# Patient Record
Sex: FEMALE | Race: WHITE | NOT HISPANIC OR LATINO | Employment: OTHER | URBAN - NONMETROPOLITAN AREA
[De-identification: names, ages, dates, MRNs, and addresses within clinical notes are randomized per-mention and may not be internally consistent; named-entity substitution may affect disease eponyms.]

---

## 2017-01-12 ENCOUNTER — OFFICE VISIT (OUTPATIENT)
Dept: FAMILY MEDICINE CLINIC | Facility: CLINIC | Age: 77
End: 2017-01-12

## 2017-01-12 VITALS
WEIGHT: 186 LBS | BODY MASS INDEX: 29.19 KG/M2 | TEMPERATURE: 98.1 F | DIASTOLIC BLOOD PRESSURE: 60 MMHG | OXYGEN SATURATION: 95 % | HEART RATE: 73 BPM | HEIGHT: 67 IN | SYSTOLIC BLOOD PRESSURE: 150 MMHG

## 2017-01-12 DIAGNOSIS — J30.9 CHRONIC ALLERGIC RHINITIS: ICD-10-CM

## 2017-01-12 DIAGNOSIS — M85.80 OSTEOPENIA: ICD-10-CM

## 2017-01-12 DIAGNOSIS — I10 ESSENTIAL HYPERTENSION: ICD-10-CM

## 2017-01-12 DIAGNOSIS — J40 BRONCHITIS: Primary | ICD-10-CM

## 2017-01-12 PROCEDURE — 99214 OFFICE O/P EST MOD 30 MIN: CPT | Performed by: PHYSICIAN ASSISTANT

## 2017-01-12 RX ORDER — BENZONATATE 200 MG/1
200 CAPSULE ORAL 3 TIMES DAILY PRN
Qty: 90 CAPSULE | Refills: 0 | Status: SHIPPED | OUTPATIENT
Start: 2017-01-12 | End: 2017-03-27

## 2017-01-12 RX ORDER — ALBUTEROL SULFATE 90 UG/1
2 AEROSOL, METERED RESPIRATORY (INHALATION) EVERY 4 HOURS PRN
Qty: 1 INHALER | Refills: 5 | Status: SHIPPED | OUTPATIENT
Start: 2017-01-12 | End: 2017-03-27

## 2017-01-12 RX ORDER — AMOXICILLIN AND CLAVULANATE POTASSIUM 875; 125 MG/1; MG/1
1 TABLET, FILM COATED ORAL 2 TIMES DAILY
Qty: 20 TABLET | Refills: 0 | Status: SHIPPED | OUTPATIENT
Start: 2017-01-12 | End: 2017-03-27

## 2017-01-12 NOTE — MR AVS SNAPSHOT
Rylee Hull   1/12/2017 10:40 AM   Office Visit    Dept Phone:  [phone]   Encounter #:  [id number]    Provider:  BRANDAN Benitez   Department:  [facility]                Your Full Care Plan              Today's Medication Changes          These changes are accurate as of: 1/12/17 11:39 AM.  If you have any questions, ask your nurse or doctor.               New Medication(s)Ordered:     albuterol 108 (90 BASE) MCG/ACT inhaler   Commonly known as:  PROVENTIL HFA;VENTOLIN HFA   Inhale 2 puffs Every 4 (Four) Hours As Needed for shortness of air.   Started by:  BRANDAN Benitez       amoxicillin-clavulanate 875-125 MG per tablet   Commonly known as:  AUGMENTIN   Take 1 tablet by mouth 2 (Two) Times a Day.   Started by:  BRANDAN Benitez       benzonatate 200 MG capsule   Commonly known as:  TESSALON   Take 1 capsule by mouth 3 (Three) Times a Day As Needed for cough.   Started by:  BRANDAN Benitez            Where to Get Your Medications      These medications were sent to [facility] - [address] [phone]  - [phone]   [address] [facility] [address]     Phone:  [phone]     albuterol 108 (90 BASE) MCG/ACT inhaler    amoxicillin-clavulanate 875-125 MG per tablet    benzonatate 200 MG capsule                  Your Updated Medication List          This list is accurate as of: 1/12/17 11:39 AM.  Always use your most recent med list.                albuterol 108 (90 BASE) MCG/ACT inhaler   Commonly known as:  PROVENTIL HFA;VENTOLIN HFA   Inhale 2 puffs Every 4 (Four) Hours As Needed for shortness of air.       alendronate 70 MG tablet   Commonly known as:  FOSAMAX   Take 1 tablet by mouth every 7 days.       amoxicillin-clavulanate 875-125 MG per tablet   Commonly known as:  AUGMENTIN   Take 1 tablet by mouth 2 (Two) Times a Day.       benzonatate 200 MG capsule   Commonly known as:   "TESSALON   Take 1 capsule by mouth 3 (Three) Times a Day As Needed for cough.       bisoprolol 10 MG tablet   Commonly known as:  ZEBeta   Take 1 tablet by mouth every night.       cetirizine 10 MG tablet   Commonly known as:  zyrTEC   Take 1 tablet by mouth daily.       triamterene-hydrochlorothiazide 37.5-25 MG per tablet   Commonly known as:  MAXZIDE-25   Take 1 tablet by mouth every morning.               You Were Diagnosed With        Codes Comments    Bronchitis    -  Primary ICD-10-CM: J40  ICD-9-CM: 490     Essential hypertension     ICD-10-CM: I10  ICD-9-CM: 401.9     Chronic allergic rhinitis     ICD-10-CM: J30.9  ICD-9-CM: 477.9     Osteopenia     ICD-10-CM: M85.80  ICD-9-CM: 733.90       Instructions     None    Patient Instructions History      Upcoming Appointments     Visit Type Date Time Department    OFFICE VISIT 1/12/2017 10:40 AM [facility]    OFFICE VISIT 3/27/2017 10:00 AM [facility]      [employer] Signup     Our records indicate that you have declined [employer] signup. If you would like to sign up for [employer], please email [email or web address] or call [phone] to obtain an activation code.             Other Info from Your Visit           Your Appointments     Mar 27, 2017 10:00 AM EDT   Office Visit with Jorge Boss MD   [facility] (--)    [address] [place] [id number] [phone]           Please arrive 10 minutes early. Bring a complete list of all medications and bring any previous records or diagnostic testing results.              Allergies     Barbiturates  Rash      Vital Signs     Blood Pressure Pulse Temperature Height Weight Oxygen Saturation    150/60 (BP Location: Right arm, Patient Position: Sitting, Cuff Size: Adult) 73 98.1 °F (36.7 °C) (Oral) 66.75\" (169.5 cm) 186 lb (84.4 kg) 95%    Body Mass Index Smoking Status                29.35 kg/m2 Never Smoker          Problems and " Diagnoses Noted     Chronic allergic rhinitis    High blood pressure    Bone disease    Bronchitis    -  Primary

## 2017-01-12 NOTE — PROGRESS NOTES
Subjective   Rylee Hull is a 76 y.o. female.     History of Present Illness     Acute illness-  She is here today with complaints of cough productive of thick yellow sputum, sore throat, headache, wheezing and low-grade fever for the past 5 days.  Some relief with Robitussin.    Hypertension-not at goal.  May be elevated due to acute illness    Chronic allergic rhinitis-not at goal due to acute illness    Osteopenia-stable with Lasix, vitamin D and calcium    The following portions of the patient's history were reviewed and updated as appropriate: allergies, current medications, past family history, past medical history, past social history, past surgical history and problem list.    Review of Systems   Constitutional: Positive for chills, fatigue and fever. Negative for activity change and appetite change.   HENT: Positive for congestion and sore throat. Negative for ear pain and sinus pressure.    Eyes: Negative for pain and visual disturbance.   Respiratory: Positive for cough and wheezing. Negative for chest tightness.    Cardiovascular: Negative for chest pain and palpitations.   Gastrointestinal: Negative for abdominal pain, constipation, diarrhea, nausea and vomiting.   Endocrine: Negative for polydipsia and polyuria.   Genitourinary: Negative for dysuria and frequency.   Musculoskeletal: Negative for back pain and myalgias.   Skin: Negative for color change and rash.   Allergic/Immunologic: Negative for food allergies and immunocompromised state.   Neurological: Positive for headaches. Negative for dizziness and syncope.   Hematological: Negative for adenopathy. Does not bruise/bleed easily.   Psychiatric/Behavioral: Negative for hallucinations and suicidal ideas. The patient is not nervous/anxious.        Objective   Physical Exam   Constitutional: She is oriented to person, place, and time. She appears well-developed and well-nourished.   HENT:   Head: Normocephalic and atraumatic.   Nose: Nose  normal.   Mouth/Throat: Oropharynx is clear and moist.   Eyes: Conjunctivae and EOM are normal. Pupils are equal, round, and reactive to light.   Neck: Normal range of motion. Neck supple. No tracheal deviation present. No thyromegaly present.   Cardiovascular: Normal rate, regular rhythm, normal heart sounds and intact distal pulses.    No murmur heard.  Pulmonary/Chest: Effort normal. No respiratory distress. She has wheezes.   Coarse breath sounds noted bilaterally   Abdominal: Soft. Bowel sounds are normal. There is no tenderness. There is no guarding.   Musculoskeletal: Normal range of motion. She exhibits no edema or tenderness.   Lymphadenopathy:     She has no cervical adenopathy.   Neurological: She is alert and oriented to person, place, and time.   Skin: Skin is warm and dry. No rash noted.   Psychiatric: She has a normal mood and affect. Her behavior is normal.   Nursing note and vitals reviewed.      Assessment/Plan   Diagnoses and all orders for this visit:    Bronchitis  -     amoxicillin-clavulanate (AUGMENTIN) 875-125 MG per tablet; Take 1 tablet by mouth 2 (Two) Times a Day.  -     albuterol (PROVENTIL HFA;VENTOLIN HFA) 108 (90 BASE) MCG/ACT inhaler; Inhale 2 puffs Every 4 (Four) Hours As Needed for shortness of air.  -     benzonatate (TESSALON) 200 MG capsule; Take 1 capsule by mouth 3 (Three) Times a Day As Needed for cough.    Essential hypertension    Chronic allergic rhinitis    Osteopenia

## 2017-03-27 ENCOUNTER — OFFICE VISIT (OUTPATIENT)
Dept: FAMILY MEDICINE CLINIC | Facility: CLINIC | Age: 77
End: 2017-03-27

## 2017-03-27 VITALS
WEIGHT: 186 LBS | TEMPERATURE: 98.6 F | DIASTOLIC BLOOD PRESSURE: 80 MMHG | HEIGHT: 67 IN | SYSTOLIC BLOOD PRESSURE: 130 MMHG | OXYGEN SATURATION: 98 % | BODY MASS INDEX: 29.19 KG/M2 | HEART RATE: 68 BPM | RESPIRATION RATE: 12 BRPM

## 2017-03-27 DIAGNOSIS — Z00.00 HEALTHCARE MAINTENANCE: ICD-10-CM

## 2017-03-27 DIAGNOSIS — M81.0 AGE-RELATED OSTEOPOROSIS WITHOUT CURRENT PATHOLOGICAL FRACTURE: ICD-10-CM

## 2017-03-27 DIAGNOSIS — I10 ESSENTIAL HYPERTENSION: ICD-10-CM

## 2017-03-27 DIAGNOSIS — E78.2 MIXED HYPERLIPIDEMIA: Primary | ICD-10-CM

## 2017-03-27 DIAGNOSIS — M85.80 OSTEOPENIA: ICD-10-CM

## 2017-03-27 DIAGNOSIS — J30.9 CHRONIC ALLERGIC RHINITIS: ICD-10-CM

## 2017-03-27 DIAGNOSIS — E56.9 VITAMIN DEFICIENCY: ICD-10-CM

## 2017-03-27 PROCEDURE — 99213 OFFICE O/P EST LOW 20 MIN: CPT | Performed by: GENERAL PRACTICE

## 2017-03-27 RX ORDER — TRIAMTERENE AND HYDROCHLOROTHIAZIDE 37.5; 25 MG/1; MG/1
1 TABLET ORAL EVERY MORNING
Qty: 90 TABLET | Refills: 3 | Status: SHIPPED | OUTPATIENT
Start: 2017-03-27 | End: 2018-03-26 | Stop reason: SDUPTHER

## 2017-03-27 RX ORDER — FLUTICASONE PROPIONATE 50 MCG
SPRAY, SUSPENSION (ML) NASAL
Qty: 3 BOTTLE | Refills: 3 | Status: SHIPPED | OUTPATIENT
Start: 2017-03-27 | End: 2018-03-26 | Stop reason: SDUPTHER

## 2017-03-27 RX ORDER — ALENDRONATE SODIUM 70 MG/1
70 TABLET ORAL
Qty: 12 TABLET | Refills: 3 | Status: SHIPPED | OUTPATIENT
Start: 2017-03-27 | End: 2018-03-26 | Stop reason: SDUPTHER

## 2017-03-27 RX ORDER — CETIRIZINE HYDROCHLORIDE 10 MG/1
10 TABLET ORAL DAILY
Qty: 90 TABLET | Refills: 3 | Status: SHIPPED | OUTPATIENT
Start: 2017-03-27 | End: 2018-03-26 | Stop reason: SDUPTHER

## 2017-03-27 RX ORDER — BISOPROLOL FUMARATE 10 MG/1
10 TABLET, FILM COATED ORAL NIGHTLY
Qty: 90 TABLET | Refills: 3 | Status: SHIPPED | OUTPATIENT
Start: 2017-03-27 | End: 2018-03-26 | Stop reason: SDUPTHER

## 2017-03-27 NOTE — PROGRESS NOTES
Subjective   Rylee Hull is a 77 y.o. female.     History of Present Illness     Allergic Rhinitis  Patient has a history of allergic rhinitis. Patient's symptoms include sneezing, clear rhinorrhea and nasal congestion. These symptoms are perennial with seasonal exacerbation.  She has been worse since the onset of spring. Current triggers include exposure to pollens, weeds and dust. The patient has been suffering from these symptoms for a number of years . The patient has tried OTC antihistamine - cetirizine and OTC nasal corticosteroid - fluticasone with good relief of symptoms.  She is not been using the latter at present.  Immunotherapy has never been tried. The patient has no history of asthma. The patient has no history of eczema. The patient does not suffer from frequent sinopulmonary infections. The patient has not had sinus surgery in the past.     Hypertension  Home blood pressure readings: not doing. Associated signs and symptoms: none. Patient denies: chest pain, palpitations, dyspnea, orthopnea, paroxysmal nocturnal dyspnea and peripheral edema. Current antihypertensive medications includes bisoprolol and triamterene/hydrochlorthiazine. Medication compliance: taking as prescribed.     Dyslipidemia  Compliance with treatment has been good. The patient exercises frequently. She is currently being prescribed the following medication for her dyslipidemia - lifestyle modifcation.  She has had no recent labs    The following portions of the patient's history were reviewed and updated as appropriate: allergies, current medications, past medical history, past social history and problem list.    Review of Systems   Constitutional: Negative for appetite change, chills, fatigue, fever and unexpected weight change.   HENT: Positive for congestion, rhinorrhea and sneezing. Negative for ear pain, sore throat and voice change.    Eyes: Negative for visual disturbance.   Respiratory: Negative for cough, shortness  of breath and wheezing.    Cardiovascular: Negative for chest pain, palpitations and leg swelling.   Gastrointestinal: Negative for abdominal pain, blood in stool, constipation, diarrhea, nausea and vomiting.   Endocrine: Negative for polydipsia.   Genitourinary: Negative for difficulty urinating, dysuria, frequency, hematuria, menstrual problem, pelvic pain, urgency, vaginal bleeding and vaginal discharge.   Musculoskeletal: Negative for arthralgias, back pain, joint swelling, myalgias and neck pain.   Skin: Negative for color change.   Neurological: Negative for tremors, weakness, numbness and headaches.   Psychiatric/Behavioral: Negative for dysphoric mood, sleep disturbance and suicidal ideas. The patient is not nervous/anxious.      Objective   Physical Exam   Constitutional: She is oriented to person, place, and time. No distress.   Appeared younger then stated age. Bright and in good spirits. No apparent distress. No pallor, jaundice, diaphoresis, or cyanosis.     HENT:   Head: Atraumatic.   Right Ear: Tympanic membrane, external ear and ear canal normal.   Left Ear: Tympanic membrane, external ear and ear canal normal.   Nose: Nose normal.   Mouth/Throat: Oropharynx is clear and moist. Mucous membranes are not pale and not cyanotic.   Eyes: EOM are normal. Pupils are equal, round, and reactive to light. No scleral icterus.   Neck: No JVD present. Carotid bruit is not present. No tracheal deviation present. No thyromegaly present.   Cardiovascular: Normal rate, regular rhythm, S1 normal, S2 normal and intact distal pulses.  Exam reveals no gallop, no S3 and no S4.    No murmur heard.  Pulmonary/Chest: Breath sounds normal. No stridor. No respiratory distress.   Abdominal: Soft. Normal aorta and bowel sounds are normal. She exhibits no distension, no abdominal bruit and no mass. There is no hepatosplenomegaly. There is no tenderness. No hernia.   Musculoskeletal: She exhibits no tenderness or deformity.        Vascular Status -  Her exam exhibits no right foot edema. Her exam exhibits no left foot edema.  Lymphadenopathy:        Head (right side): No submandibular adenopathy present.        Head (left side): No submandibular adenopathy present.     She has no cervical adenopathy.   Neurological: She is alert and oriented to person, place, and time. She has normal reflexes. She displays normal reflexes. No cranial nerve deficit. She exhibits normal muscle tone. Coordination normal.   Skin: Skin is warm and dry. No rash noted. She is not diaphoretic. No cyanosis. No pallor. Nails show no clubbing.   Psychiatric: She has a normal mood and affect.     Assessment/Plan   Problems Addressed this Visit        Cardiovascular and Mediastinum    Mixed hyperlipidemia   Encouraged to continue to work on his diet and exercise plan.  Fasting lab work will be updated just prior to her return in 6 months     Relevant Orders    Lipid Panel    TSH    Essential hypertension  Hypertension: at goal. Evidence of target organ damage: none.  Continue current medication    Relevant Medications    triamterene-hydrochlorothiazide (MAXZIDE-25) 37.5-25 MG per tablet    bisoprolol (ZEBeta) 10 MG tablet    Other Relevant Orders    CBC & Differential    Comprehensive Metabolic Panel       Respiratory    Chronic allergic rhinitis  Reminded regarding allergen avoidance. We'll resume nasal corticosteroid    Relevant Medications    cetirizine (zyrTEC) 10 MG tablet    fluticasone (FLONASE) 50 MCG/ACT nasal spray       Digestive    Vitamin deficiency    Relevant Orders    Vitamin D 25 Hydroxy       Musculoskeletal and Integument    Osteopenia  Encouraged to pursue weightbearing activities while exercising joint protection     Relevant Medications    alendronate (FOSAMAX) 70 MG tablet       Other    Healthcare maintenance  Reminded to follow-up with her mammogram

## 2017-09-19 ENCOUNTER — LAB (OUTPATIENT)
Dept: FAMILY MEDICINE CLINIC | Facility: CLINIC | Age: 77
End: 2017-09-19

## 2017-09-19 DIAGNOSIS — I10 ESSENTIAL HYPERTENSION: ICD-10-CM

## 2017-09-19 DIAGNOSIS — M81.0 AGE-RELATED OSTEOPOROSIS WITHOUT CURRENT PATHOLOGICAL FRACTURE: ICD-10-CM

## 2017-09-19 DIAGNOSIS — E56.9 VITAMIN DEFICIENCY: ICD-10-CM

## 2017-09-19 DIAGNOSIS — E78.2 MIXED HYPERLIPIDEMIA: ICD-10-CM

## 2017-09-19 LAB
25(OH)D3 SERPL-MCNC: 38 NG/ML
ALBUMIN SERPL-MCNC: 4.5 G/DL (ref 3.4–4.8)
ALBUMIN/GLOB SERPL: 1.6 G/DL (ref 1.5–2.5)
ALP SERPL-CCNC: 88 U/L (ref 35–104)
ALT SERPL W P-5'-P-CCNC: 24 U/L (ref 10–36)
ANION GAP SERPL CALCULATED.3IONS-SCNC: 4.5 MMOL/L (ref 3.6–11.2)
AST SERPL-CCNC: 26 U/L (ref 10–30)
BASOPHILS # BLD AUTO: 0.03 10*3/MM3 (ref 0–0.3)
BASOPHILS NFR BLD AUTO: 0.5 % (ref 0–2)
BILIRUB SERPL-MCNC: 0.5 MG/DL (ref 0.2–1.8)
BUN BLD-MCNC: 16 MG/DL (ref 7–21)
BUN/CREAT SERPL: 21.6 (ref 7–25)
CALCIUM SPEC-SCNC: 9.7 MG/DL (ref 7.7–10)
CHLORIDE SERPL-SCNC: 106 MMOL/L (ref 99–112)
CHOLEST SERPL-MCNC: 215 MG/DL (ref 0–200)
CO2 SERPL-SCNC: 33.5 MMOL/L (ref 24.3–31.9)
CREAT BLD-MCNC: 0.74 MG/DL (ref 0.43–1.29)
DEPRECATED RDW RBC AUTO: 43.2 FL (ref 37–54)
EOSINOPHIL # BLD AUTO: 0.23 10*3/MM3 (ref 0–0.7)
EOSINOPHIL NFR BLD AUTO: 3.8 % (ref 0–7)
ERYTHROCYTE [DISTWIDTH] IN BLOOD BY AUTOMATED COUNT: 13.2 % (ref 11.5–14.5)
GFR SERPL CREATININE-BSD FRML MDRD: 76 ML/MIN/1.73
GLOBULIN UR ELPH-MCNC: 2.8 GM/DL
GLUCOSE BLD-MCNC: 93 MG/DL (ref 70–110)
HCT VFR BLD AUTO: 39.8 % (ref 37–47)
HDLC SERPL-MCNC: 82 MG/DL (ref 60–100)
HGB BLD-MCNC: 13.4 G/DL (ref 12–16)
IMM GRANULOCYTES # BLD: 0.01 10*3/MM3 (ref 0–0.03)
IMM GRANULOCYTES NFR BLD: 0.2 % (ref 0–0.5)
LDLC SERPL CALC-MCNC: 112 MG/DL (ref 0–100)
LDLC/HDLC SERPL: 1.37 {RATIO}
LYMPHOCYTES # BLD AUTO: 2.1 10*3/MM3 (ref 1–3)
LYMPHOCYTES NFR BLD AUTO: 34.9 % (ref 16–46)
MCH RBC QN AUTO: 30.1 PG (ref 27–33)
MCHC RBC AUTO-ENTMCNC: 33.7 G/DL (ref 33–37)
MCV RBC AUTO: 89.4 FL (ref 80–94)
MONOCYTES # BLD AUTO: 0.56 10*3/MM3 (ref 0.1–0.9)
MONOCYTES NFR BLD AUTO: 9.3 % (ref 0–12)
NEUTROPHILS # BLD AUTO: 3.09 10*3/MM3 (ref 1.4–6.5)
NEUTROPHILS NFR BLD AUTO: 51.3 % (ref 40–75)
OSMOLALITY SERPL CALC.SUM OF ELEC: 287.7 MOSM/KG (ref 273–305)
PLATELET # BLD AUTO: 296 10*3/MM3 (ref 130–400)
PMV BLD AUTO: 9.4 FL (ref 6–10)
POTASSIUM BLD-SCNC: 3.9 MMOL/L (ref 3.5–5.3)
PROT SERPL-MCNC: 7.3 G/DL (ref 6–8)
RBC # BLD AUTO: 4.45 10*6/MM3 (ref 4.2–5.4)
SODIUM BLD-SCNC: 144 MMOL/L (ref 135–153)
TRIGL SERPL-MCNC: 104 MG/DL (ref 0–150)
TSH SERPL DL<=0.05 MIU/L-ACNC: 1.05 MIU/ML (ref 0.55–4.78)
VLDLC SERPL-MCNC: 20.8 MG/DL
WBC NRBC COR # BLD: 6.02 10*3/MM3 (ref 4.5–12.5)

## 2017-09-19 PROCEDURE — 80061 LIPID PANEL: CPT | Performed by: GENERAL PRACTICE

## 2017-09-19 PROCEDURE — 36415 COLL VENOUS BLD VENIPUNCTURE: CPT

## 2017-09-19 PROCEDURE — 82306 VITAMIN D 25 HYDROXY: CPT | Performed by: GENERAL PRACTICE

## 2017-09-19 PROCEDURE — 80053 COMPREHEN METABOLIC PANEL: CPT | Performed by: GENERAL PRACTICE

## 2017-09-19 PROCEDURE — 84443 ASSAY THYROID STIM HORMONE: CPT | Performed by: GENERAL PRACTICE

## 2017-09-19 PROCEDURE — 85025 COMPLETE CBC W/AUTO DIFF WBC: CPT | Performed by: GENERAL PRACTICE

## 2017-09-26 ENCOUNTER — OFFICE VISIT (OUTPATIENT)
Dept: FAMILY MEDICINE CLINIC | Facility: CLINIC | Age: 77
End: 2017-09-26

## 2017-09-26 DIAGNOSIS — L98.9 SKIN LESION: ICD-10-CM

## 2017-09-26 DIAGNOSIS — Z23 ENCOUNTER FOR IMMUNIZATION: ICD-10-CM

## 2017-09-26 DIAGNOSIS — Z00.00 HEALTHCARE MAINTENANCE: ICD-10-CM

## 2017-09-26 DIAGNOSIS — M85.80 OSTEOPENIA: ICD-10-CM

## 2017-09-26 DIAGNOSIS — I10 ESSENTIAL HYPERTENSION: ICD-10-CM

## 2017-09-26 DIAGNOSIS — J30.9 CHRONIC ALLERGIC RHINITIS: ICD-10-CM

## 2017-09-26 DIAGNOSIS — E56.9 VITAMIN DEFICIENCY: ICD-10-CM

## 2017-09-26 DIAGNOSIS — E78.2 MIXED HYPERLIPIDEMIA: Primary | ICD-10-CM

## 2017-09-26 PROCEDURE — 99214 OFFICE O/P EST MOD 30 MIN: CPT | Performed by: GENERAL PRACTICE

## 2017-09-26 PROCEDURE — G0008 ADMIN INFLUENZA VIRUS VAC: HCPCS | Performed by: GENERAL PRACTICE

## 2017-09-26 PROCEDURE — 90686 IIV4 VACC NO PRSV 0.5 ML IM: CPT | Performed by: GENERAL PRACTICE

## 2017-09-26 NOTE — PROGRESS NOTES
Subjective   Rylee Hull is a 77 y.o. female.     History of Present Illness     Allergic Rhinitis  Patient has a history of allergic rhinitis. Patient's symptoms include sneezing, clear rhinorrhea and nasal congestion. These symptoms are perennial with seasonal exacerbation. Current triggers include exposure to pollens, weeds and dust. The patient has been suffering from these symptoms for a number of years . The patient has tried OTC antihistamine - cetirizine and OTC nasal corticosteroid - fluticasone with good relief of symptoms. Immunotherapy has never been tried. The patient has no history of asthma. The patient has no history of eczema. The patient does not suffer from frequent sinopulmonary infections. The patient has not had sinus surgery in the past.     Hypertension  Home blood pressure readings: not doing. Associated signs and symptoms: none. Patient denies: chest pain, palpitations, dyspnea, orthopnea, paroxysmal nocturnal dyspnea and peripheral edema. Current antihypertensive medications includes bisoprolol and triamterene/hydrochlorthiazine. Medication compliance: taking as prescribed. Most recent creatinine   Lab Results   Component Value Date    CREATININE 0.74 09/19/2017     Dyslipidemia  Compliance with treatment has been excellent. The patient exercises daily. She is currently being prescribed the following medication for her dyslipidemia - lifestyle modifcation. Most recent lipids include  Lab Results   Component Value Date    TRIG 104 09/19/2017    TRIG 105 09/07/2016    HDL 82 09/19/2017    HDL 88 09/07/2016    LDLCALC 112 (H) 09/19/2017    LDLCALC 111 (H) 09/07/2016     (H) 09/01/2015     (H) 09/18/2014     Labs  Most recent vitamin D 38    The following portions of the patient's history were reviewed and updated as appropriate: allergies, current medications, past family history, past medical history, past social history and problem list.    Review of Systems    Constitutional: Negative for appetite change, chills, fatigue, fever and unexpected weight change.   HENT: Positive for congestion, rhinorrhea and sneezing. Negative for ear pain, sore throat and voice change.    Eyes: Negative for visual disturbance.   Respiratory: Negative for cough, shortness of breath and wheezing.    Cardiovascular: Negative for chest pain, palpitations and leg swelling.   Gastrointestinal: Negative for abdominal pain, blood in stool, constipation, diarrhea, nausea and vomiting.   Endocrine: Negative for polydipsia.   Genitourinary: Negative for difficulty urinating, dysuria, frequency, hematuria, menstrual problem, pelvic pain, urgency, vaginal bleeding and vaginal discharge.   Musculoskeletal: Negative for arthralgias, back pain, joint swelling, myalgias and neck pain.   Skin: Negative for color change.   Neurological: Negative for tremors, weakness, numbness and headaches.   Psychiatric/Behavioral: Negative for dysphoric mood, sleep disturbance and suicidal ideas. The patient is not nervous/anxious.      Objective   Physical Exam   Constitutional: She is oriented to person, place, and time. No distress.   Appeared younger then stated age. Bright and in good spirits. No apparent distress. No pallor, jaundice, diaphoresis, or cyanosis.     HENT:   Head: Atraumatic.   Right Ear: Tympanic membrane, external ear and ear canal normal.   Left Ear: Tympanic membrane, external ear and ear canal normal.   Nose: Nose normal.   Mouth/Throat: Oropharynx is clear and moist. Mucous membranes are not pale and not cyanotic.   Eyes: EOM are normal. Pupils are equal, round, and reactive to light. No scleral icterus.   Neck: No JVD present. Carotid bruit is not present. No tracheal deviation present. No thyromegaly present.   Cardiovascular: Normal rate, regular rhythm, S1 normal, S2 normal and intact distal pulses.  Exam reveals no gallop, no S3 and no S4.    No murmur heard.  Pulmonary/Chest: Breath sounds  normal. No stridor. No respiratory distress.   Abdominal: Soft. Normal aorta and bowel sounds are normal. She exhibits no distension, no abdominal bruit and no mass. There is no hepatosplenomegaly. There is no tenderness. No hernia.   Musculoskeletal: She exhibits no tenderness or deformity.       Vascular Status -  Her exam exhibits no right foot edema. Her exam exhibits no left foot edema.  Lymphadenopathy:        Head (right side): No submandibular adenopathy present.        Head (left side): No submandibular adenopathy present.     She has no cervical adenopathy.   Neurological: She is alert and oriented to person, place, and time. She has normal reflexes. She displays normal reflexes. No cranial nerve deficit. She exhibits normal muscle tone. Coordination normal.   Skin: Skin is warm and dry. Lesion (1 cm slightly irregular well demarcated slightly scaly erythematous macule along the right medial jawline) noted. No rash noted. She is not diaphoretic. No cyanosis. No pallor. Nails show no clubbing.   Psychiatric: She has a normal mood and affect.     Assessment/Plan   Problems Addressed this Visit        Cardiovascular and Mediastinum    Mixed hyperlipidemia   Encouraged to continue to work on her diet and exercise plan.    Essential hypertension  Hypertension: at goal. Evidence of target organ damage: none.  Continue current medication       Respiratory    Chronic allergic rhinitis  Continue current treatment. Reminded regarding allergen avoidance.       Digestive    Vitamin deficiency  Corrected.    Continue low-dose supplementation  Will continue to monitor       Musculoskeletal and Integument    Osteopenia  We'll discuss an updated DEXA scan at her return    Skin lesion  Right medial jawline.  Reviewed options going forward.  Patient like to monitor and will report if any changes prior to her return       Other    Healthcare maintenance  Recommended a flu shot  Patient has already arrange for an updated  mammogram   We'll discuss an updated Tdap at her return     Encounter for immunization    Relevant Orders    Flu Vaccine Quad PF 3YR+ (Completed)

## 2017-09-27 VITALS
WEIGHT: 190 LBS | HEIGHT: 67 IN | SYSTOLIC BLOOD PRESSURE: 120 MMHG | RESPIRATION RATE: 12 BRPM | DIASTOLIC BLOOD PRESSURE: 70 MMHG | TEMPERATURE: 97.7 F | HEART RATE: 62 BPM | OXYGEN SATURATION: 97 % | BODY MASS INDEX: 29.82 KG/M2

## 2017-09-27 PROBLEM — L98.9 SKIN LESION: Status: ACTIVE | Noted: 2017-09-27

## 2017-12-15 ENCOUNTER — OFFICE VISIT (OUTPATIENT)
Dept: RETAIL CLINIC | Facility: CLINIC | Age: 77
End: 2017-12-15

## 2017-12-15 VITALS
OXYGEN SATURATION: 97 % | RESPIRATION RATE: 18 BRPM | WEIGHT: 188.6 LBS | TEMPERATURE: 97.8 F | BODY MASS INDEX: 29.76 KG/M2 | HEART RATE: 69 BPM

## 2017-12-15 DIAGNOSIS — R05.9 COUGH: Primary | ICD-10-CM

## 2017-12-15 DIAGNOSIS — J10.1 INFLUENZA A: ICD-10-CM

## 2017-12-15 LAB
EXPIRATION DATE: ABNORMAL
FLUAV AG NPH QL: POSITIVE
FLUBV AG NPH QL: NEGATIVE
INTERNAL CONTROL: ABNORMAL
Lab: ABNORMAL

## 2017-12-15 PROCEDURE — 99213 OFFICE O/P EST LOW 20 MIN: CPT | Performed by: NURSE PRACTITIONER

## 2017-12-15 PROCEDURE — 87804 INFLUENZA ASSAY W/OPTIC: CPT | Performed by: NURSE PRACTITIONER

## 2017-12-15 RX ORDER — OSELTAMIVIR PHOSPHATE 75 MG/1
75 CAPSULE ORAL 2 TIMES DAILY
Qty: 10 CAPSULE | Refills: 0 | Status: SHIPPED | OUTPATIENT
Start: 2017-12-15 | End: 2018-03-22

## 2017-12-15 RX ORDER — AZITHROMYCIN 250 MG/1
TABLET, FILM COATED ORAL
Qty: 6 TABLET | Refills: 0 | Status: SHIPPED | OUTPATIENT
Start: 2017-12-15 | End: 2018-03-22

## 2017-12-15 RX ORDER — GUAIFENESIN 600 MG/1
600 TABLET, EXTENDED RELEASE ORAL 2 TIMES DAILY
Qty: 14 TABLET | Refills: 0 | Status: SHIPPED | OUTPATIENT
Start: 2017-12-15 | End: 2018-03-22

## 2017-12-15 NOTE — PROGRESS NOTES
Subjective     Rylee Hull is a 77 y.o. female.     Chief Complaint   Patient presents with   • Cough      Cough   This is a new problem. Episode onset: 2 days. The problem has been gradually worsening. The cough is productive of brown sputum. Associated symptoms include chills, ear congestion, a fever, headaches, myalgias, nasal congestion, postnasal drip and rhinorrhea. Pertinent negatives include no chest pain, ear pain, rash, sore throat or wheezing. Nothing aggravates the symptoms. The treatment provided no relief.      The following portions of the patient's history were reviewed and updated as appropriate: allergies, current medications, past family history, past medical history, past social history, past surgical history and problem list.    Current Outpatient Prescriptions:   •  alendronate (FOSAMAX) 70 MG tablet, Take 1 tablet by mouth Every 7 (Seven) Days., Disp: 12 tablet, Rfl: 3  •  bisoprolol (ZEBeta) 10 MG tablet, Take 1 tablet by mouth Every Night., Disp: 90 tablet, Rfl: 3  •  cetirizine (zyrTEC) 10 MG tablet, Take 1 tablet by mouth Daily., Disp: 90 tablet, Rfl: 3  •  fluticasone (FLONASE) 50 MCG/ACT nasal spray, Administer 2 sprays both nostrils once daily, Disp: 3 bottle, Rfl: 3  •  triamterene-hydrochlorothiazide (MAXZIDE-25) 37.5-25 MG per tablet, Take 1 tablet by mouth Every Morning., Disp: 90 tablet, Rfl: 3  •  azithromycin (ZITHROMAX Z-CONSUELO) 250 MG tablet, Take 2 tablets the first day, then 1 tablet daily for 4 days., Disp: 6 tablet, Rfl: 0  •  guaiFENesin (MUCINEX) 600 MG 12 hr tablet, Take 1 tablet by mouth 2 (Two) Times a Day., Disp: 14 tablet, Rfl: 0  •  oseltamivir (TAMIFLU) 75 MG capsule, Take 1 capsule by mouth 2 (Two) Times a Day., Disp: 10 capsule, Rfl: 0    Pulse 69  Temp 97.8 °F (36.6 °C) (Oral)   Resp 18  Wt 85.5 kg (188 lb 9.6 oz)  SpO2 97%  BMI 29.76 kg/m2    Review of Systems   Constitutional: Positive for chills and fever.   HENT: Positive for postnasal drip and  rhinorrhea. Negative for ear pain and sore throat.    Respiratory: Positive for cough. Negative for wheezing.    Cardiovascular: Negative for chest pain.   Gastrointestinal: Negative for nausea and vomiting.   Musculoskeletal: Positive for myalgias. Negative for neck stiffness.   Skin: Negative for color change and rash.   Neurological: Positive for headaches.   Psychiatric/Behavioral: Positive for sleep disturbance.     Allergies   Allergen Reactions   • Barbiturates Rash     Physical Exam   Constitutional: She appears well-developed.  Non-toxic appearance. She has a sickly appearance.   HENT:   Head: Normocephalic.   Right Ear: Tympanic membrane normal.   Left Ear: Tympanic membrane normal.   Nose: Mucosal edema and rhinorrhea present.   Mouth/Throat: Uvula is midline. Posterior oropharyngeal erythema present. No oropharyngeal exudate. No tonsillar exudate.   Eyes: Conjunctivae are normal.   Cardiovascular: Normal rate and regular rhythm.    Pulmonary/Chest: Effort normal and breath sounds normal. No respiratory distress. She has no wheezes.   Lymphadenopathy:     She has cervical adenopathy.   Neurological: She is alert.   Skin: Skin is warm and dry.   Psychiatric: She has a normal mood and affect.      Assessment/Plan     Rylee was seen today for cough.    Diagnoses and all orders for this visit:    Cough  -     POC Influenza A / B  -     azithromycin (ZITHROMAX Z-CONSUELO) 250 MG tablet; Take 2 tablets the first day, then 1 tablet daily for 4 days.  -     guaiFENesin (MUCINEX) 600 MG 12 hr tablet; Take 1 tablet by mouth 2 (Two) Times a Day.    Influenza A  -     oseltamivir (TAMIFLU) 75 MG capsule; Take 1 capsule by mouth 2 (Two) Times a Day.      Results for orders placed or performed in visit on 12/15/17   POC Influenza A / B   Result Value Ref Range    Rapid Influenza A Ag POSITIVE     Rapid Influenza B Ag NEGATIVE     Internal Control Passed Passed    Lot Number [id number]     Expiration Date 4/2019          Discussed results to the POC flu screen. Reviewed measures to promote hydration and control fever and infection control.  Follow up with PCP or at the Urgent Care if symptoms worsen or fail to improve.  Patient teaching information discussed and provided to the patient. Patient verbalized understanding.      December 15, 2017 11:49 AM

## 2017-12-15 NOTE — PATIENT INSTRUCTIONS
"Influenza, Adult  Influenza (\"the flu\") is an infection in the lungs, nose, and throat (respiratory tract). It is caused by a virus. The flu causes many common cold symptoms, as well as a high fever and body aches. It can make you feel very sick.  The flu spreads easily from person to person (is contagious). Getting a flu shot (influenza vaccination) every year is the best way to prevent the flu.  HOME CARE  · Take over-the-counter and prescription medicines only as told by your doctor.  · Use a cool mist humidifier to add moisture (humidity) to the air in your home. This can make it easier to breathe.  · Rest as needed.  · Drink enough fluid to keep your pee (urine) clear or pale yellow.  · Cover your mouth and nose when you cough or sneeze.  · Wash your hands with soap and water often, especially after you cough or sneeze. If you cannot use soap and water, use hand .  · Stay home from work or school as told by your doctor. Unless you are visiting your doctor, try to avoid leaving home until your fever has been gone for 24 hours without the use of medicine.  · Keep all follow-up visits as told by your doctor. This is important.  PREVENTION  · Getting a yearly (annual) flu shot is the best way to avoid getting the flu. You may get the flu shot in late summer, fall, or winter. Ask your doctor when you should get your flu shot.  · Wash your hands often or use hand  often.  · Avoid contact with people who are sick during cold and flu season.  · Eat healthy foods.  · Drink plenty of fluids.  · Get enough sleep.  · Exercise regularly.  GET HELP IF:  · You get new symptoms.  · You have:    Chest pain.    Watery poop (diarrhea).    A fever.  · Your cough gets worse.  · You start to have more mucus.  · You feel sick to your stomach (nauseous).  · You throw up (vomit).  GET HELP RIGHT AWAY IF:  · You start to be short of breath or have trouble breathing.  · Your skin or nails turn a bluish color.  · You " have very bad pain or stiffness in your neck.  · You get a sudden headache.  · You get sudden pain in your face or ear.  · You cannot stop throwing up.     This information is not intended to replace advice given to you by your health care provider. Make sure you discuss any questions you have with your health care provider.     Document Released: 09/26/2009 Document Revised: 04/10/2017 Document Reviewed: 10/11/2016  [employer] Interactive Patient Education ©2017 [employer]    Cough, Adult  Coughing is a reflex that clears your throat and your airways. Coughing helps to heal and protect your lungs. It is normal to cough occasionally, but a cough that happens with other symptoms or lasts a long time may be a sign of a condition that needs treatment. A cough may last only 2-3 weeks (acute), or it may last longer than 8 weeks (chronic).  CAUSES  Coughing is commonly caused by:  · Breathing in substances that irritate your lungs.  · A viral or bacterial respiratory infection.  · Allergies.  · Asthma.  · Postnasal drip.  · Smoking.  · Acid backing up from the stomach into the esophagus (gastroesophageal reflux).  · Certain medicines.  · Chronic lung problems, including COPD (or rarely, lung cancer).  · Other medical conditions such as heart failure.  HOME CARE INSTRUCTIONS   Pay attention to any changes in your symptoms. Take these actions to help with your discomfort:  · Take medicines only as told by your health care provider.    If you were prescribed an antibiotic medicine, take it as told by your health care provider. Do not stop taking the antibiotic even if you start to feel better.    Talk with your health care provider before you take a cough suppressant medicine.  · Drink enough fluid to keep your urine clear or pale yellow.  · If the air is dry, use a cold steam vaporizer or humidifier in your bedroom or your home to help loosen secretions.  · Avoid anything that causes you to cough at work or at home.  · If  your cough is worse at night, try sleeping in a semi-upright position.  · Avoid cigarette smoke. If you smoke, quit smoking. If you need help quitting, ask your health care provider.  · Avoid caffeine.  · Avoid alcohol.  · Rest as needed.  SEEK MEDICAL CARE IF:   · You have new symptoms.  · You cough up pus.  · Your cough does not get better after 2-3 weeks, or your cough gets worse.  · You cannot control your cough with suppressant medicines and you are losing sleep.  · You develop pain that is getting worse or pain that is not controlled with pain medicines.  · You have a fever.  · You have unexplained weight loss.  · You have night sweats.  SEEK IMMEDIATE MEDICAL CARE IF:  · You cough up blood.  · You have difficulty breathing.  · Your heartbeat is very fast.     This information is not intended to replace advice given to you by your health care provider. Make sure you discuss any questions you have with your health care provider.     Document Released: 06/15/2012 Document Revised: 09/07/2016 Document Reviewed: 02/24/2016  [employer] Interactive Patient Education ©2017 [employer]

## 2018-03-22 ENCOUNTER — OFFICE VISIT (OUTPATIENT)
Dept: FAMILY MEDICINE CLINIC | Facility: CLINIC | Age: 78
End: 2018-03-22

## 2018-03-22 VITALS
DIASTOLIC BLOOD PRESSURE: 78 MMHG | SYSTOLIC BLOOD PRESSURE: 128 MMHG | WEIGHT: 191 LBS | HEIGHT: 67 IN | TEMPERATURE: 98 F | OXYGEN SATURATION: 99 % | BODY MASS INDEX: 29.98 KG/M2 | HEART RATE: 66 BPM

## 2018-03-22 DIAGNOSIS — I10 ESSENTIAL HYPERTENSION: ICD-10-CM

## 2018-03-22 DIAGNOSIS — J30.89 CHRONIC NONSEASONAL ALLERGIC RHINITIS DUE TO POLLEN: ICD-10-CM

## 2018-03-22 DIAGNOSIS — Z00.00 MEDICARE ANNUAL WELLNESS VISIT, SUBSEQUENT: Primary | ICD-10-CM

## 2018-03-22 DIAGNOSIS — M81.0 AGE-RELATED OSTEOPOROSIS WITHOUT CURRENT PATHOLOGICAL FRACTURE: ICD-10-CM

## 2018-03-22 DIAGNOSIS — Z13.820 ENCOUNTER FOR SCREENING FOR OSTEOPOROSIS: ICD-10-CM

## 2018-03-22 DIAGNOSIS — Z12.31 ENCOUNTER FOR SCREENING MAMMOGRAM FOR BREAST CANCER: ICD-10-CM

## 2018-03-22 DIAGNOSIS — Z00.00 HEALTHCARE MAINTENANCE: ICD-10-CM

## 2018-03-22 DIAGNOSIS — E78.2 MIXED HYPERLIPIDEMIA: ICD-10-CM

## 2018-03-22 PROCEDURE — G0009 ADMIN PNEUMOCOCCAL VACCINE: HCPCS | Performed by: PHYSICIAN ASSISTANT

## 2018-03-22 PROCEDURE — G0439 PPPS, SUBSEQ VISIT: HCPCS | Performed by: PHYSICIAN ASSISTANT

## 2018-03-22 PROCEDURE — 90732 PPSV23 VACC 2 YRS+ SUBQ/IM: CPT | Performed by: PHYSICIAN ASSISTANT

## 2018-03-22 NOTE — PROGRESS NOTES
QUICK REFERENCE INFORMATION:  The ABCs of the Annual Wellness Visit    Subsequent Medicare Wellness Visit    HEALTH RISK ASSESSMENT    1940    Recent Hospitalizations:  No hospitalization(s) within the last year..        Current Medical Providers:  Patient Care Team:  Jorge Boss MD as PCP - General (Family Medicine)        Smoking Status:  History   Smoking Status   • Never Smoker   Smokeless Tobacco   • Never Used       Alcohol Consumption:  History   Alcohol Use No       Depression Screen:   PHQ-2/PHQ-9 Depression Screening 3/22/2018   Little interest or pleasure in doing things 0   Feeling down, depressed, or hopeless 0   Trouble falling or staying asleep, or sleeping too much 0   Feeling tired or having little energy 0   Poor appetite or overeating 0   Feeling bad about yourself - or that you are a failure or have let yourself or your family down 0   Trouble concentrating on things, such as reading the newspaper or watching television 0   Moving or speaking so slowly that other people could have noticed. Or the opposite - being so fidgety or restless that you have been moving around a lot more than usual 0   Thoughts that you would be better off dead, or of hurting yourself in some way 0   Total Score 0   If you checked off any problems, how difficult have these problems made it for you to do your work, take care of things at home, or get along with other people? Not difficult at all       Health Habits and Functional and Cognitive Screening:  Functional & Cognitive Status 3/22/2018   Do you have difficulty preparing food and eating? No   Do you have difficulty bathing yourself, getting dressed or grooming yourself? No   Do you have difficulty using the toilet? No   Do you have difficulty moving around from place to place? No   Do you have trouble with steps or getting out of a bed or a chair? No   In the past year have you fallen or experienced a near fall? No   Current Diet Well Balanced Diet    Dental Exam Not up to date   Eye Exam Up to date   Exercise (times per week) 1 times per week   Current Exercise Activities Include Walking   Do you need help using the phone?  No   Are you deaf or do you have serious difficulty hearing?  No   Do you need help with transportation? No   Do you need help shopping? No   Do you need help preparing meals?  No   Do you need help with housework?  No   Do you need help with laundry? No   Do you need help taking your medications? No   Do you need help managing money? No   Do you ever drive or ride in a car without wearing a seat belt? No   Have you felt unusual stress, anger or loneliness in the last month? Yes   Who do you live with? Alone   If you need help, do you have trouble finding someone available to you? Yes   Have you been bothered in the last four weeks by sexual problems? No   Do you have difficulty concentrating, remembering or making decisions? No     Fall Risk Assessment  Fallen in past 6 months: 0--> No  Mental Status: 0--> no mental status change  Mobility: 0--> No mobility issues  Medications: 0--> No meds  Total Fall Risk Score: 2    Does the patient have evidence of cognitive impairment? No    Aspirin use counseling: Does not need ASA (and currently is not on it)      Recent Lab Results:  CMP:  Lab Results   Component Value Date    BUN 16 09/19/2017    CREATININE 0.74 09/19/2017    EGFRIFNONA 76 09/19/2017    EGFRIFAFRI  09/07/2016      Comment:      <15 Indicative of kidney failure.    BCR 21.6 09/19/2017     09/19/2017    K 3.9 09/19/2017    CO2 33.5 (H) 09/19/2017    CALCIUM 9.7 09/19/2017    ALBUMIN 4.50 09/19/2017    LABIL2 1.6 09/19/2017    BILITOT 0.5 09/19/2017    ALKPHOS 88 09/19/2017    AST 26 09/19/2017    ALT 24 09/19/2017     Lipid Panel:  Lab Results   Component Value Date    CHOL 215 (H) 09/19/2017    TRIG 104 09/19/2017    HDL 82 09/19/2017    VLDL 20.8 09/19/2017    LDLHDL 1.37 09/19/2017     HbA1c:       Visual Acuity:   Visual  Acuity Screening    Right eye Left eye Both eyes   Without correction:      With correction: 20/25 20/20 20/25     Hearing acuity:  Whisper test  > 5 ft left ear  > 5 ft right ear    Age-appropriate Screening Schedule:  Refer to the list below for future screening recommendations based on patient's age, sex and/or medical conditions. Orders for these recommended tests are listed in the plan section. The patient has been provided with a written plan.    Health Maintenance   Topic Date Due   • TDAP/TD VACCINES (1 - Tdap) 03/16/1959   • PNEUMOCOCCAL VACCINES (65+ LOW/MEDIUM RISK) (2 of 2 - PPSV23) 05/05/2016   • LIPID PANEL  09/19/2018   • MAMMOGRAM  03/22/2020   • DXA SCAN  03/22/2020   • PAP SMEAR  03/22/2023   • COLONOSCOPY  04/21/2024   • INFLUENZA VACCINE  Completed   • ZOSTER VACCINE  Completed        Subjective   History of Present Illness    Rylee Hull is a 78 y.o. female who presents for an Subsequent Wellness Visit.    Osteoporosis-stable with Fosamax, calcium and vitamin D supplementation    Hyperlipidemia-stable with diet    Hypertension-well controlled with bisoprolol and Maxzide    Chronic allergic rhinitis-stable Zyrtec    The following portions of the patient's history were reviewed and updated as appropriate: allergies, current medications, past family history, past medical history, past social history, past surgical history and problem list.    Outpatient Medications Prior to Visit   Medication Sig Dispense Refill   • alendronate (FOSAMAX) 70 MG tablet Take 1 tablet by mouth Every 7 (Seven) Days. 12 tablet 3   • bisoprolol (ZEBeta) 10 MG tablet Take 1 tablet by mouth Every Night. 90 tablet 3   • cetirizine (zyrTEC) 10 MG tablet Take 1 tablet by mouth Daily. 90 tablet 3   • fluticasone (FLONASE) 50 MCG/ACT nasal spray Administer 2 sprays both nostrils once daily 3 bottle 3   • triamterene-hydrochlorothiazide (MAXZIDE-25) 37.5-25 MG per tablet Take 1 tablet by mouth Every Morning. 90 tablet 3    • azithromycin (ZITHROMAX Z-CONSUELO) 250 MG tablet Take 2 tablets the first day, then 1 tablet daily for 4 days. 6 tablet 0   • guaiFENesin (MUCINEX) 600 MG 12 hr tablet Take 1 tablet by mouth 2 (Two) Times a Day. 14 tablet 0   • oseltamivir (TAMIFLU) 75 MG capsule Take 1 capsule by mouth 2 (Two) Times a Day. 10 capsule 0     No facility-administered medications prior to visit.        Patient Active Problem List   Diagnosis   • Vitamin deficiency   • Osteopenia   • Mixed hyperlipidemia   • Essential hypertension   • Chronic allergic rhinitis   • Encounter for screening mammogram for breast cancer   • Healthcare maintenance   • Encounter for immunization   • Skin lesion       Advance Care Planning:  has NO advance directive - information provided to the patient today    Identification of Risk Factors:  Risk factors include: weight , cardiovascular risk, lack of transportation and polypharmacy.    Review of Systems   Constitutional: Negative for activity change, appetite change and fever.   HENT: Negative for ear pain, sinus pressure and sore throat.    Eyes: Negative for pain and visual disturbance.   Respiratory: Negative for cough and chest tightness.    Cardiovascular: Negative for chest pain and palpitations.   Gastrointestinal: Negative for abdominal pain, constipation, diarrhea, nausea and vomiting.   Endocrine: Negative for polydipsia and polyuria.   Genitourinary: Negative for dysuria and frequency.   Musculoskeletal: Negative for back pain and myalgias.   Skin: Negative for color change and rash.   Allergic/Immunologic: Negative for food allergies and immunocompromised state.   Neurological: Negative for dizziness, syncope and headaches.   Hematological: Negative for adenopathy. Does not bruise/bleed easily.   Psychiatric/Behavioral: Negative for hallucinations and suicidal ideas. The patient is not nervous/anxious.        Compared to one year ago, the patient feels her physical health is the same.  Compared  "to one year ago, the patient feels her mental health is the same.    Objective     Physical Exam   Constitutional: She is oriented to person, place, and time. She appears well-developed and well-nourished.   HENT:   Head: Normocephalic and atraumatic.   Nose: Nose normal.   Mouth/Throat: Oropharynx is clear and moist.   Eyes: Conjunctivae and EOM are normal. Pupils are equal, round, and reactive to light.   Neck: Normal range of motion. Neck supple. No tracheal deviation present. No thyromegaly present.   Cardiovascular: Normal rate, regular rhythm, normal heart sounds and intact distal pulses.    No murmur heard.  Pulmonary/Chest: Effort normal and breath sounds normal. No respiratory distress. She has no wheezes.   Abdominal: Soft. Bowel sounds are normal. There is no tenderness. There is no guarding.   Musculoskeletal: Normal range of motion. She exhibits no edema or tenderness.   Lymphadenopathy:     She has no cervical adenopathy.   Neurological: She is alert and oriented to person, place, and time.   Skin: Skin is warm and dry. No rash noted.   Psychiatric: She has a normal mood and affect. Her behavior is normal.   Nursing note and vitals reviewed.      Vitals:    03/22/18 0930   BP: 128/78   Pulse: 66   Temp: 98 °F (36.7 °C)   TempSrc: Oral   SpO2: 99%   Weight: 86.6 kg (191 lb)   Height: 169.5 cm (66.73\")   PainSc:   2       Body mass index is 30.16 kg/m².  Discussed the patient's BMI with her. BMI is above normal parameters. Follow-up plan includes:  exercise counseling and nutrition counseling.    Assessment/Plan   Patient Self-Management and Personalized Health Advice  The patient has been provided with information about: diet and designing advance directives and preventive services including:   · Advance directive, Fall Risk assessment done, Pneumococcal vaccine , Screening mammography, referral placed.    Visit Diagnoses:    ICD-10-CM ICD-9-CM   1. Medicare annual wellness visit, subsequent Z00.00 " V70.0   2. Encounter for screening mammogram for breast cancer Z12.31 V76.12   3. Healthcare maintenance Z00.00 V70.0   4. Encounter for screening for osteoporosis Z13.820 V82.81   5. Age-related osteoporosis without current pathological fracture  M81.0 733.01   6. Mixed hyperlipidemia E78.2 272.2   7. Essential hypertension I10 401.9   8. Chronic nonseasonal allergic rhinitis due to pollen J30.89 477.0       Orders Placed This Encounter   Procedures   • DEXA Bone Density Axial     Standing Status:   Future     Standing Expiration Date:   3/22/2019     Scheduling Instructions:      Schedule same day/time as mammogram      Saturday if possible     Order Specific Question:   Reason for Exam:     Answer:   screen osteoporosis   • Mammo Screening Digital Tomosynthesis Bilateral With CAD     Standing Status:   Future     Standing Expiration Date:   3/22/2019     Scheduling Instructions:      Schedule same day/time as dexa scan      Saturday if possible     Order Specific Question:   Reason for Exam:     Answer:   screen breast cancer   • Pneumococcal Polysaccharide Vaccine 23-Valent Greater Than or Equal To 1yo Subcutaneous / IM       Outpatient Encounter Prescriptions as of 3/22/2018   Medication Sig Dispense Refill   • alendronate (FOSAMAX) 70 MG tablet Take 1 tablet by mouth Every 7 (Seven) Days. 12 tablet 3   • bisoprolol (ZEBeta) 10 MG tablet Take 1 tablet by mouth Every Night. 90 tablet 3   • cetirizine (zyrTEC) 10 MG tablet Take 1 tablet by mouth Daily. 90 tablet 3   • fluticasone (FLONASE) 50 MCG/ACT nasal spray Administer 2 sprays both nostrils once daily 3 bottle 3   • triamterene-hydrochlorothiazide (MAXZIDE-25) 37.5-25 MG per tablet Take 1 tablet by mouth Every Morning. 90 tablet 3   • [DISCONTINUED] azithromycin (ZITHROMAX Z-CONSUELO) 250 MG tablet Take 2 tablets the first day, then 1 tablet daily for 4 days. 6 tablet 0   • [DISCONTINUED] guaiFENesin (MUCINEX) 600 MG 12 hr tablet Take 1 tablet by mouth 2 (Two)  Times a Day. 14 tablet 0   • [DISCONTINUED] oseltamivir (TAMIFLU) 75 MG capsule Take 1 capsule by mouth 2 (Two) Times a Day. 10 capsule 0     No facility-administered encounter medications on file as of 3/22/2018.        Reviewed use of high risk medication in the elderly: yes  Reviewed for potential of harmful drug interactions in the elderly: yes    Follow Up:  No Follow-up on file.     An After Visit Summary and PPPS with all of these plans were given to the patient.

## 2018-03-26 ENCOUNTER — OFFICE VISIT (OUTPATIENT)
Dept: FAMILY MEDICINE CLINIC | Facility: CLINIC | Age: 78
End: 2018-03-26

## 2018-03-26 VITALS
RESPIRATION RATE: 12 BRPM | OXYGEN SATURATION: 97 % | DIASTOLIC BLOOD PRESSURE: 70 MMHG | HEIGHT: 66 IN | SYSTOLIC BLOOD PRESSURE: 125 MMHG | TEMPERATURE: 98 F | HEART RATE: 68 BPM | BODY MASS INDEX: 30.7 KG/M2 | WEIGHT: 191 LBS

## 2018-03-26 DIAGNOSIS — L98.9 SKIN LESION: ICD-10-CM

## 2018-03-26 DIAGNOSIS — E56.9 VITAMIN DEFICIENCY: ICD-10-CM

## 2018-03-26 DIAGNOSIS — M85.80 OSTEOPENIA, UNSPECIFIED LOCATION: ICD-10-CM

## 2018-03-26 DIAGNOSIS — E78.2 MIXED HYPERLIPIDEMIA: Primary | ICD-10-CM

## 2018-03-26 DIAGNOSIS — J30.2 CHRONIC SEASONAL ALLERGIC RHINITIS DUE TO OTHER ALLERGEN: ICD-10-CM

## 2018-03-26 DIAGNOSIS — Z00.00 HEALTHCARE MAINTENANCE: ICD-10-CM

## 2018-03-26 DIAGNOSIS — I10 ESSENTIAL HYPERTENSION: ICD-10-CM

## 2018-03-26 DIAGNOSIS — J30.89 CHRONIC NON-SEASONAL ALLERGIC RHINITIS, UNSPECIFIED TRIGGER: ICD-10-CM

## 2018-03-26 PROCEDURE — 99214 OFFICE O/P EST MOD 30 MIN: CPT | Performed by: GENERAL PRACTICE

## 2018-03-26 RX ORDER — BISOPROLOL FUMARATE 10 MG/1
10 TABLET, FILM COATED ORAL NIGHTLY
Qty: 90 TABLET | Refills: 3 | Status: SHIPPED | OUTPATIENT
Start: 2018-03-26 | End: 2019-01-28 | Stop reason: SDUPTHER

## 2018-03-26 RX ORDER — FLUTICASONE PROPIONATE 50 MCG
SPRAY, SUSPENSION (ML) NASAL
Qty: 3 BOTTLE | Refills: 3 | Status: SHIPPED | OUTPATIENT
Start: 2018-03-26 | End: 2019-01-28 | Stop reason: SDUPTHER

## 2018-03-26 RX ORDER — CETIRIZINE HYDROCHLORIDE 10 MG/1
10 TABLET ORAL DAILY
Qty: 90 TABLET | Refills: 3 | Status: SHIPPED | OUTPATIENT
Start: 2018-03-26 | End: 2019-01-28 | Stop reason: SDUPTHER

## 2018-03-26 RX ORDER — ALENDRONATE SODIUM 70 MG/1
70 TABLET ORAL
Qty: 12 TABLET | Refills: 3 | Status: SHIPPED | OUTPATIENT
Start: 2018-03-26 | End: 2018-06-18

## 2018-03-26 RX ORDER — TRIAMTERENE AND HYDROCHLOROTHIAZIDE 37.5; 25 MG/1; MG/1
1 TABLET ORAL EVERY MORNING
Qty: 90 TABLET | Refills: 3 | Status: SHIPPED | OUTPATIENT
Start: 2018-03-26 | End: 2018-06-18

## 2018-03-26 NOTE — PROGRESS NOTES
Subjective   Rylee Hull is a 78 y.o. female.     History of Present Illness     Chronic Allergic Rhinitis  Patient has a history of allergic rhinitis. Patient's symptoms include sneezing, clear rhinorrhea and nasal congestion. These symptoms are perennial with seasonal exacerbation. Current triggers include exposure to pollens, weeds and dust. The patient has been suffering from these symptoms for a number of years.  She has been worse over the last month.  The patient has tried OTC antihistamine - cetirizine and OTC nasal corticosteroid - fluticasone with good relief of symptoms in the past.  She was able to hold these through winter and has yet to resume them    Hypertension  Home blood pressure readings: not doing. Associated signs and symptoms: none. Patient denies: chest pain, palpitations, dyspnea, orthopnea, paroxysmal nocturnal dyspnea and peripheral edema. Current antihypertensive medications includes bisoprolol and triamterene/hydrochlorthiazine. Medication compliance: taking as prescribed.     Dyslipidemia  Compliance with treatment has been excellent. The patient exercises daily. She is currently being prescribed the following medication for her dyslipidemia - lifestyle modifcation.  She has had no recent labs but is fasting today    The following portions of the patient's history were reviewed and updated as appropriate: allergies, current medications, past medical history, past social history and problem list.    Review of Systems   Constitutional: Negative for appetite change, chills, fatigue, fever and unexpected weight change.   HENT: Positive for congestion, rhinorrhea and sneezing. Negative for ear pain, sore throat and voice change.    Eyes: Negative for visual disturbance.   Respiratory: Negative for cough, shortness of breath and wheezing.    Cardiovascular: Negative for chest pain, palpitations and leg swelling.   Gastrointestinal: Negative for abdominal pain, blood in stool,  constipation, diarrhea, nausea and vomiting.   Endocrine: Negative for polydipsia.   Genitourinary: Negative for difficulty urinating, dysuria, frequency, hematuria, menstrual problem, pelvic pain, urgency, vaginal bleeding and vaginal discharge.   Musculoskeletal: Negative for arthralgias, back pain, joint swelling, myalgias and neck pain.   Skin: Negative for color change.   Neurological: Negative for tremors, weakness, numbness and headaches.   Psychiatric/Behavioral: Negative for dysphoric mood, sleep disturbance and suicidal ideas. The patient is not nervous/anxious.      Objective   Physical Exam   Constitutional: She is oriented to person, place, and time. No distress.   Appeared younger then stated age. Bright and in good spirits. No apparent distress. No pallor, jaundice, diaphoresis, or cyanosis.     HENT:   Head: Atraumatic.   Right Ear: Tympanic membrane, external ear and ear canal normal.   Left Ear: Tympanic membrane, external ear and ear canal normal.   Nose: Nose normal.   Mouth/Throat: Oropharynx is clear and moist. Mucous membranes are not pale and not cyanotic.   Eyes: EOM are normal. Pupils are equal, round, and reactive to light. No scleral icterus.   Neck: No JVD present. Carotid bruit is not present. No tracheal deviation present. No thyromegaly present.   Cardiovascular: Normal rate, regular rhythm, S1 normal, S2 normal and intact distal pulses.  Exam reveals no gallop, no S3 and no S4.    No murmur heard.  Pulmonary/Chest: Breath sounds normal. No stridor. No respiratory distress.   Abdominal: Soft. Normal aorta and bowel sounds are normal. She exhibits no distension, no abdominal bruit and no mass. There is no hepatosplenomegaly. There is no tenderness. No hernia.   Musculoskeletal: She exhibits no tenderness or deformity.     Vascular Status -  Her right foot exhibits normal right foot edema. Her left foot exhibits normal left foot edema.  Lymphadenopathy:        Head (right side): No  submandibular adenopathy present.        Head (left side): No submandibular adenopathy present.     She has no cervical adenopathy.   Neurological: She is alert and oriented to person, place, and time. She has normal reflexes. She displays normal reflexes. No cranial nerve deficit. She exhibits normal muscle tone. Coordination normal.   Skin: Skin is warm and dry. Lesion (1 cm slightly irregular well demarcated slightly scaly erythematous macule along the right medial jawline) noted. No rash noted. She is not diaphoretic. No cyanosis. No pallor. Nails show no clubbing.   Psychiatric: She has a normal mood and affect.     Assessment/Plan   Problems Addressed this Visit        Cardiovascular and Mediastinum    Mixed hyperlipidemia   Encouraged to continue to work on her diet and exercise plan.  Fasting labs drawn     Relevant Orders    Lipid Panel    TSH    Essential hypertension  As above.   Continue current medication.    Relevant Medications    triamterene-hydrochlorothiazide (MAXZIDE-25) 37.5-25 MG per tablet    bisoprolol (ZEBeta) 10 MG tablet    Other Relevant Orders    CBC & Differential    Comprehensive Metabolic Panel       Respiratory    Chronic non-seasonal allergic rhinitis  Reminded regarding allergen avoidance.  Encouraged to resume her nasal fluticasone and oral cetirizine        Digestive    Vitamin deficiency  Will continue to monitor    Relevant Orders    Vitamin D 25 Hydroxy       Musculoskeletal and Integument    Osteopenia  Encouraged to continue to pursue weight bearing activities while exercising joint protection.  Reminded to follow-up with her scheduled DEXA scan     Relevant Medications    alendronate (FOSAMAX) 70 MG tablet    Other Relevant Orders    Vitamin D 25 Hydroxy    Skin lesion  Appears stable.  Patient will continue to monitor and report if she should note any change whatsoever        Other    Healthcare maintenance  Reminded to follow-up with her scheduled mammogram  Prescriptions  written for both a Tdap and shingrix    Relevant Medications    Zoster Vac Recomb Adjuvanted (SHINGRIX) 50 MCG reconstituted suspension    Tdap (BOOSTRIX) 5-2.5-18.5 LF-MCG/0.5 injection

## 2018-04-20 ENCOUNTER — APPOINTMENT (OUTPATIENT)
Dept: MAMMOGRAPHY | Facility: HOSPITAL | Age: 78
End: 2018-04-20

## 2018-04-20 ENCOUNTER — APPOINTMENT (OUTPATIENT)
Dept: BONE DENSITY | Facility: HOSPITAL | Age: 78
End: 2018-04-20

## 2018-05-23 ENCOUNTER — HOSPITAL ENCOUNTER (OUTPATIENT)
Dept: MAMMOGRAPHY | Facility: HOSPITAL | Age: 78
Discharge: HOME | End: 2018-05-23

## 2018-05-23 ENCOUNTER — HOSPITAL ENCOUNTER (OUTPATIENT)
Dept: BONE DENSITY | Facility: HOSPITAL | Age: 78
Discharge: HOME | End: 2018-05-23
Admitting: PHYSICIAN ASSISTANT

## 2018-05-23 DIAGNOSIS — M81.0 AGE-RELATED OSTEOPOROSIS WITHOUT CURRENT PATHOLOGICAL FRACTURE: ICD-10-CM

## 2018-05-23 DIAGNOSIS — Z13.820 ENCOUNTER FOR SCREENING FOR OSTEOPOROSIS: ICD-10-CM

## 2018-05-23 DIAGNOSIS — Z12.31 ENCOUNTER FOR SCREENING MAMMOGRAM FOR BREAST CANCER: ICD-10-CM

## 2018-05-23 PROCEDURE — 77080 DXA BONE DENSITY AXIAL: CPT

## 2018-05-23 PROCEDURE — 77063 BREAST TOMOSYNTHESIS BI: CPT | Performed by: RADIOLOGY

## 2018-05-23 PROCEDURE — 77067 SCR MAMMO BI INCL CAD: CPT | Performed by: RADIOLOGY

## 2018-05-23 PROCEDURE — 77063 BREAST TOMOSYNTHESIS BI: CPT

## 2018-05-23 PROCEDURE — 77067 SCR MAMMO BI INCL CAD: CPT

## 2018-05-23 PROCEDURE — 77080 DXA BONE DENSITY AXIAL: CPT | Performed by: RADIOLOGY

## 2018-06-04 ENCOUNTER — OFFICE VISIT (OUTPATIENT)
Dept: FAMILY MEDICINE CLINIC | Facility: CLINIC | Age: 78
End: 2018-06-04

## 2018-06-04 VITALS
TEMPERATURE: 98.2 F | WEIGHT: 191 LBS | HEART RATE: 68 BPM | SYSTOLIC BLOOD PRESSURE: 132 MMHG | BODY MASS INDEX: 30.7 KG/M2 | OXYGEN SATURATION: 98 % | DIASTOLIC BLOOD PRESSURE: 88 MMHG | HEIGHT: 66 IN

## 2018-06-04 DIAGNOSIS — I10 ESSENTIAL HYPERTENSION: ICD-10-CM

## 2018-06-04 DIAGNOSIS — J30.89 CHRONIC NON-SEASONAL ALLERGIC RHINITIS, UNSPECIFIED TRIGGER: ICD-10-CM

## 2018-06-04 DIAGNOSIS — E78.2 MIXED HYPERLIPIDEMIA: ICD-10-CM

## 2018-06-04 DIAGNOSIS — M10.071 ACUTE IDIOPATHIC GOUT INVOLVING TOE OF RIGHT FOOT: Primary | ICD-10-CM

## 2018-06-04 DIAGNOSIS — M71.30 SYNOVIAL CYST: ICD-10-CM

## 2018-06-04 DIAGNOSIS — L03.011 CELLULITIS OF FINGER OF RIGHT HAND: ICD-10-CM

## 2018-06-04 PROCEDURE — 99214 OFFICE O/P EST MOD 30 MIN: CPT | Performed by: PHYSICIAN ASSISTANT

## 2018-06-04 PROCEDURE — 96372 THER/PROPH/DIAG INJ SC/IM: CPT | Performed by: PHYSICIAN ASSISTANT

## 2018-06-04 RX ORDER — CEPHALEXIN 500 MG/1
500 CAPSULE ORAL 3 TIMES DAILY
Qty: 30 CAPSULE | Refills: 0 | Status: SHIPPED | OUTPATIENT
Start: 2018-06-04 | End: 2018-06-14

## 2018-06-04 RX ORDER — COLCHICINE 0.6 MG/1
0.6 TABLET ORAL SEE ADMIN INSTRUCTIONS
Qty: 3 TABLET | Refills: 0 | Status: SHIPPED | OUTPATIENT
Start: 2018-06-04 | End: 2018-06-18 | Stop reason: SDUPTHER

## 2018-06-04 RX ORDER — METHYLPREDNISOLONE ACETATE 80 MG/ML
80 INJECTION, SUSPENSION INTRA-ARTICULAR; INTRALESIONAL; INTRAMUSCULAR; SOFT TISSUE ONCE
Status: COMPLETED | OUTPATIENT
Start: 2018-06-04 | End: 2018-06-04

## 2018-06-04 RX ADMIN — METHYLPREDNISOLONE ACETATE 80 MG: 80 INJECTION, SUSPENSION INTRA-ARTICULAR; INTRALESIONAL; INTRAMUSCULAR; SOFT TISSUE at 12:06

## 2018-06-04 NOTE — PROGRESS NOTES
Subjective   Rylee Hull is a 78 y.o. female.     Chief complaint-toe pain    History of Present Illness     Toe pain-  She complains of sudden onset of severe throbbing pain in the right proximal toe joint.  Onset 6 days ago.  Minimal relief with ibuprofen.  She reports her mother has history of gout.    Finger pain-  She complains of chronic bumps on her DIP joints but states that one bump got much larger over the past week.  She accidentally hit it causing yellow pus to come out yesterday.  Onset one week ago.    Hypertension-well controlled with Maxzide    Dyslipidemia  Compliance with treatment has been good. The patient exercises intermittently. She is currently being prescribed the following medication for her dyslipidemia - lifestyle modifcation. Patient denies side effects associated with her medications. Most recent lipids include  Lab Results   Component Value Date    TRIG 104 09/19/2017    TRIG 105 09/07/2016    HDL 82 09/19/2017    HDL 88 09/07/2016     (H) 09/19/2017     (H) 09/07/2016   Stable with diet    Chronic allergic rhinitis-stable with Zyrtec      The following portions of the patient's history were reviewed and updated as appropriate: allergies, current medications, past family history, past medical history, past social history, past surgical history and problem list.    Review of Systems   Constitutional: Negative for activity change, appetite change and fever.   HENT: Negative for ear pain, sinus pressure and sore throat.    Eyes: Negative for pain and visual disturbance.   Respiratory: Negative for cough and chest tightness.    Cardiovascular: Negative for chest pain and palpitations.   Gastrointestinal: Negative for abdominal pain, constipation, diarrhea, nausea and vomiting.   Endocrine: Negative for polydipsia and polyuria.   Genitourinary: Negative for dysuria and frequency.   Musculoskeletal: Positive for arthralgias and joint swelling. Negative for back pain and  "myalgias.        Right toe pain and right index finger pain   Skin: Negative for color change and rash.   Allergic/Immunologic: Negative for food allergies and immunocompromised state.   Neurological: Negative for dizziness, syncope and headaches.   Hematological: Negative for adenopathy. Does not bruise/bleed easily.   Psychiatric/Behavioral: Negative for hallucinations and suicidal ideas. The patient is not nervous/anxious.        /88   Pulse 68   Temp 98.2 °F (36.8 °C) (Oral)   Ht 167.6 cm (65.98\")   Wt 86.6 kg (191 lb)   SpO2 98%   BMI 30.84 kg/m²     Physical Exam   Constitutional: She is oriented to person, place, and time. She appears well-developed and well-nourished.   HENT:   Head: Normocephalic and atraumatic.   Nose: Nose normal.   Mouth/Throat: Oropharynx is clear and moist.   Eyes: Conjunctivae and EOM are normal. Pupils are equal, round, and reactive to light.   Neck: Normal range of motion. Neck supple. No tracheal deviation present. No thyromegaly present.   Cardiovascular: Normal rate, regular rhythm, normal heart sounds and intact distal pulses.    No murmur heard.  Pulmonary/Chest: Effort normal and breath sounds normal. No respiratory distress. She has no wheezes.   Abdominal: Soft. Bowel sounds are normal. There is no tenderness. There is no guarding.   Musculoskeletal: She exhibits edema and tenderness.   Right foot first PIP joint erythematous red and tender to palpation.  Right index finger DIP joint cyst noted with rupture.  Finger is slightly erythematous without tenderness.   Lymphadenopathy:     She has no cervical adenopathy.   Neurological: She is alert and oriented to person, place, and time.   Skin: Skin is warm and dry. No rash noted.   Psychiatric: She has a normal mood and affect. Her behavior is normal.   Nursing note and vitals reviewed.      Assessment/Plan     Diagnoses and all orders for this visit:    Acute idiopathic gout involving toe of right foot  -     " colchicine 0.6 MG tablet; Take 1 tablet by mouth See Admin Instructions. 2 tabs now then 1 tab in 2 hours  -     methylPREDNISolone acetate (DEPO-medrol) injection 80 mg; Inject 1 mL into the shoulder, thigh, or buttocks 1 (One) Time.    Cellulitis of finger of right hand  -     cephalexin (KEFLEX) 500 MG capsule; Take 1 capsule by mouth 3 (Three) Times a Day for 10 days.    Synovial cyst    Essential hypertension    Mixed hyperlipidemia    Chronic non-seasonal allergic rhinitis, unspecified trigger                 This document has been electronically signed by:  Ro Delgado PA-C

## 2018-06-18 ENCOUNTER — OFFICE VISIT (OUTPATIENT)
Dept: FAMILY MEDICINE CLINIC | Facility: CLINIC | Age: 78
End: 2018-06-18

## 2018-06-18 VITALS
SYSTOLIC BLOOD PRESSURE: 125 MMHG | OXYGEN SATURATION: 96 % | HEIGHT: 66 IN | WEIGHT: 191 LBS | BODY MASS INDEX: 30.7 KG/M2 | HEART RATE: 63 BPM | TEMPERATURE: 97.7 F | RESPIRATION RATE: 12 BRPM | DIASTOLIC BLOOD PRESSURE: 75 MMHG

## 2018-06-18 DIAGNOSIS — Z87.39 HISTORY OF ACUTE GOUTY ARTHRITIS: ICD-10-CM

## 2018-06-18 DIAGNOSIS — E56.9 VITAMIN DEFICIENCY: ICD-10-CM

## 2018-06-18 DIAGNOSIS — M85.80 OSTEOPENIA, UNSPECIFIED LOCATION: ICD-10-CM

## 2018-06-18 DIAGNOSIS — Z00.00 HEALTHCARE MAINTENANCE: ICD-10-CM

## 2018-06-18 DIAGNOSIS — I10 ESSENTIAL HYPERTENSION: Primary | ICD-10-CM

## 2018-06-18 DIAGNOSIS — E78.2 MIXED HYPERLIPIDEMIA: ICD-10-CM

## 2018-06-18 DIAGNOSIS — J30.89 CHRONIC NON-SEASONAL ALLERGIC RHINITIS, UNSPECIFIED TRIGGER: ICD-10-CM

## 2018-06-18 DIAGNOSIS — L98.9 SKIN LESION: ICD-10-CM

## 2018-06-18 LAB
25(OH)D3 SERPL-MCNC: 34 NG/ML
ALBUMIN SERPL-MCNC: 4.6 G/DL (ref 3.4–4.8)
ALBUMIN/GLOB SERPL: 1.5 G/DL (ref 1.5–2.5)
ALP SERPL-CCNC: 95 U/L (ref 35–104)
ALT SERPL W P-5'-P-CCNC: 22 U/L (ref 10–36)
ANION GAP SERPL CALCULATED.3IONS-SCNC: 7.8 MMOL/L (ref 3.6–11.2)
AST SERPL-CCNC: 29 U/L (ref 10–30)
BASOPHILS # BLD AUTO: 0.02 10*3/MM3 (ref 0–0.3)
BASOPHILS NFR BLD AUTO: 0.3 % (ref 0–2)
BILIRUB SERPL-MCNC: 0.4 MG/DL (ref 0.2–1.8)
BUN BLD-MCNC: 14 MG/DL (ref 7–21)
BUN/CREAT SERPL: 20.3 (ref 7–25)
CALCIUM SPEC-SCNC: 9.8 MG/DL (ref 7.7–10)
CHLORIDE SERPL-SCNC: 103 MMOL/L (ref 99–112)
CO2 SERPL-SCNC: 28.2 MMOL/L (ref 24.3–31.9)
CREAT BLD-MCNC: 0.69 MG/DL (ref 0.43–1.29)
CRP SERPL-MCNC: 0.99 MG/DL (ref 0–0.99)
DEPRECATED RDW RBC AUTO: 40.2 FL (ref 37–54)
EOSINOPHIL # BLD AUTO: 0.25 10*3/MM3 (ref 0–0.7)
EOSINOPHIL NFR BLD AUTO: 3.3 % (ref 0–7)
ERYTHROCYTE [DISTWIDTH] IN BLOOD BY AUTOMATED COUNT: 12.8 % (ref 11.5–14.5)
ERYTHROCYTE [SEDIMENTATION RATE] IN BLOOD: 16 MM/HR (ref 0–30)
GFR SERPL CREATININE-BSD FRML MDRD: 82 ML/MIN/1.73
GLOBULIN UR ELPH-MCNC: 3 GM/DL
GLUCOSE BLD-MCNC: 96 MG/DL (ref 70–110)
HCT VFR BLD AUTO: 39.9 % (ref 37–47)
HGB BLD-MCNC: 13.4 G/DL (ref 12–16)
IMM GRANULOCYTES # BLD: 0.01 10*3/MM3 (ref 0–0.03)
IMM GRANULOCYTES NFR BLD: 0.1 % (ref 0–0.5)
LYMPHOCYTES # BLD AUTO: 1.92 10*3/MM3 (ref 1–3)
LYMPHOCYTES NFR BLD AUTO: 25.4 % (ref 16–46)
MCH RBC QN AUTO: 29.5 PG (ref 27–33)
MCHC RBC AUTO-ENTMCNC: 33.6 G/DL (ref 33–37)
MCV RBC AUTO: 87.9 FL (ref 80–94)
MONOCYTES # BLD AUTO: 0.55 10*3/MM3 (ref 0.1–0.9)
MONOCYTES NFR BLD AUTO: 7.3 % (ref 0–12)
NEUTROPHILS # BLD AUTO: 4.8 10*3/MM3 (ref 1.4–6.5)
NEUTROPHILS NFR BLD AUTO: 63.6 % (ref 40–75)
OSMOLALITY SERPL CALC.SUM OF ELEC: 277.9 MOSM/KG (ref 273–305)
PLATELET # BLD AUTO: 397 10*3/MM3 (ref 130–400)
PMV BLD AUTO: 9 FL (ref 6–10)
POTASSIUM BLD-SCNC: 3.9 MMOL/L (ref 3.5–5.3)
PROT SERPL-MCNC: 7.6 G/DL (ref 6–8)
RBC # BLD AUTO: 4.54 10*6/MM3 (ref 4.2–5.4)
SODIUM BLD-SCNC: 139 MMOL/L (ref 135–153)
URATE SERPL-MCNC: 7.8 MG/DL (ref 2.4–5.7)
WBC NRBC COR # BLD: 7.55 10*3/MM3 (ref 4.5–12.5)

## 2018-06-18 PROCEDURE — 80053 COMPREHEN METABOLIC PANEL: CPT | Performed by: GENERAL PRACTICE

## 2018-06-18 PROCEDURE — 85025 COMPLETE CBC W/AUTO DIFF WBC: CPT | Performed by: GENERAL PRACTICE

## 2018-06-18 PROCEDURE — 99214 OFFICE O/P EST MOD 30 MIN: CPT | Performed by: GENERAL PRACTICE

## 2018-06-18 PROCEDURE — 84550 ASSAY OF BLOOD/URIC ACID: CPT | Performed by: GENERAL PRACTICE

## 2018-06-18 PROCEDURE — 36415 COLL VENOUS BLD VENIPUNCTURE: CPT | Performed by: GENERAL PRACTICE

## 2018-06-18 PROCEDURE — 85652 RBC SED RATE AUTOMATED: CPT | Performed by: GENERAL PRACTICE

## 2018-06-18 PROCEDURE — 86140 C-REACTIVE PROTEIN: CPT | Performed by: GENERAL PRACTICE

## 2018-06-18 PROCEDURE — 82306 VITAMIN D 25 HYDROXY: CPT | Performed by: GENERAL PRACTICE

## 2018-06-18 RX ORDER — COLCHICINE 0.6 MG/1
0.6 TABLET ORAL DAILY
Qty: 30 TABLET | Refills: 0 | Status: SHIPPED | OUTPATIENT
Start: 2018-06-18 | End: 2019-01-28 | Stop reason: SDUPTHER

## 2018-06-18 RX ORDER — LOSARTAN POTASSIUM 50 MG/1
50 TABLET ORAL EVERY MORNING
Qty: 30 TABLET | Refills: 5 | Status: SHIPPED | OUTPATIENT
Start: 2018-06-18 | End: 2018-09-19 | Stop reason: SDUPTHER

## 2018-06-18 NOTE — PROGRESS NOTES
Subjective   Rylee Hull is a 78 y.o. female.     History of Present Illness     Acute Gouty Arthritis  There has been some improvement in the pain, swelling, and redness about the base of her right great toe.  There have been no new associated symptoms.  She has never had any episodes like this in the past but is on a thiazide diuretic and has a family history of gout\    Skin Lesion  There has been no change in the lesion on the dorsum of her right index finger.  This has been associated with mild tenderness and since last here she stuck a needle in it with drainage of some thick yellowish material.  She denies any limitation of the use of that finger.  She has never had any episodes like this in the past.  She works around roses    Chronic Allergic Rhinitis  Patient has a history of allergic rhinitis. Patient's symptoms include sneezing, clear rhinorrhea and nasal congestion. These symptoms are perennial with seasonal exacerbation. Current triggers include exposure to pollens, weeds and dust. The patient has been suffering from these symptoms for a number of years.  She has been worse through spring.  The patient has tried OTC antihistamine - cetirizine and OTC nasal corticosteroid - fluticasone with good relief of symptoms in the past.      Hypertension  Home blood pressure readings: not doing. Associated signs and symptoms: none. Patient denies: chest pain, palpitations, dyspnea, orthopnea, paroxysmal nocturnal dyspnea and peripheral edema. Current antihypertensive medications includes bisoprolol and triamterene/hydrochlorthiazine. Medication compliance: taking as prescribed.     Dyslipidemia  Compliance with treatment has been excellent. The patient exercises daily. She is currently being prescribed the following medication for her dyslipidemia - lifestyle modifcation.  She has had no recent labs    Imaging  DEXA scan performed on 5/23/18 returned with a T score as low as -1.2    The following portions  of the patient's history were reviewed and updated as appropriate: allergies, current medications, past family history, past medical history, past social history and problem list.    Review of Systems   Constitutional: Negative for appetite change, chills, fatigue, fever and unexpected weight change.   HENT: Positive for congestion, rhinorrhea and sneezing. Negative for ear pain, sore throat and voice change.    Eyes: Negative for visual disturbance.   Respiratory: Negative for cough, shortness of breath and wheezing.    Cardiovascular: Negative for chest pain, palpitations and leg swelling.   Gastrointestinal: Negative for abdominal pain, blood in stool, constipation, diarrhea, nausea and vomiting.   Endocrine: Negative for polydipsia.   Genitourinary: Negative for difficulty urinating, dysuria, frequency, hematuria, menstrual problem, pelvic pain, urgency, vaginal bleeding and vaginal discharge.   Musculoskeletal: Positive for arthralgias and joint swelling. Negative for back pain, myalgias and neck pain.   Skin: Negative for color change.        Lesion right index finger   Neurological: Negative for tremors, weakness, numbness and headaches.   Psychiatric/Behavioral: Negative for dysphoric mood, sleep disturbance and suicidal ideas. The patient is not nervous/anxious.      Objective   Physical Exam   Constitutional: She is oriented to person, place, and time. No distress.   Appeared younger then stated age. Bright and in good spirits. Mild right antalgic gait. No apparent distress at rest. No pallor, jaundice, diaphoresis, or cyanosis.     HENT:   Head: Atraumatic.   Right Ear: Tympanic membrane, external ear and ear canal normal.   Left Ear: Tympanic membrane, external ear and ear canal normal.   Nose: Nose normal.   Mouth/Throat: Oropharynx is clear and moist. Mucous membranes are not pale and not cyanotic.   Eyes: EOM are normal. Pupils are equal, round, and reactive to light. No scleral icterus.   Neck: No  JVD present. Carotid bruit is not present. No tracheal deviation present. No thyromegaly present.   Cardiovascular: Normal rate, regular rhythm, S1 normal, S2 normal and intact distal pulses.  Exam reveals no gallop, no S3 and no S4.    No murmur heard.  Pulmonary/Chest: Breath sounds normal. No stridor. No respiratory distress.   Abdominal: Soft. Normal aorta and bowel sounds are normal. She exhibits no distension, no abdominal bruit and no mass. There is no hepatosplenomegaly. There is no tenderness. No hernia.   Musculoskeletal: She exhibits no tenderness or deformity.   Violaceous erythema and mild swelling about the right first MTP joint.  Mild tenderness and warmth     Vascular Status -  Her right foot exhibits no edema. Her left foot exhibits no edema.  Lymphadenopathy:        Head (right side): No submandibular adenopathy present.        Head (left side): No submandibular adenopathy present.     She has no cervical adenopathy.   Neurological: She is alert and oriented to person, place, and time. She has normal reflexes. She displays normal reflexes. No cranial nerve deficit. She exhibits normal muscle tone. Coordination normal.   Skin: Skin is warm and dry. No rash noted. Lesion: 1 cm oval erythematous cystic nodule with visible whitish yellowish material within over the dorsum of the DIP joint right index finger. She is not diaphoretic. No cyanosis. No pallor. Nails show no clubbing.   Psychiatric: She has a normal mood and affect.     Assessment/Plan   Problems Addressed this Visit        Cardiovascular and Mediastinum    Mixed hyperlipidemia  Encouraged to continue to work on her diet and exercise plan.    Essential hypertension  As above.  Triamterene/hydrochlorothiazide will be replaced with losartan given her recent gouty arthritis     Relevant Medications    losartan (COZAAR) 50 MG tablet    Other Relevant Orders    CBC & Differential (Completed)    Comprehensive Metabolic Panel (Completed)    CBC  Auto Differential (Completed)    Osmolality, Calculated (Completed)       Respiratory    Chronic non-seasonal allergic rhinitis  Reminded regarding allergen avoidance.  Continue current medication       Digestive    Vitamin deficiency  Will continue to monitor    Relevant Orders    Vitamin D 25 Hydroxy (Completed)       Musculoskeletal and Integument    Osteopenia  Encouraged to continue to pursue weight bearing activities while exercising joint protection.  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  Patient will report if any worse, any new lesions, or if any new symptoms        Other    Healthcare maintenance  Encouraged to follow up with shingrix.    History of acute gouty arthritis  Advise regarding appropriate diet  Colchicine will be kept on hand in the event of another attack   Labs drawn     Relevant Medications    losartan (COZAAR) 50 MG tablet    colchicine 0.6 MG tablet    Other Relevant Orders    Uric Acid (Completed)    Sedimentation Rate (Completed)    C-reactive Protein (Completed)

## 2018-07-27 ENCOUNTER — TELEPHONE (OUTPATIENT)
Dept: FAMILY MEDICINE CLINIC | Facility: CLINIC | Age: 78
End: 2018-07-27

## 2018-07-27 RX ORDER — HYDROXYZINE HYDROCHLORIDE 25 MG/1
25 TABLET, FILM COATED ORAL 3 TIMES DAILY PRN
Qty: 90 TABLET | Refills: 0 | Status: SHIPPED | OUTPATIENT
Start: 2018-07-27 | End: 2018-09-24

## 2018-07-27 NOTE — TELEPHONE ENCOUNTER
Pts son in law has been informed of this information.   ----- Message from Jorge Boss MD sent at 7/27/2018  1:24 PM EDT -----  Emailed  Script for hydroxyzine    ----- Message -----  From: Nallely Johnson MA  Sent: 7/27/2018  10:00 AM  To: Jorge Boss MD    Patients son in law called and stated that her son has committed suicide and she is tore all to pieces about it. They want to see if you could send her in something mild to help her with her anxiety.

## 2018-09-19 DIAGNOSIS — I10 ESSENTIAL HYPERTENSION: ICD-10-CM

## 2018-09-19 DIAGNOSIS — Z87.39 HISTORY OF ACUTE GOUTY ARTHRITIS: ICD-10-CM

## 2018-09-19 RX ORDER — LOSARTAN POTASSIUM 50 MG/1
50 TABLET ORAL EVERY MORNING
Qty: 90 TABLET | Refills: 3 | Status: SHIPPED | OUTPATIENT
Start: 2018-09-19 | End: 2019-01-28 | Stop reason: SDUPTHER

## 2018-09-21 ENCOUNTER — LAB (OUTPATIENT)
Dept: FAMILY MEDICINE CLINIC | Facility: CLINIC | Age: 78
End: 2018-09-21

## 2018-09-21 DIAGNOSIS — I10 ESSENTIAL HYPERTENSION: ICD-10-CM

## 2018-09-21 DIAGNOSIS — M85.80 OSTEOPENIA, UNSPECIFIED LOCATION: ICD-10-CM

## 2018-09-21 DIAGNOSIS — E78.2 MIXED HYPERLIPIDEMIA: ICD-10-CM

## 2018-09-21 DIAGNOSIS — E56.9 VITAMIN DEFICIENCY: ICD-10-CM

## 2018-09-21 LAB
25(OH)D3 SERPL-MCNC: 39 NG/ML
ALBUMIN SERPL-MCNC: 4.5 G/DL (ref 3.4–4.8)
ALBUMIN/GLOB SERPL: 1.6 G/DL (ref 1.5–2.5)
ALP SERPL-CCNC: 76 U/L (ref 35–104)
ALT SERPL W P-5'-P-CCNC: 23 U/L (ref 10–36)
ANION GAP SERPL CALCULATED.3IONS-SCNC: 5.7 MMOL/L (ref 3.6–11.2)
AST SERPL-CCNC: 30 U/L (ref 10–30)
BASOPHILS # BLD AUTO: 0.03 10*3/MM3 (ref 0–0.3)
BASOPHILS NFR BLD AUTO: 0.5 % (ref 0–2)
BILIRUB SERPL-MCNC: 0.5 MG/DL (ref 0.2–1.8)
BUN BLD-MCNC: 16 MG/DL (ref 7–21)
BUN/CREAT SERPL: 24.6 (ref 7–25)
CALCIUM SPEC-SCNC: 9.2 MG/DL (ref 7.7–10)
CHLORIDE SERPL-SCNC: 108 MMOL/L (ref 99–112)
CHOLEST SERPL-MCNC: 191 MG/DL (ref 0–200)
CO2 SERPL-SCNC: 29.3 MMOL/L (ref 24.3–31.9)
CREAT BLD-MCNC: 0.65 MG/DL (ref 0.43–1.29)
DEPRECATED RDW RBC AUTO: 43.4 FL (ref 37–54)
EOSINOPHIL # BLD AUTO: 0.23 10*3/MM3 (ref 0–0.7)
EOSINOPHIL NFR BLD AUTO: 3.9 % (ref 0–7)
ERYTHROCYTE [DISTWIDTH] IN BLOOD BY AUTOMATED COUNT: 13.6 % (ref 11.5–14.5)
GFR SERPL CREATININE-BSD FRML MDRD: 88 ML/MIN/1.73
GLOBULIN UR ELPH-MCNC: 2.8 GM/DL
GLUCOSE BLD-MCNC: 84 MG/DL (ref 70–110)
HCT VFR BLD AUTO: 40.7 % (ref 37–47)
HDLC SERPL-MCNC: 78 MG/DL (ref 60–100)
HGB BLD-MCNC: 13.6 G/DL (ref 12–16)
IMM GRANULOCYTES # BLD: 0 10*3/MM3 (ref 0–0.03)
IMM GRANULOCYTES NFR BLD: 0 % (ref 0–0.5)
LDLC SERPL CALC-MCNC: 95 MG/DL (ref 0–100)
LDLC/HDLC SERPL: 1.22 {RATIO}
LYMPHOCYTES # BLD AUTO: 1.78 10*3/MM3 (ref 1–3)
LYMPHOCYTES NFR BLD AUTO: 30.2 % (ref 16–46)
MCH RBC QN AUTO: 29.8 PG (ref 27–33)
MCHC RBC AUTO-ENTMCNC: 33.4 G/DL (ref 33–37)
MCV RBC AUTO: 89.1 FL (ref 80–94)
MONOCYTES # BLD AUTO: 0.41 10*3/MM3 (ref 0.1–0.9)
MONOCYTES NFR BLD AUTO: 7 % (ref 0–12)
NEUTROPHILS # BLD AUTO: 3.44 10*3/MM3 (ref 1.4–6.5)
NEUTROPHILS NFR BLD AUTO: 58.4 % (ref 40–75)
OSMOLALITY SERPL CALC.SUM OF ELEC: 285.4 MOSM/KG (ref 273–305)
PLATELET # BLD AUTO: 307 10*3/MM3 (ref 130–400)
PMV BLD AUTO: 9.3 FL (ref 6–10)
POTASSIUM BLD-SCNC: 3.9 MMOL/L (ref 3.5–5.3)
PROT SERPL-MCNC: 7.3 G/DL (ref 6–8)
RBC # BLD AUTO: 4.57 10*6/MM3 (ref 4.2–5.4)
SODIUM BLD-SCNC: 143 MMOL/L (ref 135–153)
TRIGL SERPL-MCNC: 88 MG/DL (ref 0–150)
TSH SERPL DL<=0.05 MIU/L-ACNC: 0.92 MIU/ML (ref 0.55–4.78)
VLDLC SERPL-MCNC: 17.6 MG/DL
WBC NRBC COR # BLD: 5.89 10*3/MM3 (ref 4.5–12.5)

## 2018-09-21 PROCEDURE — 82306 VITAMIN D 25 HYDROXY: CPT | Performed by: GENERAL PRACTICE

## 2018-09-21 PROCEDURE — 80061 LIPID PANEL: CPT | Performed by: GENERAL PRACTICE

## 2018-09-21 PROCEDURE — 84443 ASSAY THYROID STIM HORMONE: CPT | Performed by: GENERAL PRACTICE

## 2018-09-21 PROCEDURE — 85025 COMPLETE CBC W/AUTO DIFF WBC: CPT | Performed by: GENERAL PRACTICE

## 2018-09-21 PROCEDURE — 80053 COMPREHEN METABOLIC PANEL: CPT | Performed by: GENERAL PRACTICE

## 2018-09-21 PROCEDURE — 36415 COLL VENOUS BLD VENIPUNCTURE: CPT

## 2018-09-24 ENCOUNTER — OFFICE VISIT (OUTPATIENT)
Dept: FAMILY MEDICINE CLINIC | Facility: CLINIC | Age: 78
End: 2018-09-24

## 2018-09-24 VITALS
RESPIRATION RATE: 12 BRPM | TEMPERATURE: 98.2 F | HEIGHT: 66 IN | BODY MASS INDEX: 29.89 KG/M2 | WEIGHT: 186 LBS | OXYGEN SATURATION: 97 % | SYSTOLIC BLOOD PRESSURE: 155 MMHG | HEART RATE: 60 BPM | DIASTOLIC BLOOD PRESSURE: 80 MMHG

## 2018-09-24 DIAGNOSIS — Z23 ENCOUNTER FOR IMMUNIZATION: ICD-10-CM

## 2018-09-24 DIAGNOSIS — M85.80 OSTEOPENIA, UNSPECIFIED LOCATION: ICD-10-CM

## 2018-09-24 DIAGNOSIS — E78.2 MIXED HYPERLIPIDEMIA: ICD-10-CM

## 2018-09-24 DIAGNOSIS — Z87.39 HISTORY OF ACUTE GOUTY ARTHRITIS: ICD-10-CM

## 2018-09-24 DIAGNOSIS — Z00.00 HEALTHCARE MAINTENANCE: ICD-10-CM

## 2018-09-24 DIAGNOSIS — I10 ESSENTIAL HYPERTENSION: Primary | ICD-10-CM

## 2018-09-24 DIAGNOSIS — F43.21 GRIEF REACTION: ICD-10-CM

## 2018-09-24 DIAGNOSIS — J30.89 CHRONIC NON-SEASONAL ALLERGIC RHINITIS, UNSPECIFIED TRIGGER: ICD-10-CM

## 2018-09-24 DIAGNOSIS — E56.9 VITAMIN DEFICIENCY: ICD-10-CM

## 2018-09-24 PROBLEM — F43.20 GRIEF REACTION: Status: ACTIVE | Noted: 2018-09-24

## 2018-09-24 PROCEDURE — 99214 OFFICE O/P EST MOD 30 MIN: CPT | Performed by: GENERAL PRACTICE

## 2018-09-24 PROCEDURE — 90686 IIV4 VACC NO PRSV 0.5 ML IM: CPT | Performed by: GENERAL PRACTICE

## 2018-09-24 PROCEDURE — G0008 ADMIN INFLUENZA VIRUS VAC: HCPCS | Performed by: GENERAL PRACTICE

## 2018-09-24 NOTE — PROGRESS NOTES
Subjective   Rylee Hull is a 78 y.o. female.     History of Present Illness     Depression  Since last here her son  of apparent suicide.  This has been difficult and she admits to a depressed mood, difficulty sleeping, and fatigue.  There has been no change in her appetite and she denies any suicidal ideation.    Hypertension  Home blood pressure readings: not doing. Associated signs and symptoms: none. Patient denies: chest pain, palpitations, dyspnea, orthopnea, paroxysmal nocturnal dyspnea and peripheral edema. Current antihypertensive medications includes bisoprolol and losartan. Medication compliance: taking as prescribed.   Lab Results   Component Value Date    CREATININE 0.65 2018     Dyslipidemia  Compliance with treatment has been excellent. The patient exercises daily. She is currently being prescribed the following medication for her dyslipidemia - lifestyle modifcation.    Lab Results   Component Value Date    CHOL 191 2018    CHLPL 204 (H) 2015    TRIG 88 2018    HDL 78 2018    LDL 95 2018     Gouty Arthritis  She denies any further pain, swelling, or redness about the base of her right great toe.  There have been no new associated symptoms.  She has a family history of gout.  She was taken off hydrochlorothiazide at the time her last labs were drawn  Lab Results   Component Value Date    URICACID 7.8 (H) 2018     Labs  Most recent vitamin D 39    The following portions of the patient's history were reviewed and updated as appropriate: allergies, current medications, past medical history, past social history and problem list.    Review of Systems   Constitutional: Positive for fatigue. Negative for appetite change, chills, fever and unexpected weight change.   HENT: Positive for congestion, rhinorrhea and sneezing. Negative for ear pain, sore throat and voice change.    Eyes: Negative for visual disturbance.   Respiratory: Negative for cough,  shortness of breath and wheezing.    Cardiovascular: Negative for chest pain, palpitations and leg swelling.   Gastrointestinal: Negative for abdominal pain, blood in stool, constipation, diarrhea, nausea and vomiting.   Endocrine: Negative for polydipsia.   Genitourinary: Negative for difficulty urinating, dysuria, frequency, hematuria, menstrual problem, pelvic pain, urgency, vaginal bleeding and vaginal discharge.   Musculoskeletal: Negative for arthralgias, back pain, joint swelling, myalgias and neck pain.   Skin: Negative for color change.        Lesion right index finger   Neurological: Negative for tremors, weakness, numbness and headaches.   Psychiatric/Behavioral: Positive for dysphoric mood and sleep disturbance. Negative for suicidal ideas. The patient is not nervous/anxious.      Objective   Physical Exam   Constitutional: She is oriented to person, place, and time. No distress.   Appeared younger then stated age. Teary when discussing her son's death however also smiled when appropriate.  No apparent distress. No pallor, jaundice, diaphoresis, or cyanosis.     HENT:   Head: Atraumatic.   Right Ear: Tympanic membrane, external ear and ear canal normal.   Left Ear: Tympanic membrane, external ear and ear canal normal.   Nose: Nose normal.   Mouth/Throat: Oropharynx is clear and moist. Mucous membranes are not pale and not cyanotic.   Eyes: Pupils are equal, round, and reactive to light. EOM are normal. No scleral icterus.   Neck: No JVD present. Carotid bruit is not present. No tracheal deviation present. No thyromegaly present.   Cardiovascular: Normal rate, regular rhythm, S1 normal, S2 normal and intact distal pulses.  Exam reveals no gallop, no S3 and no S4.    No murmur heard.  Pulmonary/Chest: Breath sounds normal. No stridor. No respiratory distress.   Abdominal: Soft. Normal aorta and bowel sounds are normal. She exhibits no distension, no abdominal bruit and no mass. There is no  hepatosplenomegaly. There is no tenderness. No hernia.   Musculoskeletal: She exhibits no tenderness or deformity.     Vascular Status -  Her right foot exhibits no edema. Her left foot exhibits no edema.  Lymphadenopathy:        Head (right side): No submandibular adenopathy present.        Head (left side): No submandibular adenopathy present.     She has no cervical adenopathy.   Neurological: She is alert and oriented to person, place, and time. She has normal reflexes. She displays normal reflexes. No cranial nerve deficit. She exhibits normal muscle tone. Coordination normal.   Skin: Skin is warm and dry. No rash noted. She is not diaphoretic. No cyanosis. No pallor. Nails show no clubbing.   Psychiatric: Her speech is normal and behavior is normal. Thought content normal. She exhibits a depressed mood (when discussing her son's death). She expresses no suicidal ideation.     Assessment/Plan   Problems Addressed this Visit        Cardiovascular and Mediastinum    Mixed hyperlipidemia  Encouraged to continue to work on her diet and exercise plan.    Essential hypertension    Hypertension: elevated today. Evidence of target organ damage: none.   As above  Continue current medication for now.  If blood pressure remains elevated at her return we will titrate losartan       Respiratory    Chronic non-seasonal allergic rhinitis       Digestive    Vitamin deficiency       Musculoskeletal and Integument    Osteopenia       Other    Healthcare maintenance  Recommended a flu shot  Encouraged to follow up with shingrix.    Relevant Medications    Zoster Vac Recomb Adjuvanted (SHINGRIX) 50 MCG reconstituted suspension    Other Relevant Orders    Fluarix/Fluzone/Afluria Quad>6 Months (Completed)    Encounter for immunization    Relevant Orders    Fluarix/Fluzone/Afluria Quad>6 Months (Completed)    History of acute gouty arthritis  Currently in remission off hydrochlorothiazide and on losartan  Encouraged to report  immediately if any recurrence of her symptoms     Grief reaction  Uncomplicated  Supportive therapy  Encouraged to report if any worse or if any new symptoms or concerns.

## 2019-01-28 ENCOUNTER — OFFICE VISIT (OUTPATIENT)
Dept: FAMILY MEDICINE CLINIC | Facility: CLINIC | Age: 79
End: 2019-01-28

## 2019-01-28 VITALS
DIASTOLIC BLOOD PRESSURE: 75 MMHG | TEMPERATURE: 98.2 F | HEART RATE: 92 BPM | RESPIRATION RATE: 12 BRPM | HEIGHT: 66 IN | BODY MASS INDEX: 30.22 KG/M2 | OXYGEN SATURATION: 96 % | SYSTOLIC BLOOD PRESSURE: 160 MMHG | WEIGHT: 188 LBS

## 2019-01-28 DIAGNOSIS — J30.9 CHRONIC ALLERGIC RHINITIS: ICD-10-CM

## 2019-01-28 DIAGNOSIS — M85.80 OSTEOPENIA, UNSPECIFIED LOCATION: ICD-10-CM

## 2019-01-28 DIAGNOSIS — I10 ESSENTIAL HYPERTENSION: ICD-10-CM

## 2019-01-28 DIAGNOSIS — L98.9 SKIN LESION: ICD-10-CM

## 2019-01-28 DIAGNOSIS — Z87.39 HISTORY OF ACUTE GOUTY ARTHRITIS: ICD-10-CM

## 2019-01-28 DIAGNOSIS — N39.41 URGE URINARY INCONTINENCE: ICD-10-CM

## 2019-01-28 DIAGNOSIS — F43.9 STRESS: ICD-10-CM

## 2019-01-28 DIAGNOSIS — Z00.00 HEALTHCARE MAINTENANCE: ICD-10-CM

## 2019-01-28 DIAGNOSIS — E56.9 VITAMIN DEFICIENCY: ICD-10-CM

## 2019-01-28 DIAGNOSIS — E78.2 MIXED HYPERLIPIDEMIA: Primary | ICD-10-CM

## 2019-01-28 PROBLEM — F43.20 GRIEF REACTION: Status: RESOLVED | Noted: 2018-09-24 | Resolved: 2019-01-28

## 2019-01-28 PROBLEM — F43.21 GRIEF REACTION: Status: RESOLVED | Noted: 2018-09-24 | Resolved: 2019-01-28

## 2019-01-28 LAB
BACTERIA UR QL AUTO: ABNORMAL /HPF
BILIRUB BLD-MCNC: NEGATIVE MG/DL
CLARITY, POC: CLEAR
COLOR UR: YELLOW
GLUCOSE UR STRIP-MCNC: NEGATIVE MG/DL
HYALINE CASTS UR QL AUTO: ABNORMAL /LPF
KETONES UR QL: NEGATIVE
LEUKOCYTE EST, POC: NEGATIVE
NITRITE UR-MCNC: NEGATIVE MG/ML
PH UR: 7 [PH] (ref 5–8)
PROT UR STRIP-MCNC: NEGATIVE MG/DL
RBC # UR STRIP: ABNORMAL /UL
RBC # UR: ABNORMAL /HPF
REF LAB TEST METHOD: ABNORMAL
SP GR UR: 1.01 (ref 1–1.03)
SQUAMOUS #/AREA URNS HPF: ABNORMAL /HPF
UROBILINOGEN UR QL: NORMAL
WBC UR QL AUTO: ABNORMAL /HPF

## 2019-01-28 PROCEDURE — 81003 URINALYSIS AUTO W/O SCOPE: CPT | Performed by: GENERAL PRACTICE

## 2019-01-28 PROCEDURE — 99214 OFFICE O/P EST MOD 30 MIN: CPT | Performed by: GENERAL PRACTICE

## 2019-01-28 PROCEDURE — 81015 MICROSCOPIC EXAM OF URINE: CPT | Performed by: GENERAL PRACTICE

## 2019-01-28 RX ORDER — TOLTERODINE 2 MG/1
2 CAPSULE, EXTENDED RELEASE ORAL EVERY MORNING
Qty: 30 CAPSULE | Refills: 5 | Status: SHIPPED | OUTPATIENT
Start: 2019-01-28 | End: 2019-02-04

## 2019-01-28 RX ORDER — LOSARTAN POTASSIUM 50 MG/1
50 TABLET ORAL EVERY MORNING
Qty: 90 TABLET | Refills: 3 | Status: SHIPPED | OUTPATIENT
Start: 2019-01-28 | End: 2020-04-06 | Stop reason: SDUPTHER

## 2019-01-28 RX ORDER — FLUTICASONE PROPIONATE 50 MCG
SPRAY, SUSPENSION (ML) NASAL
Qty: 3 BOTTLE | Refills: 3 | Status: SHIPPED | OUTPATIENT
Start: 2019-01-28 | End: 2020-03-18

## 2019-01-28 RX ORDER — COLCHICINE 0.6 MG/1
0.6 TABLET ORAL DAILY
Qty: 30 TABLET | Refills: 0 | Status: SHIPPED | OUTPATIENT
Start: 2019-01-28

## 2019-01-28 RX ORDER — BISOPROLOL FUMARATE 10 MG/1
10 TABLET, FILM COATED ORAL NIGHTLY
Qty: 90 TABLET | Refills: 3 | Status: SHIPPED | OUTPATIENT
Start: 2019-01-28 | End: 2020-03-23

## 2019-01-28 RX ORDER — CETIRIZINE HYDROCHLORIDE 10 MG/1
10 TABLET ORAL DAILY
Qty: 90 TABLET | Refills: 3 | Status: SHIPPED | OUTPATIENT
Start: 2019-01-28

## 2019-01-28 NOTE — PROGRESS NOTES
Subjective   Rylee Hull is a 78 y.o. female.     History of Present Illness     Urinary Incontinence  Returns with a more then five year history of urinary frequency, nocturia x 1-2, urgency and occasional incontinence if she does not get to the bathroom quick enough. She denies any dysuria, hematuria, or incontinence with coughing, sneezing, lifting, pushing or pulling. She denies any fecal incontinence.     Hypertension  Home blood pressure readings: not doing. Associated signs and symptoms: none. Patient denies: chest pain, palpitations, dyspnea, orthopnea, paroxysmal nocturnal dyspnea and peripheral edema. Current antihypertensive medications includes bisoprolol and losartan. Medication compliance: taking as prescribed.     Dyslipidemia  Compliance with treatment has been excellent. The patient exercises daily. She is currently being prescribed the following medication for her dyslipidemia - lifestyle modifcation. She has had no recent labs    Gouty Arthritis  She denies any attacks since last here and has not required any colchicine.  She has a family history of gout.      Depression  Last year her son  of apparent suicide. 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 She admits to intermittent depression, nervousness, difficulty sleeping, and fatigue. She sleeps well if at home but is at her mother's most nights. There has been no change in her appetite and she denies any suicidal ideation.    The following portions of the patient's history were reviewed and updated as appropriate: allergies, current medications, past medical history, past social history and problem list.    Review of Systems   Constitutional: Positive for fatigue. Negative for appetite change, chills, fever and unexpected weight change.   HENT: Negative for congestion, ear pain, rhinorrhea, sneezing, sore throat and voice change.    Eyes: Negative for visual disturbance.   Respiratory: Negative for  cough, shortness of breath and wheezing.    Cardiovascular: Negative for chest pain, palpitations and leg swelling.   Gastrointestinal: Negative for abdominal pain, blood in stool, constipation, diarrhea, nausea and vomiting.   Endocrine: Negative for polydipsia.   Genitourinary: Positive for frequency and urgency. Negative for difficulty urinating, dysuria, hematuria, menstrual problem, pelvic pain, vaginal bleeding and vaginal discharge.   Musculoskeletal: Negative for arthralgias, back pain, joint swelling, myalgias and neck pain.   Skin: Negative for color change.        Lesion right index finger   Neurological: Negative for tremors, weakness, numbness and headaches.   Psychiatric/Behavioral: Positive for dysphoric mood and sleep disturbance. Negative for suicidal ideas. The patient is nervous/anxious.      Objective   Physical Exam   Constitutional: She is oriented to person, place, and time. No distress.   Appeared younger then stated age. Alert and in fair spirits. No apparent distress. No pallor, jaundice, diaphoresis, or cyanosis.     HENT:   Head: Atraumatic.   Right Ear: Tympanic membrane, external ear and ear canal normal.   Left Ear: Tympanic membrane, external ear and ear canal normal.   Nose: Nose normal.   Mouth/Throat: Oropharynx is clear and moist. Mucous membranes are not pale and not cyanotic.   Eyes: EOM are normal. Pupils are equal, round, and reactive to light. No scleral icterus.   Neck: No JVD present. Carotid bruit is not present. No tracheal deviation present. No thyromegaly present.   Cardiovascular: Normal rate, regular rhythm, S1 normal, S2 normal and intact distal pulses. Exam reveals no gallop, no S3 and no S4.   No murmur heard.  Pulmonary/Chest: Breath sounds normal. No stridor. No respiratory distress.   Abdominal: Soft. Normal aorta and bowel sounds are normal. She exhibits no distension, no abdominal bruit and no mass. There is no hepatosplenomegaly. There is no tenderness. No  hernia.   Musculoskeletal: She exhibits no tenderness or deformity.     Vascular Status -  Her right foot exhibits no edema. Her left foot exhibits no edema.  Lymphadenopathy:        Head (right side): No submandibular adenopathy present.        Head (left side): No submandibular adenopathy present.     She has no cervical adenopathy.   Neurological: She is alert and oriented to person, place, and time. She has normal reflexes. She displays normal reflexes. No cranial nerve deficit. She exhibits normal muscle tone. Coordination normal.   Skin: Skin is warm and dry. No rash noted. Lesion: 1 cm slightly irregular well demarcated slightly scaly erythematous macule along the right medial jawline - appears unchanged. She is not diaphoretic. No cyanosis. No pallor. Nails show no clubbing.   Psychiatric: She has a normal mood and affect.     Assessment/Plan   Problems Addressed this Visit        Cardiovascular and Mediastinum    Mixed hyperlipidemia   Encouraged to continue to work on her diet and exercise plan.  Fasting labs will be scheduled at her return    Essential hypertension   Hypertension: elevated today. Evidence of target organ damage: none.   As above  Continue current medication for now. If elevated at her return will consider adjusting her antihypertensive regimen    Relevant Medications    bisoprolol (ZEBeta) 10 MG tablet    losartan (COZAAR) 50 MG tablet       Respiratory    Chronic allergic rhinitis  Doing well at present  Reminded regarding allergen avoidance.  Continue current medication      Relevant Medications    cetirizine (zyrTEC) 10 MG tablet    fluticasone (FLONASE) 50 MCG/ACT nasal spray       Musculoskeletal and Integument   Osteopenia   Skin lesion  Stable  Encouraged to report if this should change.      Genitourinary   Urge urinary incontinence  Reviewed treatment options and agreed on a trial of an anticholinergic. Advised of the potential side effects  Encouraged to report if any worse or  if any new symptoms or concerns.   Relevant Medications   tolterodine LA (DETROL LA) 2 MG 24 hr capsule   Other Relevant Orders   POC Urinalysis Dipstick, Automated (Completed)      Other   Healthcare maintenance  Reviewed the potential benefits and risks of hepatitis A immunizations. Patient does not eat out but will consider   History of acute gouty arthritis   Relevant Medications   colchicine 0.6 MG tablet   losartan (COZAAR) 50 MG tablet   Stress  Significant situational component.   Supportive therapy.   Encouraged to report if any worse or if any new symptoms or concerns.

## 2019-02-04 ENCOUNTER — TELEPHONE (OUTPATIENT)
Dept: FAMILY MEDICINE CLINIC | Facility: CLINIC | Age: 79
End: 2019-02-04

## 2019-02-04 RX ORDER — FESOTERODINE FUMARATE 4 MG/1
4 TABLET, EXTENDED RELEASE ORAL
Qty: 30 TABLET | Refills: 5 | Status: SHIPPED | OUTPATIENT
Start: 2019-02-04 | End: 2019-03-25

## 2019-02-04 NOTE — TELEPHONE ENCOUNTER
Left message for patient with this information.   ----- Message from Jorge Boss MD sent at 2/4/2019 12:07 PM EST -----  Script sent for toviaz - thanks    ----- Message -----  From: Nallely Johnson MA  Sent: 2/4/2019  10:00 AM  To: Jorge Boss MD    The patient ABHIJIT LA is not covered on her insurance. Her insurance is requesting one of these Oxybutynin, Vesicare, toviaz, and myrbetriq.

## 2019-03-25 ENCOUNTER — OFFICE VISIT (OUTPATIENT)
Dept: FAMILY MEDICINE CLINIC | Facility: CLINIC | Age: 79
End: 2019-03-25

## 2019-03-25 VITALS
HEART RATE: 86 BPM | HEIGHT: 66 IN | OXYGEN SATURATION: 97 % | SYSTOLIC BLOOD PRESSURE: 130 MMHG | TEMPERATURE: 98.2 F | RESPIRATION RATE: 12 BRPM | BODY MASS INDEX: 29.89 KG/M2 | DIASTOLIC BLOOD PRESSURE: 70 MMHG | WEIGHT: 186 LBS

## 2019-03-25 DIAGNOSIS — I10 ESSENTIAL HYPERTENSION: ICD-10-CM

## 2019-03-25 DIAGNOSIS — E78.2 MIXED HYPERLIPIDEMIA: Primary | ICD-10-CM

## 2019-03-25 DIAGNOSIS — Z00.00 HEALTHCARE MAINTENANCE: ICD-10-CM

## 2019-03-25 DIAGNOSIS — J30.9 CHRONIC ALLERGIC RHINITIS: ICD-10-CM

## 2019-03-25 DIAGNOSIS — N39.41 URGE URINARY INCONTINENCE: ICD-10-CM

## 2019-03-25 DIAGNOSIS — M85.80 OSTEOPENIA, UNSPECIFIED LOCATION: ICD-10-CM

## 2019-03-25 DIAGNOSIS — Z87.39 HISTORY OF ACUTE GOUTY ARTHRITIS: ICD-10-CM

## 2019-03-25 DIAGNOSIS — Z12.31 ENCOUNTER FOR SCREENING MAMMOGRAM FOR BREAST CANCER: ICD-10-CM

## 2019-03-25 PROCEDURE — G0439 PPPS, SUBSEQ VISIT: HCPCS | Performed by: GENERAL PRACTICE

## 2019-03-25 RX ORDER — FESOTERODINE FUMARATE 8 MG/1
8 TABLET, EXTENDED RELEASE ORAL
Qty: 30 TABLET | Refills: 5 | Status: SHIPPED | OUTPATIENT
Start: 2019-03-25 | End: 2020-01-03 | Stop reason: SDUPTHER

## 2019-03-25 NOTE — PROGRESS NOTES
QUICK REFERENCE INFORMATION:  The ABCs of the Annual Wellness Visit    Subsequent Medicare Wellness Visit    HEALTH RISK ASSESSMENT    1940    Recent Hospitalizations:  No hospitalization(s) within the last year..        Current Medical Providers:  Patient Care Team:  Jorge Boss MD as PCP - General (Family Medicine)        Smoking Status:  Social History     Tobacco Use   Smoking Status Never Smoker   Smokeless Tobacco Never Used       Alcohol Consumption:  Social History     Substance and Sexual Activity   Alcohol Use No       Depression Screen:   PHQ-2/PHQ-9 Depression Screening 3/25/2019   Little interest or pleasure in doing things 0   Feeling down, depressed, or hopeless 0   Trouble falling or staying asleep, or sleeping too much 0   Feeling tired or having little energy 0   Poor appetite or overeating 0   Feeling bad about yourself - or that you are a failure or have let yourself or your family down 0   Trouble concentrating on things, such as reading the newspaper or watching television 0   Moving or speaking so slowly that other people could have noticed. Or the opposite - being so fidgety or restless that you have been moving around a lot more than usual 0   Thoughts that you would be better off dead, or of hurting yourself in some way 0   Total Score 0   If you checked off any problems, how difficult have these problems made it for you to do your work, take care of things at home, or get along with other people? Not difficult at all       Health Habits and Functional and Cognitive Screening:  Functional & Cognitive Status 3/25/2019   Do you have difficulty preparing food and eating? No   Do you have difficulty bathing yourself, getting dressed or grooming yourself? No   Do you have difficulty using the toilet? No   Do you have difficulty moving around from place to place? No   Do you have trouble with steps or getting out of a bed or a chair? No   In the past year have you fallen or  experienced a near fall? No   Current Diet Well Balanced Diet   Dental Exam Not up to date   Eye Exam Not up to date   Exercise (times per week) 7 times per week   Current Exercise Activities Include Housecleaning   Do you need help using the phone?  No   Are you deaf or do you have serious difficulty hearing?  No   Do you need help with transportation? No   Do you need help shopping? No   Do you need help preparing meals?  No   Do you need help with housework?  No   Do you need help with laundry? No   Do you need help taking your medications? No   Do you need help managing money? No   Do you ever drive or ride in a car without wearing a seat belt? No   Have you felt unusual stress, anger or loneliness in the last month? No   Who do you live with? Other   If you need help, do you have trouble finding someone available to you? -   Have you been bothered in the last four weeks by sexual problems? No   Do you have difficulty concentrating, remembering or making decisions? No           Does the patient have evidence of cognitive impairment? No    Aspirin use counseling: Does not need ASA (and currently is not on it)      Recent Lab Results:  CMP:  Lab Results   Component Value Date    BUN 16 09/21/2018    CREATININE 0.65 09/21/2018    EGFRIFNONA 88 09/21/2018    EGFRIFAFRI  09/07/2016      Comment:      <15 Indicative of kidney failure.    BCR 24.6 09/21/2018     09/21/2018    K 3.9 09/21/2018    CO2 29.3 09/21/2018    CALCIUM 9.2 09/21/2018    ALBUMIN 4.50 09/21/2018    LABIL2 1.6 09/01/2015    BILITOT 0.5 09/21/2018    ALKPHOS 76 09/21/2018    AST 30 09/21/2018    ALT 23 09/21/2018     Lipid Panel:  Lab Results   Component Value Date    CHOL 191 09/21/2018    TRIG 88 09/21/2018    HDL 78 09/21/2018    VLDL 17.6 09/21/2018    LDLHDL 1.22 09/21/2018     HbA1c:       Visual Acuity:   Visual Acuity Screening    Right eye Left eye Both eyes   Without correction:      With correction: 20/25 20/20 20/25        Age-appropriate Screening Schedule:  Refer to the list below for future screening recommendations based on patient's age, sex and/or medical conditions. Orders for these recommended tests are listed in the plan section. The patient has been provided with a written plan.    Health Maintenance   Topic Date Due   • TDAP/TD VACCINES (1 - Tdap) 03/16/1960   • ZOSTER VACCINE (2 of 3) 02/26/2010   • LIPID PANEL  09/21/2019   • MAMMOGRAM  05/23/2020   • DXA SCAN  05/23/2020   • PAP SMEAR  03/22/2023   • COLONOSCOPY  04/21/2024   • INFLUENZA VACCINE  Completed   • PNEUMOCOCCAL VACCINES (65+ LOW/MEDIUM RISK)  Completed        Subjective   History of Present Illness    Rylee Hull is a 79 y.o. female who presents for an Subsequent Wellness Visit.    The following portions of the patient's history were reviewed and updated as appropriate: allergies, current medications, past family history, past medical history, past social history, past surgical history and problem list.    Outpatient Medications Prior to Visit   Medication Sig Dispense Refill   • bisoprolol (ZEBeta) 10 MG tablet Take 1 tablet by mouth Every Night. 90 tablet 3   • cetirizine (zyrTEC) 10 MG tablet Take 1 tablet by mouth Daily. 90 tablet 3   • colchicine 0.6 MG tablet Take 1 tablet by mouth Daily. 2 tabs now then 1 tab in 2 hours 30 tablet 0   • fluticasone (FLONASE) 50 MCG/ACT nasal spray Administer 2 sprays both nostrils once daily 3 bottle 3   • losartan (COZAAR) 50 MG tablet Take 1 tablet by mouth Every Morning. 90 tablet 3   • Zoster Vac Recomb Adjuvanted (SHINGRIX) 50 MCG reconstituted suspension Inject 50 mcg into the appropriate muscle as directed by prescriber See Admin Instructions. Repeat in 2-6 months 1 each 1   • fesoterodine fumarate (TOVIAZ) 4 MG tablet sustained-release 24 hour tablet Take 1 tablet by mouth Daily. 30 tablet 5     No facility-administered medications prior to visit.        Patient Active Problem List   Diagnosis   •  Osteopenia   • Mixed hyperlipidemia   • Essential hypertension   • Chronic allergic rhinitis   • Encounter for screening mammogram for breast cancer   • Healthcare maintenance   • Encounter for immunization   • Skin lesion   • History of acute gouty arthritis   • Urge urinary incontinence   • Stress       Advance Care Planning:  has NO advance directive - information provided to the patient today    Identification of Risk Factors:  Risk factors include: weight  and caretaker stress.    Review of Systems   Constitutional: Positive for fatigue. Negative for appetite change, chills, fever and unexpected weight change.   HENT: Positive for congestion, rhinorrhea and sneezing. Negative for ear pain, sore throat and voice change.    Eyes: Negative for visual disturbance.   Respiratory: Negative for cough, shortness of breath and wheezing.    Cardiovascular: Negative for chest pain, palpitations and leg swelling.   Gastrointestinal: Negative for abdominal pain, blood in stool, constipation, diarrhea, nausea and vomiting.   Endocrine: Negative for polydipsia.   Genitourinary: Negative for difficulty urinating, dysuria, frequency, hematuria, menstrual problem, pelvic pain, urgency, vaginal bleeding and vaginal discharge.   Musculoskeletal: Negative for arthralgias, back pain, joint swelling, myalgias and neck pain.   Skin: Negative for color change.        Lesion right index finger   Neurological: Negative for tremors, weakness, numbness and headaches.   Psychiatric/Behavioral: Positive for dysphoric mood and sleep disturbance. Negative for suicidal ideas. The patient is not nervous/anxious.      Compared to one year ago, the patient feels her physical health is the same.  Compared to one year ago, the patient feels her mental health is worse.    Objective     Physical Exam   Constitutional: She is oriented to person, place, and time. No distress.   Appeared younger then stated age. Alert and in fair spirits. No apparent  distress. No pallor, jaundice, diaphoresis, or cyanosis.     HENT:   Head: Atraumatic.   Right Ear: Tympanic membrane, external ear and ear canal normal.   Left Ear: Tympanic membrane, external ear and ear canal normal.   Nose: Nose normal.   Mouth/Throat: Oropharynx is clear and moist. Mucous membranes are not pale and not cyanotic.   Eyes: EOM are normal. Pupils are equal, round, and reactive to light. No scleral icterus.   Neck: No JVD present. Carotid bruit is not present. No tracheal deviation present. No thyromegaly present.   Cardiovascular: Normal rate, regular rhythm, S1 normal, S2 normal and intact distal pulses. Exam reveals no gallop, no S3 and no S4.   No murmur heard.  Pulmonary/Chest: Breath sounds normal. No stridor. No respiratory distress.   Abdominal: Soft. Normal aorta and bowel sounds are normal. She exhibits no distension, no abdominal bruit and no mass. There is no hepatosplenomegaly. There is no tenderness. No hernia.   Musculoskeletal: She exhibits no tenderness or deformity.     Vascular Status -  Her right foot exhibits no edema. Her left foot exhibits no edema.  Lymphadenopathy:        Head (right side): No submandibular adenopathy present.        Head (left side): No submandibular adenopathy present.     She has no cervical adenopathy.   Neurological: She is alert and oriented to person, place, and time. She has normal reflexes. She displays normal reflexes. No cranial nerve deficit. She exhibits normal muscle tone. Coordination normal.   Skin: Skin is warm and dry. No rash noted. Lesion: 1 cm slightly irregular well demarcated slightly scaly erythematous macule along the right medial jawline - appears unchanged. She is not diaphoretic. No cyanosis. No pallor. Nails show no clubbing.   Psychiatric: She has a normal mood and affect.       Vitals:    03/25/19 1400   BP: 130/70   Pulse: 86   Resp: 12   Temp: 98.2 °F (36.8 °C)   TempSrc: Temporal   SpO2: 97%   Weight: 84.4 kg (186 lb)  "  Height: 167.6 cm (66\")       Patient's Body mass index is 30.02 kg/m². BMI is above normal parameters. Recommendations include: exercise counseling and nutrition counseling.      Assessment/Plan   Patient Self-Management and Personalized Health Advice  The patient has been provided with information about: diet, exercise, weight management, designing advance directives and mental health concerns and preventive services including:   · Screening mammography- referral placed, TdaP vaccine- prescription written.    Visit Diagnoses:    ICD-10-CM ICD-9-CM   1. Mixed hyperlipidemia E78.2 272.2   2. Essential hypertension I10 401.9   3. Chronic allergic rhinitis J30.9 477.9   4. Osteopenia, unspecified location M85.80 733.90   5. Urge urinary incontinence N39.41 788.31   6. Healthcare maintenance Z00.00 V70.0   7. History of acute gouty arthritis Z87.39 V13.4   8. Encounter for screening mammogram for breast cancer Z12.31 V76.12       Orders Placed This Encounter   Procedures   • Mammo Screening Digital Tomosynthesis Bilateral With CAD     Standing Status:   Future     Standing Expiration Date:   3/24/2020     Scheduling Instructions:      Please schedule for after 5/23/19     Order Specific Question:   Reason for Exam:     Answer:   screening       Outpatient Encounter Medications as of 3/25/2019   Medication Sig Dispense Refill   • bisoprolol (ZEBeta) 10 MG tablet Take 1 tablet by mouth Every Night. 90 tablet 3   • cetirizine (zyrTEC) 10 MG tablet Take 1 tablet by mouth Daily. 90 tablet 3   • colchicine 0.6 MG tablet Take 1 tablet by mouth Daily. 2 tabs now then 1 tab in 2 hours 30 tablet 0   • fesoterodine fumarate (TOVIAZ ER) 8 MG tablet sustained-release 24 hour tablet Take 1 tablet by mouth Daily. 30 tablet 5   • fluticasone (FLONASE) 50 MCG/ACT nasal spray Administer 2 sprays both nostrils once daily 3 bottle 3   • losartan (COZAAR) 50 MG tablet Take 1 tablet by mouth Every Morning. 90 tablet 3   • Tdap (BOOSTRIX) " 5-2.5-18.5 LF-MCG/0.5 injection Inject 0.5 mL into the appropriate muscle as directed by prescriber 1 (One) Time for 1 dose. 0.5 mL 0   • Zoster Vac Recomb Adjuvanted (SHINGRIX) 50 MCG reconstituted suspension Inject 50 mcg into the appropriate muscle as directed by prescriber See Admin Instructions. Repeat in 2-6 months 1 each 1   • [DISCONTINUED] fesoterodine fumarate (TOVIAZ) 4 MG tablet sustained-release 24 hour tablet Take 1 tablet by mouth Daily. 30 tablet 5   • [DISCONTINUED] Tdap (BOOSTRIX) 5-2.5-18.5 LF-MCG/0.5 injection Inject 0.5 mL into the appropriate muscle as directed by prescriber 1 (One) Time for 1 dose. 0.5 mL 0     No facility-administered encounter medications on file as of 3/25/2019.        Reviewed use of high risk medication in the elderly: yes  Reviewed for potential of harmful drug interactions in the elderly: yes    Follow Up:  Return in about 4 months (around 7/25/2019).     An After Visit Summary and PPPS with all of these plans were given to the patient.

## 2019-03-25 NOTE — PATIENT INSTRUCTIONS
Advance Directive  Advance directives are legal documents that let you make choices ahead of time about your health care and medical treatment in case you become unable to communicate for yourself. Advance directives are a way for you to communicate your wishes to family, friends, and health care providers. This can help convey your decisions about end-of-life care if you become unable to communicate.  Discussing and writing advance directives should happen over time rather than all at once. Advance directives can be changed depending on your situation and what you want, even after you have signed the advance directives.  If you do not have an advance directive, some states assign family decision makers to act on your behalf based on how closely you are related to them. Each state has its own laws regarding advance directives. You may want to check with your health care provider, , or state representative about the laws in your state. There are different types of advance directives, such as:  · Medical power of .  · Living will.  · Do not resuscitate (DNR) or do not attempt resuscitation (DNAR) order.    Health care proxy and medical power of   A health care proxy, also called a health care agent, is a person who is appointed to make medical decisions for you in cases in which you are unable to make the decisions yourself. Generally, people choose someone they know well and trust to represent their preferences. Make sure to ask this person for an agreement to act as your proxy. A proxy may have to exercise judgment in the event of a medical decision for which your wishes are not known.  A medical power of  is a legal document that names your health care proxy. Depending on the laws in your state, after the document is written, it may also need to be:  · Signed.  · Notarized.  · Dated.  · Copied.  · Witnessed.  · Incorporated into your medical record.    You may also want to appoint  someone to manage your financial affairs in a situation in which you are unable to do so. This is called a durable power of  for finances. It is a separate legal document from the durable power of  for health care. You may choose the same person or someone different from your health care proxy to act as your agent in financial matters.  If you do not appoint a proxy, or if there is a concern that the proxy is not acting in your best interests, a court-appointed guardian may be designated to act on your behalf.  Living will  A living will is a set of instructions documenting your wishes about medical care when you cannot express them yourself. Health care providers should keep a copy of your living will in your medical record. You may want to give a copy to family members or friends. To alert caregivers in case of an emergency, you can place a card in your wallet to let them know that you have a living will and where they can find it. A living will is used if you become:  · Terminally ill.  · Incapacitated.  · Unable to communicate or make decisions.    Items to consider in your living will include:  · The use or non-use of life-sustaining equipment, such as dialysis machines and breathing machines (ventilators).  · A DNR or DNAR order, which is the instruction not to use cardiopulmonary resuscitation (CPR) if breathing or heartbeat stops.  · The use or non-use of tube feeding.  · Withholding of food and fluids.  · Comfort (palliative) care when the goal becomes comfort rather than a cure.  · Organ and tissue donation.    A living will does not give instructions for distributing your money and property if you should pass away. It is recommended that you seek the advice of a  when writing a will. Decisions about taxes, beneficiaries, and asset distribution will be legally binding. This process can relieve your family and friends of any concerns surrounding disputes or questions that may come up  about the distribution of your assets.  DNR or DNAR  A DNR or DNAR order is a request not to have CPR in the event that your heart stops beating or you stop breathing. If a DNR or DNAR order has not been made and shared, a health care provider will try to help any patient whose heart has stopped or who has stopped breathing. If you plan to have surgery, talk with your health care provider about how your DNR or DNAR order will be followed if problems occur.  Summary  · Advance directives are the legal documents that allow you to make choices ahead of time about your health care and medical treatment in case you become unable to communicate for yourself.  · The process of discussing and writing advance directives should happen over time. You can change the advance directives, even after you have signed them.  · Advance directives include DNR or DNAR orders, living crow, and designating an agent as your medical power of .  This information is not intended to replace advice given to you by your health care provider. Make sure you discuss any questions you have with your health care provider.  Document Released: 03/26/2009 Document Revised: 11/06/2017 Document Reviewed: 11/06/2017  [employer] Interactive Patient Education © 2019 [employer]      Heart Disease Prevention  Heart disease is a leading cause of death. There are many things you can do to help prevent heart disease.  Be physically active  Physical activity is good for your heart. It helps control your blood pressure, cholesterol levels, and weight. Try to be physically active every day. Ask your health care provider what activities are best for you.  Be a healthy weight  Extra weight can strain your heart and affect your blood pressure and cholesterol levels. Lose weight with diet and exercise if recommended by your health care provider.  Eat heart-healthy foods  Follow a healthy eating plan as recommended by your health care provider or dietitian.  Heart-healthy foods include:  · High-fiber foods. These include oat bran, oatmeal, and whole-grain breads and cereals.  · Fruits and vegetables.    Avoid:  · Alcohol.  · Fried foods.  · Foods high in saturated fat. These include meats, butter, whole dairy products, shortening, and coconut or palm oil.  · Salty foods. These include canned food, luncheon meat, salty snacks, and fast food.    Keep your cholesterol levels under control  Cholesterol is a substance that is used for many important functions. When your cholesterol levels are high, cholesterol can stick to the insides of your blood vessels, making them narrow or clog. This can lead to chest pain (angina) and a heart attack.  Keep your cholesterol levels under control as recommended by your health care provider. Have your cholesterol checked at least once a year. Target cholesterol levels (in mg/dL) for most people are:  · Total cholesterol below 200.  · LDL cholesterol below 100.  · HDL cholesterol above 40 in men and above 50 in women.  · Triglycerides below 150.    Keep your blood pressure under control  Having high blood pressure (hypertension) puts you at risk for stroke and other forms of heart disease. Keep your blood pressure under control as recommended by your health care provider. Ask your health care provider if you need treatment to lower your blood pressure. If you are 18-39 years of age, have your blood pressure checked every 3-5 years. If you are 40 years of age or older, have your blood pressure checked every year.  Do not use tobacco products  Tobacco smoke can damage your heart and blood vessels. Do not use any tobacco products including cigarettes, chewing tobacco, or electronic cigarettes. If you need help quitting, ask your health care provider.  Take medicines as directed  Take medicines only as directed by your health care provider. Ask your health care provider whether you should take an aspirin every day. Taking aspirin can help  reduce your risk of heart disease and stroke.  Where to find more information:  To find out more about heart disease, visit the American Heart Association's website at www.americanheart.org  This information is not intended to replace advice given to you by your health care provider. Make sure you discuss any questions you have with your health care provider.  Document Released: 08/01/2005 Document Revised: 05/17/2017 Document Reviewed: 02/11/2015  [employer] Interactive Patient Education © 2019 [employer]

## 2019-05-24 ENCOUNTER — APPOINTMENT (OUTPATIENT)
Dept: MAMMOGRAPHY | Facility: HOSPITAL | Age: 79
End: 2019-05-24

## 2019-05-24 ENCOUNTER — HOSPITAL ENCOUNTER (OUTPATIENT)
Dept: MAMMOGRAPHY | Facility: HOSPITAL | Age: 79
Discharge: HOME | End: 2019-05-24
Admitting: GENERAL PRACTICE

## 2019-05-24 DIAGNOSIS — Z00.00 HEALTHCARE MAINTENANCE: ICD-10-CM

## 2019-05-24 DIAGNOSIS — Z12.31 ENCOUNTER FOR SCREENING MAMMOGRAM FOR BREAST CANCER: ICD-10-CM

## 2019-05-24 PROCEDURE — 77063 BREAST TOMOSYNTHESIS BI: CPT | Performed by: RADIOLOGY

## 2019-05-24 PROCEDURE — 77067 SCR MAMMO BI INCL CAD: CPT

## 2019-05-24 PROCEDURE — 77063 BREAST TOMOSYNTHESIS BI: CPT

## 2019-05-24 PROCEDURE — 77067 SCR MAMMO BI INCL CAD: CPT | Performed by: RADIOLOGY

## 2019-07-15 ENCOUNTER — OFFICE VISIT (OUTPATIENT)
Dept: FAMILY MEDICINE CLINIC | Facility: CLINIC | Age: 79
End: 2019-07-15

## 2019-07-15 VITALS
WEIGHT: 187 LBS | DIASTOLIC BLOOD PRESSURE: 80 MMHG | TEMPERATURE: 98.7 F | RESPIRATION RATE: 12 BRPM | OXYGEN SATURATION: 95 % | HEART RATE: 69 BPM | HEIGHT: 66 IN | BODY MASS INDEX: 30.05 KG/M2 | SYSTOLIC BLOOD PRESSURE: 125 MMHG

## 2019-07-15 DIAGNOSIS — E78.2 MIXED HYPERLIPIDEMIA: Primary | ICD-10-CM

## 2019-07-15 DIAGNOSIS — Z87.39 HISTORY OF ACUTE GOUTY ARTHRITIS: ICD-10-CM

## 2019-07-15 DIAGNOSIS — Z00.00 HEALTHCARE MAINTENANCE: ICD-10-CM

## 2019-07-15 DIAGNOSIS — J30.9 CHRONIC ALLERGIC RHINITIS: ICD-10-CM

## 2019-07-15 DIAGNOSIS — I10 ESSENTIAL HYPERTENSION: ICD-10-CM

## 2019-07-15 DIAGNOSIS — L98.9 SKIN LESION: ICD-10-CM

## 2019-07-15 DIAGNOSIS — N39.41 URGE URINARY INCONTINENCE: ICD-10-CM

## 2019-07-15 DIAGNOSIS — F43.9 STRESS: ICD-10-CM

## 2019-07-15 DIAGNOSIS — M85.80 OSTEOPENIA, UNSPECIFIED LOCATION: ICD-10-CM

## 2019-07-15 PROCEDURE — 99214 OFFICE O/P EST MOD 30 MIN: CPT | Performed by: GENERAL PRACTICE

## 2019-07-15 NOTE — PROGRESS NOTES
Subjective   Rylee Hull is a 79 y.o. female.     History of Present Illness     Hypertension  Home blood pressure readings: not doing. Associated signs and symptoms: none. Patient denies: chest pain, palpitations, dyspnea, orthopnea, paroxysmal nocturnal dyspnea and peripheral edema. Current antihypertensive medications includes bisoprolol and losartan. Medication compliance: taking as prescribed.     Dyslipidemia  Compliance with treatment has been excellent. The patient exercises daily. She is currently being prescribed the following medication for her dyslipidemia - lifestyle modifcation. She has had no recent labs    Gouty Arthritis  She denies any attacks since last here and has not required any colchicine.  She has a family history of gout.      Depression  Last year her son  of apparent suicide. 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 She admits to intermittent depression, nervousness, difficulty sleeping, and fatigue. She sleeps well if at home but is at her mother's most nights. There has been no change in her appetite and she denies any suicidal ideation.    Urinary Incontinence  Returns with a more then five year history of urinary frequency, nocturia x 1-2, urgency and occasional incontinence if she does not get to the bathroom quick enough. She denies any dysuria, hematuria, or incontinence with coughing, sneezing, lifting, pushing or pulling. She denies any fecal incontinence.  She is taking fesoterodine with no apparent side effects.  At home she slept 8 hours last night and did not have to get up at all    The following portions of the patient's history were reviewed and updated as appropriate: allergies, current medications, past medical history, past social history and problem list.    Review of Systems   Constitutional: Positive for fatigue. Negative for appetite change, chills, fever and unexpected weight change.   HENT: Positive for congestion,  rhinorrhea and sneezing. Negative for ear pain, sore throat and voice change.    Eyes: Negative for visual disturbance.   Respiratory: Negative for cough, shortness of breath and wheezing.    Cardiovascular: Negative for chest pain, palpitations and leg swelling.   Gastrointestinal: Negative for abdominal pain, blood in stool, constipation, diarrhea, nausea and vomiting.   Endocrine: Negative for polydipsia.   Genitourinary: Negative for difficulty urinating, dysuria, frequency, hematuria, menstrual problem, pelvic pain, urgency, vaginal bleeding and vaginal discharge.   Musculoskeletal: Negative for arthralgias, back pain, joint swelling, myalgias and neck pain.   Skin: Negative for color change.        Lesion right index finger   Neurological: Negative for tremors, weakness, numbness and headaches.   Psychiatric/Behavioral: Positive for dysphoric mood and sleep disturbance. Negative for suicidal ideas. The patient is not nervous/anxious.      Objective   Physical Exam   Constitutional: She is oriented to person, place, and time. No distress.   Appeared younger then stated age. Alert and in fair spirits. No apparent distress. No pallor, jaundice, diaphoresis, or cyanosis.     HENT:   Head: Atraumatic.   Right Ear: Tympanic membrane, external ear and ear canal normal.   Left Ear: Tympanic membrane, external ear and ear canal normal.   Nose: Nose normal.   Mouth/Throat: Oropharynx is clear and moist. Mucous membranes are not pale and not cyanotic.   Eyes: EOM are normal. Pupils are equal, round, and reactive to light. No scleral icterus.   Neck: No JVD present. Carotid bruit is not present. No tracheal deviation present. No thyromegaly present.   Cardiovascular: Normal rate, regular rhythm, S1 normal, S2 normal and intact distal pulses. Exam reveals no gallop, no S3 and no S4.   No murmur heard.  Pulmonary/Chest: Breath sounds normal. No stridor. No respiratory distress.   Abdominal: Soft. Normal aorta and bowel  sounds are normal. She exhibits no distension, no abdominal bruit and no mass. There is no hepatosplenomegaly. There is no tenderness. No hernia.   Musculoskeletal: She exhibits no tenderness or deformity.     Vascular Status -  Her right foot exhibits no edema. Her left foot exhibits no edema.  Lymphadenopathy:        Head (right side): No submandibular adenopathy present.        Head (left side): No submandibular adenopathy present.     She has no cervical adenopathy.   Neurological: She is alert and oriented to person, place, and time. She has normal reflexes. She displays normal reflexes. No cranial nerve deficit. She exhibits normal muscle tone. Coordination normal.   Skin: Skin is warm and dry. No rash noted. Lesion: 1 cm slightly irregular well demarcated slightly scaly erythematous macule along the right medial jawline - appears unchanged. She is not diaphoretic. No cyanosis. No pallor. Nails show no clubbing.   Psychiatric: She has a normal mood and affect.     Assessment/Plan   Problems Addressed this Visit        Cardiovascular and Mediastinum    Mixed hyperlipidemia   Encouraged to continue to work on her diet and exercise plan.  Continue current medication  Scheduled for updated fasting lab work    Relevant Orders    Lipid Panel    TSH    Essential hypertension  As above.   Continue current medication.    Relevant Orders    CBC & Differential    Comprehensive Metabolic Panel       Respiratory    Chronic allergic rhinitis       Musculoskeletal and Integument    Osteopenia  Encouraged to continue to pursue weight bearing activities while exercising joint protection.    Relevant Orders    Vitamin D 25 Hydroxy    Skin lesion  Encouraged to report if any worse or if any new symptoms or concerns.       Genitourinary    Urge urinary incontinence  Improved  Continue current medication  Encouraged to report if this should change.       Other    Healthcare maintenance  Reminded to get a flu shot when available.     History of acute gouty arthritis    Stress  Supportive therapy.     Relevant Orders    TSH

## 2019-09-30 ENCOUNTER — FLU SHOT (OUTPATIENT)
Dept: FAMILY MEDICINE CLINIC | Facility: CLINIC | Age: 79
End: 2019-09-30

## 2019-09-30 ENCOUNTER — LAB (OUTPATIENT)
Dept: FAMILY MEDICINE CLINIC | Facility: CLINIC | Age: 79
End: 2019-09-30

## 2019-09-30 DIAGNOSIS — E78.2 MIXED HYPERLIPIDEMIA: ICD-10-CM

## 2019-09-30 DIAGNOSIS — Z23 FLU VACCINE NEED: ICD-10-CM

## 2019-09-30 DIAGNOSIS — M85.80 OSTEOPENIA, UNSPECIFIED LOCATION: ICD-10-CM

## 2019-09-30 DIAGNOSIS — I10 ESSENTIAL HYPERTENSION: ICD-10-CM

## 2019-09-30 DIAGNOSIS — F43.9 STRESS: ICD-10-CM

## 2019-09-30 LAB
25(OH)D3 SERPL-MCNC: 39.7 NG/ML (ref 30–100)
ALBUMIN SERPL-MCNC: 4.2 G/DL (ref 3.5–5.2)
ALBUMIN/GLOB SERPL: 1.7 G/DL
ALP SERPL-CCNC: 75 U/L (ref 39–117)
ALT SERPL W P-5'-P-CCNC: 16 U/L (ref 1–33)
ANION GAP SERPL CALCULATED.3IONS-SCNC: 13.8 MMOL/L (ref 5–15)
AST SERPL-CCNC: 23 U/L (ref 1–32)
BASOPHILS # BLD AUTO: 0.04 10*3/MM3 (ref 0–0.2)
BASOPHILS NFR BLD AUTO: 0.7 % (ref 0–1.5)
BILIRUB SERPL-MCNC: 0.3 MG/DL (ref 0.2–1.2)
BUN BLD-MCNC: 11 MG/DL (ref 8–23)
BUN/CREAT SERPL: 17.2 (ref 7–25)
CALCIUM SPEC-SCNC: 9 MG/DL (ref 8.6–10.5)
CHLORIDE SERPL-SCNC: 102 MMOL/L (ref 98–107)
CHOLEST SERPL-MCNC: 188 MG/DL (ref 0–200)
CO2 SERPL-SCNC: 27.2 MMOL/L (ref 22–29)
CREAT BLD-MCNC: 0.64 MG/DL (ref 0.57–1)
DEPRECATED RDW RBC AUTO: 42.8 FL (ref 37–54)
EOSINOPHIL # BLD AUTO: 0.31 10*3/MM3 (ref 0–0.4)
EOSINOPHIL NFR BLD AUTO: 5.8 % (ref 0.3–6.2)
ERYTHROCYTE [DISTWIDTH] IN BLOOD BY AUTOMATED COUNT: 13.4 % (ref 12.3–15.4)
GFR SERPL CREATININE-BSD FRML MDRD: 90 ML/MIN/1.73
GLOBULIN UR ELPH-MCNC: 2.5 GM/DL
GLUCOSE BLD-MCNC: 86 MG/DL (ref 65–99)
HCT VFR BLD AUTO: 39.5 % (ref 34–46.6)
HDLC SERPL-MCNC: 78 MG/DL (ref 40–60)
HGB BLD-MCNC: 13.5 G/DL (ref 12–15.9)
IMM GRANULOCYTES # BLD AUTO: 0.01 10*3/MM3 (ref 0–0.05)
IMM GRANULOCYTES NFR BLD AUTO: 0.2 % (ref 0–0.5)
LDLC SERPL CALC-MCNC: 91 MG/DL (ref 0–100)
LDLC/HDLC SERPL: 1.17 {RATIO}
LYMPHOCYTES # BLD AUTO: 1.74 10*3/MM3 (ref 0.7–3.1)
LYMPHOCYTES NFR BLD AUTO: 32.3 % (ref 19.6–45.3)
MCH RBC QN AUTO: 29.7 PG (ref 26.6–33)
MCHC RBC AUTO-ENTMCNC: 34.2 G/DL (ref 31.5–35.7)
MCV RBC AUTO: 86.8 FL (ref 79–97)
MONOCYTES # BLD AUTO: 0.4 10*3/MM3 (ref 0.1–0.9)
MONOCYTES NFR BLD AUTO: 7.4 % (ref 5–12)
NEUTROPHILS # BLD AUTO: 2.89 10*3/MM3 (ref 1.7–7)
NEUTROPHILS NFR BLD AUTO: 53.6 % (ref 42.7–76)
NRBC BLD AUTO-RTO: 0 /100 WBC (ref 0–0.2)
PLATELET # BLD AUTO: 265 10*3/MM3 (ref 140–450)
PMV BLD AUTO: 9.1 FL (ref 6–12)
POTASSIUM BLD-SCNC: 3.8 MMOL/L (ref 3.5–5.2)
PROT SERPL-MCNC: 6.7 G/DL (ref 6–8.5)
RBC # BLD AUTO: 4.55 10*6/MM3 (ref 3.77–5.28)
SODIUM BLD-SCNC: 143 MMOL/L (ref 136–145)
TRIGL SERPL-MCNC: 94 MG/DL (ref 0–150)
TSH SERPL DL<=0.05 MIU/L-ACNC: 1.92 UIU/ML (ref 0.27–4.2)
VLDLC SERPL-MCNC: 18.8 MG/DL (ref 5–40)
WBC NRBC COR # BLD: 5.39 10*3/MM3 (ref 3.4–10.8)

## 2019-09-30 PROCEDURE — 80053 COMPREHEN METABOLIC PANEL: CPT | Performed by: GENERAL PRACTICE

## 2019-09-30 PROCEDURE — 80061 LIPID PANEL: CPT | Performed by: GENERAL PRACTICE

## 2019-09-30 PROCEDURE — 84443 ASSAY THYROID STIM HORMONE: CPT | Performed by: GENERAL PRACTICE

## 2019-09-30 PROCEDURE — 90674 CCIIV4 VAC NO PRSV 0.5 ML IM: CPT | Performed by: GENERAL PRACTICE

## 2019-09-30 PROCEDURE — 82306 VITAMIN D 25 HYDROXY: CPT | Performed by: GENERAL PRACTICE

## 2019-09-30 PROCEDURE — 85025 COMPLETE CBC W/AUTO DIFF WBC: CPT | Performed by: GENERAL PRACTICE

## 2019-09-30 PROCEDURE — G0008 ADMIN INFLUENZA VIRUS VAC: HCPCS | Performed by: GENERAL PRACTICE

## 2019-12-10 ENCOUNTER — OFFICE VISIT (OUTPATIENT)
Dept: FAMILY MEDICINE CLINIC | Facility: CLINIC | Age: 79
End: 2019-12-10

## 2019-12-10 VITALS
OXYGEN SATURATION: 97 % | BODY MASS INDEX: 30.7 KG/M2 | HEART RATE: 72 BPM | TEMPERATURE: 98.8 F | RESPIRATION RATE: 12 BRPM | WEIGHT: 191 LBS | DIASTOLIC BLOOD PRESSURE: 76 MMHG | SYSTOLIC BLOOD PRESSURE: 148 MMHG | HEIGHT: 66 IN

## 2019-12-10 DIAGNOSIS — N39.41 URGE URINARY INCONTINENCE: ICD-10-CM

## 2019-12-10 DIAGNOSIS — I10 ESSENTIAL HYPERTENSION: ICD-10-CM

## 2019-12-10 DIAGNOSIS — Z00.00 HEALTHCARE MAINTENANCE: ICD-10-CM

## 2019-12-10 DIAGNOSIS — L98.9 SKIN LESION: ICD-10-CM

## 2019-12-10 DIAGNOSIS — M85.80 OSTEOPENIA, UNSPECIFIED LOCATION: ICD-10-CM

## 2019-12-10 DIAGNOSIS — E78.2 MIXED HYPERLIPIDEMIA: Primary | ICD-10-CM

## 2019-12-10 DIAGNOSIS — J30.9 CHRONIC ALLERGIC RHINITIS: ICD-10-CM

## 2019-12-10 DIAGNOSIS — Z87.39 HISTORY OF ACUTE GOUTY ARTHRITIS: ICD-10-CM

## 2019-12-10 DIAGNOSIS — F43.9 STRESS: ICD-10-CM

## 2019-12-10 PROCEDURE — 99214 OFFICE O/P EST MOD 30 MIN: CPT | Performed by: GENERAL PRACTICE

## 2019-12-10 NOTE — PROGRESS NOTES
Subjective   Rylee Hull is a 79 y.o. female.     History of Present Illness     Hypertension  Home blood pressure readings: not doing. Associated signs and symptoms: none. Patient denies: chest pain, palpitations, dyspnea, orthopnea, paroxysmal nocturnal dyspnea and peripheral edema. Current antihypertensive medications includes bisoprolol and losartan. She is taking both in the morning at present  Lab Results   Component Value Date    CREATININE 0.64 2019     Lab Results   Component Value Date    K 3.8 2019     Dyslipidemia  Compliance with treatment has been excellent. The patient exercises daily. She is currently being prescribed the following medication for her dyslipidemia - lifestyle modifcation.   Lab Results   Component Value Date    CHOL 188 2019    CHLPL 204 (H) 2015    TRIG 94 2019    HDL 78 (H) 2019    LDL 91 2019     Gouty Arthritis  She denies any attacks since last here and has not required any colchicine.  She has a family history of gout.      Depression  Last year her son  of apparent suicide. 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 She admits to intermittent depression, nervousness, difficulty sleeping, and fatigue. She sleeps well if at home but is at her mother's most nights. There has been no change in her appetite and she denies any suicidal ideation.    Urinary Incontinence  She has a more then five year history of urinary frequency, nocturia x 1-2, urgency and occasional incontinence if she does not get to the bathroom quick enough. She denies any dysuria, hematuria, or incontinence with coughing, sneezing, lifting, pushing or pulling. She denies any fecal incontinence.  She is taking fesoterodine and feels that it has helped some with no apparent side effects so far    Labs  Most recent vitamin D 39.7    The following portions of the patient's history were reviewed and updated as appropriate: allergies,  current medications, past medical history, past social history and problem list.    Review of Systems   Constitutional: Positive for fatigue. Negative for appetite change, chills, fever and unexpected weight change.   HENT: Positive for congestion, rhinorrhea and sneezing. Negative for ear pain, sore throat and voice change.    Eyes: Negative for visual disturbance.   Respiratory: Negative for cough, shortness of breath and wheezing.    Cardiovascular: Negative for chest pain, palpitations and leg swelling.   Gastrointestinal: Negative for abdominal pain, blood in stool, constipation, diarrhea, nausea and vomiting.   Genitourinary: Negative for difficulty urinating, dysuria, frequency, hematuria and urgency.   Musculoskeletal: Negative for arthralgias, back pain, joint swelling, myalgias and neck pain.   Skin: Negative for rash.        Lesion right index finger   Neurological: Negative for tremors, weakness, numbness and headaches.   Psychiatric/Behavioral: Positive for dysphoric mood and sleep disturbance. The patient is not nervous/anxious.      Objective   Physical Exam   Constitutional: She is oriented to person, place, and time. No distress.   Appeared younger then stated age. Alert and in fair spirits. No apparent distress. No pallor, jaundice, diaphoresis, or cyanosis.     HENT:   Head: Atraumatic.   Right Ear: Tympanic membrane, external ear and ear canal normal.   Left Ear: Tympanic membrane, external ear and ear canal normal.   Nose: Nose normal.   Mouth/Throat: Oropharynx is clear and moist. Mucous membranes are not pale and not cyanotic.   Eyes: Pupils are equal, round, and reactive to light. EOM are normal. No scleral icterus.   Neck: No JVD present. Carotid bruit is not present. No tracheal deviation present. No thyromegaly present.   Cardiovascular: Normal rate, regular rhythm, S1 normal, S2 normal and intact distal pulses. Exam reveals no gallop, no S3 and no S4.   No murmur heard.  Pulmonary/Chest:  Breath sounds normal. No stridor. No respiratory distress.   Musculoskeletal: She exhibits no tenderness or deformity.     Vascular Status -  Her right foot exhibits no edema. Her left foot exhibits no edema.  Lymphadenopathy:        Head (right side): No submandibular adenopathy present.        Head (left side): No submandibular adenopathy present.     She has no cervical adenopathy.   Neurological: She is alert and oriented to person, place, and time. She has normal reflexes. She displays normal reflexes. No cranial nerve deficit. She exhibits normal muscle tone. Coordination normal.   Skin: Skin is warm and dry. No rash noted. Lesion: 1 cm slightly irregular well demarcated slightly scaly erythematous macule along the right medial jawline - appears unchanged. She is not diaphoretic. No cyanosis. No pallor. Nails show no clubbing.   Psychiatric: She has a normal mood and affect.     Assessment/Plan   Problems Addressed this Visit        Cardiovascular and Mediastinum    Mixed hyperlipidemia   Encouraged to continue to work on her diet and exercise plan.    Essential hypertension  As above.  Instructed to take her bisoprolol in the evening. If her blood pressure remains elevated at her return will titrate losartan       Respiratory    Chronic allergic rhinitis       Musculoskeletal and Integument    Osteopenia  Encouraged to continue to pursue weight bearing activities while exercising joint protection.    Skin lesion  Will continue to monitor  Encouraged to report if any worse or if any new symptoms or concerns.       Genitourinary    Urge urinary incontinence  Doing better  Encouraged to report if this should change.  Continue current medication       Other    Healthcare maintenance  Recommended an updated Tdap as well as shingrix    Relevant Medications    Zoster Vac Recomb Adjuvanted (SHINGRIX) 50 MCG/0.5ML reconstituted suspension    Tdap (BOOSTRIX) 5-2.5-18.5 LF-MCG/0.5 injection    History of acute gouty  arthritis    Stress  Supportive therapy  Encouraged to report if any worse or if any new symptoms or concerns.

## 2020-01-02 DIAGNOSIS — N39.41 URGE URINARY INCONTINENCE: ICD-10-CM

## 2020-01-03 RX ORDER — FESOTERODINE FUMARATE 8 MG/1
TABLET, FILM COATED, EXTENDED RELEASE ORAL
Qty: 30 TABLET | Refills: 5 | Status: SHIPPED | OUTPATIENT
Start: 2020-01-03 | End: 2020-07-20

## 2020-03-18 DIAGNOSIS — J30.9 CHRONIC ALLERGIC RHINITIS: ICD-10-CM

## 2020-03-18 RX ORDER — FLUTICASONE PROPIONATE 50 MCG
SPRAY, SUSPENSION (ML) NASAL
Qty: 48 G | Refills: 3 | Status: SHIPPED | OUTPATIENT
Start: 2020-03-18

## 2020-03-23 DIAGNOSIS — I10 ESSENTIAL HYPERTENSION: ICD-10-CM

## 2020-03-23 RX ORDER — BISOPROLOL FUMARATE 10 MG/1
TABLET, FILM COATED ORAL
Qty: 90 TABLET | Refills: 3 | Status: SHIPPED | OUTPATIENT
Start: 2020-03-23 | End: 2021-01-20

## 2020-04-06 DIAGNOSIS — I10 ESSENTIAL HYPERTENSION: ICD-10-CM

## 2020-04-06 DIAGNOSIS — Z87.39 HISTORY OF ACUTE GOUTY ARTHRITIS: ICD-10-CM

## 2020-04-06 RX ORDER — LOSARTAN POTASSIUM 50 MG/1
50 TABLET ORAL EVERY MORNING
Qty: 90 TABLET | Refills: 3 | Status: SHIPPED | OUTPATIENT
Start: 2020-04-06 | End: 2020-07-27 | Stop reason: SDUPTHER

## 2020-04-10 ENCOUNTER — OFFICE VISIT (OUTPATIENT)
Dept: FAMILY MEDICINE CLINIC | Facility: CLINIC | Age: 80
End: 2020-04-10

## 2020-04-10 DIAGNOSIS — N39.41 URGE URINARY INCONTINENCE: ICD-10-CM

## 2020-04-10 DIAGNOSIS — E78.2 MIXED HYPERLIPIDEMIA: Primary | ICD-10-CM

## 2020-04-10 DIAGNOSIS — F43.9 STRESS: ICD-10-CM

## 2020-04-10 DIAGNOSIS — J30.9 CHRONIC ALLERGIC RHINITIS: ICD-10-CM

## 2020-04-10 DIAGNOSIS — Z00.00 HEALTHCARE MAINTENANCE: ICD-10-CM

## 2020-04-10 DIAGNOSIS — Z87.39 HISTORY OF ACUTE GOUTY ARTHRITIS: ICD-10-CM

## 2020-04-10 DIAGNOSIS — I10 ESSENTIAL HYPERTENSION: ICD-10-CM

## 2020-04-10 DIAGNOSIS — M85.80 OSTEOPENIA, UNSPECIFIED LOCATION: ICD-10-CM

## 2020-04-10 PROCEDURE — G2025 DIS SITE TELE SVCS RHC/FQHC: HCPCS | Performed by: GENERAL PRACTICE

## 2020-04-10 NOTE — PROGRESS NOTES
Subjective   Rylee Hull is a 80 y.o. female.     History of Present Illness     This visit has been rescheduled as a phone visit to comply with patient safety concerns in accordance with CDC recommendations. Total time of discussion was 12 minutes.    Hypertension  Home blood pressure readings: has been checking once every week or two since last here - generally running at or near goal at home. Associated signs and symptoms: none. Patient denies: chest pain, palpitations, dyspnea, orthopnea, paroxysmal nocturnal dyspnea and peripheral edema. Current antihypertensive medications includes bisoprolol and losartan.     Dyslipidemia  Compliance with treatment remains good. The patient exercises daily. She is currently being prescribed the following medication for her dyslipidemia - lifestyle modifcation.     Gouty Arthritis  She denies any attacks since last here and has not required any colchicine.  She has a family history of gout.      Depression  Several years ago her son  of apparent suicide. 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 She admits to intermittent depression, nervousness, difficulty sleeping, and fatigue. She sleeps well if at home but is at her mother's most nights. There has been no change in her appetite and she denies any suicidal ideation.    Urinary Incontinence  She has a more then five year history of urinary frequency, nocturia x 1-2, urgency and occasional incontinence if she does not get to the bathroom quick enough. She denies any dysuria, hematuria, or incontinence with coughing, sneezing, lifting, pushing or pulling. She denies any fecal incontinence.  She continues to feel that fesoterodine has helped and denies any apparent side effects so far    The following portions of the patient's history were reviewed and updated as appropriate: allergies, current medications, past medical history, past social history and problem list.    Review of Systems    Constitutional: Positive for fatigue. Negative for appetite change, chills, fever and unexpected weight change.   HENT: Positive for congestion, rhinorrhea and sneezing. Negative for ear pain and sore throat.    Eyes: Negative for visual disturbance.   Respiratory: Negative for cough, shortness of breath and wheezing.    Cardiovascular: Negative for chest pain, palpitations and leg swelling.   Gastrointestinal: Negative for abdominal pain, blood in stool, constipation, diarrhea, nausea and vomiting.   Genitourinary: Positive for frequency and urgency. Negative for dysuria and hematuria.   Musculoskeletal: Negative for arthralgias and back pain.   Skin: Negative for rash.        Lesion right index finger   Neurological: Negative for weakness, numbness and headaches.   Psychiatric/Behavioral: Positive for dysphoric mood and sleep disturbance. The patient is not nervous/anxious.      Objective   Physical Exam   Constitutional:   Alert, oriented, and in fair spirits.  No apparent distress     Assessment/Plan   Problems Addressed this Visit        Cardiovascular and Mediastinum    Mixed hyperlipidemia   Encouraged to continue to work on her diet and exercise plan.    Essential hypertension  As above.   Continue current medication.  Instructed to check her blood pressure weekly and to bring both her log and monitor with her at her return       Respiratory    Chronic allergic rhinitis       Musculoskeletal and Integument    Osteopenia       Genitourinary    Urge urinary incontinence  Chronic.  Stable.  Continue current medication       Other    Healthcare maintenance  We will discuss an updated mammogram at her return    History of acute gouty arthritis    Stress  Significant situational component.   Supportive therapy.   Encouraged to report if any worse or if any new symptoms or concerns.

## 2020-07-20 DIAGNOSIS — N39.41 URGE URINARY INCONTINENCE: ICD-10-CM

## 2020-07-20 RX ORDER — FESOTERODINE FUMARATE 8 MG/1
TABLET, FILM COATED, EXTENDED RELEASE ORAL
Qty: 30 TABLET | Refills: 5 | Status: SHIPPED | OUTPATIENT
Start: 2020-07-20 | End: 2020-10-16 | Stop reason: SDUPTHER

## 2020-07-27 ENCOUNTER — OFFICE VISIT (OUTPATIENT)
Dept: FAMILY MEDICINE CLINIC | Facility: CLINIC | Age: 80
End: 2020-07-27

## 2020-07-27 DIAGNOSIS — Z87.39 HISTORY OF ACUTE GOUTY ARTHRITIS: ICD-10-CM

## 2020-07-27 DIAGNOSIS — Z00.00 HEALTHCARE MAINTENANCE: ICD-10-CM

## 2020-07-27 DIAGNOSIS — F43.9 STRESS: ICD-10-CM

## 2020-07-27 DIAGNOSIS — M85.80 OSTEOPENIA, UNSPECIFIED LOCATION: ICD-10-CM

## 2020-07-27 DIAGNOSIS — I10 ESSENTIAL HYPERTENSION: ICD-10-CM

## 2020-07-27 DIAGNOSIS — E78.2 MIXED HYPERLIPIDEMIA: Primary | ICD-10-CM

## 2020-07-27 DIAGNOSIS — N39.41 URGE URINARY INCONTINENCE: ICD-10-CM

## 2020-07-27 DIAGNOSIS — J30.9 CHRONIC ALLERGIC RHINITIS: ICD-10-CM

## 2020-07-27 PROCEDURE — G2025 DIS SITE TELE SVCS RHC/FQHC: HCPCS | Performed by: GENERAL PRACTICE

## 2020-07-27 RX ORDER — LOSARTAN POTASSIUM 100 MG/1
100 TABLET ORAL EVERY MORNING
Qty: 90 TABLET | Refills: 3 | Status: SHIPPED | OUTPATIENT
Start: 2020-07-27 | End: 2021-07-20

## 2020-07-27 NOTE — PROGRESS NOTES
Subjective   Rylee Hull is a 80 y.o. female.     History of Present Illness     This visit has been rescheduled as a phone visit to comply with patient safety concerns in accordance with CDC recommendations. Total time of discussion was 13 minutes.    You have chosen to receive care through a telephone visit. Do you consent to use a telephone visit for your medical care today? Yes    Depression  Several years ago her son  of apparent suicide. 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 She admits to intermittent depression, nervousness, difficulty sleeping, and fatigue. There has been no change in her appetite and she denies any suicidal ideation.  She has been sleeping somewhat better as of late    Hypertension  Home blood pressure readings: have generally been at or slightly above goal at home. Associated signs and symptoms: none. Patient denies: chest pain, palpitations, dyspnea, orthopnea, paroxysmal nocturnal dyspnea and peripheral edema. Current antihypertensive medications includes bisoprolol and losartan.     Dyslipidemia  Compliance with treatment remains good. The patient exercises daily. She is currently being prescribed the following medication for her dyslipidemia - lifestyle modifcation.  She has had no recent labs    Gouty Arthritis  She denies any attacks since last here and has not required any colchicine.  She has a family history of gout.      Urinary Incontinence  She has a more then five year history of urinary frequency, nocturia x 1-2, urgency and occasional incontinence if she does not get to the bathroom quick enough. She denies any dysuria, hematuria, or incontinence with coughing, sneezing, lifting, pushing or pulling. She denies any fecal incontinence.  She continues to feel that fesoterodine has helped and denies any apparent side effects so far    The following portions of the patient's history were reviewed and updated as appropriate: allergies, current  medications, past medical history, past social history and problem list.    Review of Systems   Constitutional: Positive for fatigue. Negative for appetite change, chills, fever and unexpected weight change.   HENT: Positive for congestion, rhinorrhea and sneezing. Negative for ear pain and sore throat.    Eyes: Negative for visual disturbance.   Respiratory: Negative for cough, shortness of breath and wheezing.    Cardiovascular: Negative for chest pain, palpitations and leg swelling.   Gastrointestinal: Negative for abdominal pain, blood in stool, constipation, diarrhea, nausea and vomiting.   Genitourinary: Positive for frequency and urgency. Negative for dysuria and hematuria.   Musculoskeletal: Negative for arthralgias and back pain.   Skin: Negative for rash.        Lesion right index finger   Neurological: Negative for weakness, numbness and headaches.   Psychiatric/Behavioral: Positive for dysphoric mood. Negative for sleep disturbance. The patient is not nervous/anxious.      Objective   Physical Exam   Constitutional:   Alert and oriented.  Bright and in good spirits.  No apparent distress or shortness of breath.     Assessment/Plan   Problems Addressed this Visit        Cardiovascular and Mediastinum    Essential hypertension   Hypertension: marginal. Evidence of target organ damage: none.  Encouraged to continue to work on diet and exercise plan.   Losartan will be titrated to 100 daily    Relevant Medications    losartan (COZAAR) 100 MG tablet    Mixed hyperlipidemia   As above.   Updated labs will be drawn at her return.       Respiratory    Chronic allergic rhinitis       Musculoskeletal and Integument    Osteopenia  Encouraged to continue to pursue weight bearing activities while exercising joint protection.       Genitourinary    Urge urinary incontinence  Continue current medication       Other    Healthcare maintenance  Encouraged to follow-up with her scheduled mammogram  Reminded to get a flu  shot when available.    History of acute gouty arthritis    Relevant Medications    losartan (COZAAR) 100 MG tablet    Stress  Supportive therapy  Encouraged to report if any worse or if any new symptoms or concerns.

## 2020-08-12 ENCOUNTER — HOSPITAL ENCOUNTER (OUTPATIENT)
Dept: MAMMOGRAPHY | Facility: HOSPITAL | Age: 80
Discharge: HOME | End: 2020-08-12
Admitting: GENERAL PRACTICE

## 2020-08-12 DIAGNOSIS — Z12.31 VISIT FOR SCREENING MAMMOGRAM: ICD-10-CM

## 2020-08-12 PROCEDURE — 77063 BREAST TOMOSYNTHESIS BI: CPT

## 2020-08-12 PROCEDURE — 77067 SCR MAMMO BI INCL CAD: CPT

## 2020-08-12 PROCEDURE — 77067 SCR MAMMO BI INCL CAD: CPT | Performed by: RADIOLOGY

## 2020-08-12 PROCEDURE — 77063 BREAST TOMOSYNTHESIS BI: CPT | Performed by: RADIOLOGY

## 2020-10-16 DIAGNOSIS — N39.41 URGE URINARY INCONTINENCE: ICD-10-CM

## 2020-10-16 RX ORDER — FESOTERODINE FUMARATE 8 MG/1
8 TABLET, FILM COATED, EXTENDED RELEASE ORAL DAILY
Qty: 90 TABLET | Refills: 5 | Status: SHIPPED | OUTPATIENT
Start: 2020-10-16 | End: 2022-01-05

## 2020-10-30 ENCOUNTER — OFFICE VISIT (OUTPATIENT)
Dept: FAMILY MEDICINE CLINIC | Facility: CLINIC | Age: 80
End: 2020-10-30

## 2020-10-30 DIAGNOSIS — M85.80 OSTEOPENIA, UNSPECIFIED LOCATION: ICD-10-CM

## 2020-10-30 DIAGNOSIS — J30.9 CHRONIC ALLERGIC RHINITIS: ICD-10-CM

## 2020-10-30 DIAGNOSIS — F43.9 STRESS: ICD-10-CM

## 2020-10-30 DIAGNOSIS — N39.41 URGE URINARY INCONTINENCE: ICD-10-CM

## 2020-10-30 DIAGNOSIS — Z00.00 HEALTHCARE MAINTENANCE: ICD-10-CM

## 2020-10-30 DIAGNOSIS — Z87.39 HISTORY OF ACUTE GOUTY ARTHRITIS: ICD-10-CM

## 2020-10-30 DIAGNOSIS — I10 ESSENTIAL HYPERTENSION: ICD-10-CM

## 2020-10-30 DIAGNOSIS — E78.2 MIXED HYPERLIPIDEMIA: Primary | ICD-10-CM

## 2020-10-30 PROCEDURE — 80053 COMPREHEN METABOLIC PANEL: CPT | Performed by: GENERAL PRACTICE

## 2020-10-30 PROCEDURE — 82306 VITAMIN D 25 HYDROXY: CPT | Performed by: GENERAL PRACTICE

## 2020-10-30 PROCEDURE — 84550 ASSAY OF BLOOD/URIC ACID: CPT | Performed by: GENERAL PRACTICE

## 2020-10-30 PROCEDURE — 99214 OFFICE O/P EST MOD 30 MIN: CPT | Performed by: GENERAL PRACTICE

## 2020-10-30 PROCEDURE — 80061 LIPID PANEL: CPT | Performed by: GENERAL PRACTICE

## 2020-10-30 PROCEDURE — 84443 ASSAY THYROID STIM HORMONE: CPT | Performed by: GENERAL PRACTICE

## 2020-10-30 PROCEDURE — 86803 HEPATITIS C AB TEST: CPT | Performed by: GENERAL PRACTICE

## 2020-10-30 PROCEDURE — 85025 COMPLETE CBC W/AUTO DIFF WBC: CPT | Performed by: GENERAL PRACTICE

## 2020-10-30 RX ORDER — HYDRALAZINE HYDROCHLORIDE 25 MG/1
25 TABLET, FILM COATED ORAL 2 TIMES DAILY
Qty: 180 TABLET | Refills: 3 | Status: SHIPPED | OUTPATIENT
Start: 2020-10-30 | End: 2021-10-13

## 2020-10-30 NOTE — PROGRESS NOTES
Shari Hull is a 80 y.o. female.     History of Present Illness     Depression  Several years ago her son  of apparent suicide. 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 She admits to intermittent depression, nervousness, difficulty sleeping, and fatigue. There has been no change in her appetite and she denies any suicidal ideation.      Hypertension  Home blood pressure readings: have generally been at goal at home. Associated signs and symptoms: none. Patient denies: chest pain, palpitations, dyspnea, orthopnea, paroxysmal nocturnal dyspnea and peripheral edema. Current antihypertensive medications includes bisoprolol and losartan.     Dyslipidemia  Compliance with treatment remains good. The patient exercises daily. She is currently being prescribed the following medication for her dyslipidemia - lifestyle modifcation.  She has had no recent labs but is fasting today    Gouty Arthritis  She denies any attacks since last here and has not required any colchicine.  She has a family history of gout.      Urinary Incontinence  She has a more then five year history of urinary frequency, nocturia x 1-2, urgency and occasional incontinence if she does not get to the bathroom quick enough. She denies any dysuria, hematuria, or incontinence with coughing, sneezing, lifting, pushing or pulling. She denies any fecal incontinence.  She continues to feel that fesoterodine has helped and denies any apparent side effects so far    The following portions of the patient's history were reviewed and updated as appropriate: allergies, current medications, past medical history, past social history and problem list.    Review of Systems   Constitutional: Positive for fatigue. Negative for appetite change, chills, fever and unexpected weight change.   HENT: Positive for congestion, rhinorrhea and sneezing. Negative for ear pain and sore throat.    Eyes: Negative for visual disturbance.    Respiratory: Negative for cough, shortness of breath and wheezing.    Cardiovascular: Negative for chest pain, palpitations and leg swelling.   Gastrointestinal: Negative for abdominal pain, blood in stool, constipation, diarrhea, nausea and vomiting.   Genitourinary: Positive for frequency and urgency. Negative for dysuria and hematuria.   Musculoskeletal: Negative for arthralgias and back pain.   Skin: Negative for rash.        Lesion right index finger   Neurological: Negative for weakness, numbness and headaches.   Psychiatric/Behavioral: Positive for dysphoric mood. Negative for sleep disturbance. The patient is not nervous/anxious.      Objective   Physical Exam  Constitutional:       General: She is not in acute distress.     Appearance: Normal appearance. She is well-developed. She is not diaphoretic.   HENT:      Head: Atraumatic.      Right Ear: Tympanic membrane, ear canal and external ear normal.      Left Ear: Tympanic membrane, ear canal and external ear normal.   Eyes:      Conjunctiva/sclera: Conjunctivae normal.   Neck:      Thyroid: No thyroid mass or thyromegaly.      Vascular: No carotid bruit or JVD.      Trachea: Trachea normal. No tracheal deviation.   Cardiovascular:      Rate and Rhythm: Normal rate and regular rhythm.      Heart sounds: Normal heart sounds, S1 normal and S2 normal. No murmur. No gallop.    Pulmonary:      Effort: Pulmonary effort is normal.      Breath sounds: Normal breath sounds.   Abdominal:      General: Bowel sounds are normal. There is no distension or abdominal bruit.      Palpations: Abdomen is soft. There is no hepatomegaly, splenomegaly or mass.      Tenderness: There is no abdominal tenderness.      Hernia: No hernia is present.   Musculoskeletal:      Right lower leg: No edema.      Left lower leg: No edema.   Lymphadenopathy:      Head:      Right side of head: No submental, submandibular, tonsillar, preauricular, posterior auricular or occipital adenopathy.       Left side of head: No submental, submandibular, tonsillar, preauricular, posterior auricular or occipital adenopathy.      Cervical: No cervical adenopathy.      Upper Body:      Right upper body: No supraclavicular adenopathy.      Left upper body: No supraclavicular adenopathy.   Skin:     General: Skin is warm.      Coloration: Skin is not cyanotic, jaundiced or pale.      Findings: No rash.      Nails: There is no clubbing.     Neurological:      Mental Status: She is alert and oriented to person, place, and time.      Cranial Nerves: No cranial nerve deficit.      Motor: No tremor.      Coordination: Coordination normal.      Gait: Gait normal.   Psychiatric:         Attention and Perception: Attention normal.         Mood and Affect: Mood normal.         Speech: Speech normal.         Behavior: Behavior normal.         Thought Content: Thought content normal. Thought content does not include suicidal ideation.       Assessment/Plan   Problems Addressed this Visit        Cardiovascular and Mediastinum    Essential hypertension   Hypertension: marginal. Evidence of target organ damage: none.  Encouraged to continue to work on diet and exercise plan.   Reviewed options and agreed on the addition of hydralazine    Relevant Medications    hydrALAZINE (APRESOLINE) 25 MG tablet    Other Relevant Orders    CBC & Differential (Completed)    Comprehensive Metabolic Panel (Completed)    CBC Auto Differential (Completed)    Mixed hyperlipidemia   As above.  Updated labs drawn.    Relevant Orders    Lipid Panel (Completed)    TSH (Completed)       Respiratory    Chronic allergic rhinitis       Musculoskeletal and Integument    Osteopenia  Encouraged to continue to pursue weight bearing activities while exercising joint protection.    Relevant Orders    Vitamin D 25 Hydroxy (Completed)       Genitourinary    Urge urinary incontinence  Continue current medication       Other    Healthcare maintenance  Patient has  already received a flu shot fall.  Reminded to follow-up with [facility].  Prescription emailed to her pharmacy    Relevant Medications    Zoster Vac Recomb Adjuvanted (Shingrix) 50 MCG/0.5ML reconstituted suspension    Other Relevant Orders    Hepatitis C Antibody (Completed)    History of acute gouty arthritis    Relevant Orders    Uric Acid (Completed)    Stress  Supportive therapy.   Encouraged to report if any worse or if any new symptoms or concerns.      Diagnoses       Codes Comments    Mixed hyperlipidemia    -  Primary ICD-10-CM: E78.2  ICD-9-CM: 272.2     Essential hypertension     ICD-10-CM: I10  ICD-9-CM: 401.9     Chronic allergic rhinitis     ICD-10-CM: J30.9  ICD-9-CM: 477.9     Osteopenia, unspecified location     ICD-10-CM: M85.80  ICD-9-CM: 733.90     Urge urinary incontinence     ICD-10-CM: N39.41  ICD-9-CM: 788.31     Stress     ICD-10-CM: F43.9  ICD-9-CM: V62.89     History of acute gouty arthritis     ICD-10-CM: Z87.39  ICD-9-CM: V13.4     Healthcare maintenance     ICD-10-CM: Z00.00  ICD-9-CM: V70.0

## 2020-10-31 VITALS
WEIGHT: 192 LBS | DIASTOLIC BLOOD PRESSURE: 76 MMHG | OXYGEN SATURATION: 96 % | BODY MASS INDEX: 30.86 KG/M2 | HEIGHT: 66 IN | TEMPERATURE: 96.6 F | SYSTOLIC BLOOD PRESSURE: 152 MMHG | HEART RATE: 77 BPM | RESPIRATION RATE: 14 BRPM

## 2020-10-31 LAB
25(OH)D3 SERPL-MCNC: 50 NG/ML (ref 30–100)
ALBUMIN SERPL-MCNC: 4.4 G/DL (ref 3.5–5.2)
ALBUMIN/GLOB SERPL: 1.6 G/DL
ALP SERPL-CCNC: 75 U/L (ref 39–117)
ALT SERPL W P-5'-P-CCNC: 19 U/L (ref 1–33)
ANION GAP SERPL CALCULATED.3IONS-SCNC: 10.7 MMOL/L (ref 5–15)
AST SERPL-CCNC: 27 U/L (ref 1–32)
BASOPHILS # BLD AUTO: 0.04 10*3/MM3 (ref 0–0.2)
BASOPHILS NFR BLD AUTO: 0.7 % (ref 0–1.5)
BILIRUB SERPL-MCNC: 0.5 MG/DL (ref 0–1.2)
BUN SERPL-MCNC: 16 MG/DL (ref 8–23)
BUN/CREAT SERPL: 26.2 (ref 7–25)
CALCIUM SPEC-SCNC: 9.6 MG/DL (ref 8.6–10.5)
CHLORIDE SERPL-SCNC: 105 MMOL/L (ref 98–107)
CHOLEST SERPL-MCNC: 194 MG/DL (ref 0–200)
CO2 SERPL-SCNC: 25.3 MMOL/L (ref 22–29)
CREAT SERPL-MCNC: 0.61 MG/DL (ref 0.57–1)
DEPRECATED RDW RBC AUTO: 40.7 FL (ref 37–54)
EOSINOPHIL # BLD AUTO: 0.12 10*3/MM3 (ref 0–0.4)
EOSINOPHIL NFR BLD AUTO: 2.2 % (ref 0.3–6.2)
ERYTHROCYTE [DISTWIDTH] IN BLOOD BY AUTOMATED COUNT: 13 % (ref 12.3–15.4)
GFR SERPL CREATININE-BSD FRML MDRD: 95 ML/MIN/1.73
GLOBULIN UR ELPH-MCNC: 2.8 GM/DL
GLUCOSE SERPL-MCNC: 89 MG/DL (ref 65–99)
HCT VFR BLD AUTO: 40.4 % (ref 34–46.6)
HCV AB SER DONR QL: NORMAL
HDLC SERPL-MCNC: 73 MG/DL (ref 40–60)
HGB BLD-MCNC: 13.6 G/DL (ref 12–15.9)
IMM GRANULOCYTES # BLD AUTO: 0.01 10*3/MM3 (ref 0–0.05)
IMM GRANULOCYTES NFR BLD AUTO: 0.2 % (ref 0–0.5)
LDLC SERPL CALC-MCNC: 107 MG/DL (ref 0–100)
LDLC/HDLC SERPL: 1.45 {RATIO}
LYMPHOCYTES # BLD AUTO: 1.74 10*3/MM3 (ref 0.7–3.1)
LYMPHOCYTES NFR BLD AUTO: 31.9 % (ref 19.6–45.3)
MCH RBC QN AUTO: 29.1 PG (ref 26.6–33)
MCHC RBC AUTO-ENTMCNC: 33.7 G/DL (ref 31.5–35.7)
MCV RBC AUTO: 86.3 FL (ref 79–97)
MONOCYTES # BLD AUTO: 0.34 10*3/MM3 (ref 0.1–0.9)
MONOCYTES NFR BLD AUTO: 6.2 % (ref 5–12)
NEUTROPHILS NFR BLD AUTO: 3.2 10*3/MM3 (ref 1.7–7)
NEUTROPHILS NFR BLD AUTO: 58.8 % (ref 42.7–76)
NRBC BLD AUTO-RTO: 0.2 /100 WBC (ref 0–0.2)
PLATELET # BLD AUTO: 284 10*3/MM3 (ref 140–450)
PMV BLD AUTO: 9 FL (ref 6–12)
POTASSIUM SERPL-SCNC: 4.4 MMOL/L (ref 3.5–5.2)
PROT SERPL-MCNC: 7.2 G/DL (ref 6–8.5)
RBC # BLD AUTO: 4.68 10*6/MM3 (ref 3.77–5.28)
SODIUM SERPL-SCNC: 141 MMOL/L (ref 136–145)
TRIGL SERPL-MCNC: 77 MG/DL (ref 0–150)
TSH SERPL DL<=0.05 MIU/L-ACNC: 0.99 UIU/ML (ref 0.27–4.2)
URATE SERPL-MCNC: 5.9 MG/DL (ref 2.4–5.7)
VLDLC SERPL-MCNC: 14 MG/DL (ref 5–40)
WBC # BLD AUTO: 5.45 10*3/MM3 (ref 3.4–10.8)

## 2021-01-19 DIAGNOSIS — I10 ESSENTIAL HYPERTENSION: ICD-10-CM

## 2021-01-20 RX ORDER — BISOPROLOL FUMARATE 10 MG/1
TABLET, FILM COATED ORAL
Qty: 90 TABLET | Refills: 3 | Status: SHIPPED | OUTPATIENT
Start: 2021-01-20 | End: 2022-01-05

## 2021-03-02 ENCOUNTER — OFFICE VISIT (OUTPATIENT)
Dept: FAMILY MEDICINE CLINIC | Facility: CLINIC | Age: 81
End: 2021-03-02

## 2021-03-02 DIAGNOSIS — Z00.00 HEALTHCARE MAINTENANCE: ICD-10-CM

## 2021-03-02 DIAGNOSIS — F32.9 REACTIVE DEPRESSION: ICD-10-CM

## 2021-03-02 DIAGNOSIS — Z87.39 HISTORY OF ACUTE GOUTY ARTHRITIS: ICD-10-CM

## 2021-03-02 DIAGNOSIS — E78.2 MIXED HYPERLIPIDEMIA: ICD-10-CM

## 2021-03-02 DIAGNOSIS — J30.9 CHRONIC ALLERGIC RHINITIS: Primary | ICD-10-CM

## 2021-03-02 DIAGNOSIS — M85.80 OSTEOPENIA, UNSPECIFIED LOCATION: ICD-10-CM

## 2021-03-02 DIAGNOSIS — N39.41 URGE URINARY INCONTINENCE: ICD-10-CM

## 2021-03-02 DIAGNOSIS — I10 ESSENTIAL HYPERTENSION: ICD-10-CM

## 2021-03-02 PROCEDURE — 99214 OFFICE O/P EST MOD 30 MIN: CPT | Performed by: GENERAL PRACTICE

## 2021-03-03 VITALS
HEART RATE: 52 BPM | TEMPERATURE: 97.3 F | SYSTOLIC BLOOD PRESSURE: 152 MMHG | OXYGEN SATURATION: 90 % | DIASTOLIC BLOOD PRESSURE: 70 MMHG | BODY MASS INDEX: 31.66 KG/M2 | RESPIRATION RATE: 14 BRPM | HEIGHT: 66 IN | WEIGHT: 197 LBS

## 2021-03-03 PROBLEM — F32.9 REACTIVE DEPRESSION: Status: ACTIVE | Noted: 2019-01-28

## 2021-07-09 ENCOUNTER — OFFICE VISIT (OUTPATIENT)
Dept: FAMILY MEDICINE CLINIC | Facility: CLINIC | Age: 81
End: 2021-07-09

## 2021-07-09 DIAGNOSIS — M85.80 OSTEOPENIA, UNSPECIFIED LOCATION: ICD-10-CM

## 2021-07-09 DIAGNOSIS — M81.0 AGE-RELATED OSTEOPOROSIS WITHOUT CURRENT PATHOLOGICAL FRACTURE: ICD-10-CM

## 2021-07-09 DIAGNOSIS — F32.9 REACTIVE DEPRESSION: ICD-10-CM

## 2021-07-09 DIAGNOSIS — Z00.00 HEALTHCARE MAINTENANCE: ICD-10-CM

## 2021-07-09 DIAGNOSIS — J30.9 CHRONIC ALLERGIC RHINITIS: Primary | ICD-10-CM

## 2021-07-09 DIAGNOSIS — I10 ESSENTIAL HYPERTENSION: ICD-10-CM

## 2021-07-09 DIAGNOSIS — N39.41 URGE URINARY INCONTINENCE: ICD-10-CM

## 2021-07-09 DIAGNOSIS — Z87.39 HISTORY OF ACUTE GOUTY ARTHRITIS: ICD-10-CM

## 2021-07-09 DIAGNOSIS — E78.2 MIXED HYPERLIPIDEMIA: ICD-10-CM

## 2021-07-09 DIAGNOSIS — Z12.31 ENCOUNTER FOR SCREENING MAMMOGRAM FOR BREAST CANCER: ICD-10-CM

## 2021-07-09 PROCEDURE — G0439 PPPS, SUBSEQ VISIT: HCPCS | Performed by: GENERAL PRACTICE

## 2021-07-09 RX ORDER — MONTELUKAST SODIUM 10 MG/1
10 TABLET ORAL DAILY
Qty: 30 TABLET | Refills: 5 | Status: SHIPPED | OUTPATIENT
Start: 2021-07-09 | End: 2021-11-05 | Stop reason: SDUPTHER

## 2021-07-09 NOTE — PROGRESS NOTES
The ABCs of the Annual Wellness Visit  Subsequent Medicare Wellness Visit    Chief Complaint  Annual well visit    Subjective   History of Present Illness:  Rylee Hull is a 81 y.o. female who presents for a Subsequent Medicare Wellness Visit.    Depression  Her mother  early this year.  Her daughter has been undergoing treatment for breast cancer and her son  of an apparent suicide several years ago. She admits to intermittent depression, nervousness, difficulty sleeping, and fatigue. There has been no change in her appetite and she denies any suicidal ideation.  She has moved back into her home and her daughter is staying with her at present.    Hypertension  Home blood pressure readings: have generally been at goal at home. Associated signs and symptoms: none.  She continues to deny any chest pain, palpitations, dyspnea, orthopnea, paroxysmal nocturnal dyspnea or peripheral edema. Current antihypertensive medications includes bisoprolol, losartan, and hydralazine.      Dyslipidemia  Compliance with treatment remains good. The patient exercises daily. She is currently being prescribed the following medication for her dyslipidemia - lifestyle modifcation. She has had no recent labs    Gouty Arthritis  She denies any attacks since last here and has not required any colchicine.  She has a family history of gout.      Urinary Incontinence  She has a more then five year history of urinary frequency, nocturia x 1-2, urgency and occasional incontinence if she does not get to the bathroom quick enough. She denies any dysuria, hematuria, or incontinence with coughing, sneezing, lifting, pushing or pulling. She denies any fecal incontinence.  She continues to feel that fesoterodine has helped and denies any apparent side effects     Chronic Allergic Rhinitis   Through summer she has experienced increased rhinorrhea, nasal congestion, and postnasal drip. When severe the symptoms are associated with a dry  cough. There is no history of any other upper respiratory tract symptoms and she denies any chest pain, shortness of breath, hemoptysis, fever, or chills. She remains on nasal fluticasone and oral cetirizine as prescribed    HEALTH RISK ASSESSMENT    Recent Hospitalizations:  No hospitalization(s) within the last year.    Current Medical Providers:  Patient Care Team:  Jorge Boss MD as PCP - General (Family Medicine)    Smoking Status:  Social History     Tobacco Use   Smoking Status Never Smoker   Smokeless Tobacco Never Used       Alcohol Consumption:  Social History     Substance and Sexual Activity   Alcohol Use No       Depression Screen:   PHQ-2/PHQ-9 Depression Screening 7/9/2021   Little interest or pleasure in doing things 0   Feeling down, depressed, or hopeless 0   Trouble falling or staying asleep, or sleeping too much 0   Feeling tired or having little energy 0   Poor appetite or overeating 0   Feeling bad about yourself - or that you are a failure or have let yourself or your family down 0   Trouble concentrating on things, such as reading the newspaper or watching television 0   Moving or speaking so slowly that other people could have noticed. Or the opposite - being so fidgety or restless that you have been moving around a lot more than usual 0   Thoughts that you would be better off dead, or of hurting yourself in some way 0   Total Score 0   If you checked off any problems, how difficult have these problems made it for you to do your work, take care of things at home, or get along with other people? Not difficult at all       Fall Risk Screen:  [facility] has not been completed.    Health Habits and Functional and Cognitive Screening:  Functional & Cognitive Status 7/9/2021   Do you have difficulty preparing food and eating? No   Do you have difficulty bathing yourself, getting dressed or grooming yourself? No   Do you have difficulty using the toilet? No   Do you  have difficulty moving around from place to place? No   Do you have trouble with steps or getting out of a bed or a chair? No   Current Diet Well Balanced Diet   Dental Exam Not up to date   Eye Exam Not up to date   Exercise (times per week) 5 times per week   Current Exercises Include Yard Work   Current Exercise Activities Include -   Do you need help using the phone?  No   Are you deaf or do you have serious difficulty hearing?  No   Do you need help with transportation? No   Do you need help shopping? No   Do you need help preparing meals?  No   Do you need help with housework?  No   Do you need help with laundry? No   Do you need help taking your medications? No   Do you need help managing money? No   Do you ever drive or ride in a car without wearing a seat belt? No   Have you felt unusual stress, anger or loneliness in the last month? No   Who do you live with? Other   If you need help, do you have trouble finding someone available to you? No   Have you been bothered in the last four weeks by sexual problems? No   Do you have difficulty concentrating, remembering or making decisions? No         Does the patient have evidence of cognitive impairment? No    Asprin use counseling:Does not need ASA (and currently is not on it)    Age-appropriate Screening Schedule:  Refer to the list below for future screening recommendations based on patient's age, sex and/or medical conditions. Orders for these recommended tests are listed in the plan section. The patient has been provided with a written plan.    Health Maintenance   Topic Date Due   • ZOSTER VACCINE (3 of 3) 02/04/2020   • DXA SCAN  05/23/2020   • INFLUENZA VACCINE  08/01/2021   • LIPID PANEL  10/30/2021   • MAMMOGRAM  08/12/2022   • PAP SMEAR  03/22/2023   • TDAP/TD VACCINES (2 - Td or Tdap) 03/25/2029          The following portions of the patient's history were reviewed and updated as appropriate: allergies, current medications, past family history, past  medical history, past social history, past surgical history and problem list.    Outpatient Medications Prior to Visit   Medication Sig Dispense Refill   • bisoprolol (ZEBeta) 10 MG tablet TAKE ONE TABLET BY MOUTH EVERY NIGHT 90 tablet 3   • cetirizine (zyrTEC) 10 MG tablet Take 1 tablet by mouth Daily. 90 tablet 3   • colchicine 0.6 MG tablet Take 1 tablet by mouth Daily. 2 tabs now then 1 tab in 2 hours 30 tablet 0   • fesoterodine fumarate (Toviaz) 8 MG tablet sustained-release 24 hour tablet Take 1 tablet by mouth Daily. 90 tablet 5   • fluticasone (FLONASE) 50 MCG/ACT nasal spray USE 2 SPRAYS IN EACH NOSTRIL ONCE DAILY 48 g 3   • hydrALAZINE (APRESOLINE) 25 MG tablet Take 1 tablet by mouth 2 (two) times a day. 180 tablet 3   • losartan (COZAAR) 100 MG tablet Take 1 tablet by mouth Every Morning. 90 tablet 3   • Zoster Vac Recomb Adjuvanted (Shingrix) 50 MCG/0.5ML reconstituted suspension Inject 0.5 mL into the appropriate muscle as directed by prescriber See Admin Instructions. Repeat in 2-6 months 1 each 0     No facility-administered medications prior to visit.       Patient Active Problem List   Diagnosis   • Osteopenia   • Mixed hyperlipidemia   • Essential hypertension   • Chronic allergic rhinitis   • Encounter for screening mammogram for breast cancer   • Healthcare maintenance   • Encounter for immunization   • Skin lesion   • History of acute gouty arthritis   • Urge urinary incontinence   • Reactive depression       Advanced Care Planning:  ACP discussion was held with the patient during this visit. Patient does not have an advance directive, information provided.    Review of Systems   Constitutional: Positive for fatigue. Negative for appetite change, chills, fever and unexpected weight change.   HENT: Positive for congestion, postnasal drip, rhinorrhea, sinus pain and sneezing. Negative for ear pain and sore throat.    Eyes: Negative for visual disturbance.   Respiratory: Positive for cough.  "Negative for shortness of breath and wheezing.    Cardiovascular: Negative for chest pain, palpitations and leg swelling.   Gastrointestinal: Negative for abdominal pain, blood in stool, constipation, diarrhea, nausea and vomiting.   Genitourinary: Positive for frequency and urgency. Negative for dysuria and hematuria.   Musculoskeletal: Negative for arthralgias and back pain.   Skin: Negative for rash.        Lesion right index finger   Neurological: Negative for weakness, numbness and headaches.   Psychiatric/Behavioral: Positive for dysphoric mood. Negative for sleep disturbance. The patient is not nervous/anxious.      Compared to one year ago, the patient feels her physical health is the same.  Compared to one year ago, the patient feels her mental health is the same.    Reviewed chart for potential of high risk medication in the elderly: yes  Reviewed chart for potential of harmful drug interactions in the elderly:yes    Objective         Vitals:    07/09/21 1358   Pulse: 79   Temp: 97.1 °F (36.2 °C)   TempSrc: Temporal   SpO2: 94%   Weight: 90.3 kg (199 lb)   Height: 167.6 cm (65.98\")       Body mass index is 32.14 kg/m².  Discussed the patient's BMI with her. The BMI is above average; BMI management plan is completed.    Physical Exam  Constitutional:       General: She is not in acute distress.     Appearance: Normal appearance. She is well-developed. She is not diaphoretic.      Comments: Alert and in fair spirits. No apparent distress. No pallor, jaundice, diaphoresis, or cyanosis.   HENT:      Head: Atraumatic.      Right Ear: Tympanic membrane, ear canal and external ear normal.      Left Ear: Tympanic membrane, ear canal and external ear normal.   Eyes:      Conjunctiva/sclera: Conjunctivae normal.   Neck:      Thyroid: No thyroid mass or thyromegaly.      Vascular: No carotid bruit or JVD.      Trachea: Trachea normal. No tracheal deviation.   Cardiovascular:      Rate and Rhythm: Normal rate and " regular rhythm.      Heart sounds: Normal heart sounds, S1 normal and S2 normal. No murmur heard.   No gallop.    Pulmonary:      Effort: Pulmonary effort is normal.      Breath sounds: Normal breath sounds.   Abdominal:      General: Bowel sounds are normal. There is no distension or abdominal bruit.      Palpations: Abdomen is soft. There is no hepatomegaly, splenomegaly or mass.      Tenderness: There is no abdominal tenderness.      Hernia: No hernia is present.   Musculoskeletal:      Right lower leg: No edema.      Left lower leg: No edema.      Comments: No peripheral joint redness or warmth.   Lymphadenopathy:      Head:      Right side of head: No submental, submandibular, tonsillar, preauricular, posterior auricular or occipital adenopathy.      Left side of head: No submental, submandibular, tonsillar, preauricular, posterior auricular or occipital adenopathy.      Cervical: No cervical adenopathy.      Upper Body:      Right upper body: No supraclavicular adenopathy.      Left upper body: No supraclavicular adenopathy.   Skin:     General: Skin is warm.      Coloration: Skin is not cyanotic, jaundiced or pale.      Findings: No rash.      Nails: There is no clubbing.   Neurological:      Mental Status: She is alert and oriented to person, place, and time.      Cranial Nerves: No cranial nerve deficit.      Motor: No tremor.      Coordination: Coordination normal.      Gait: Gait normal.   Psychiatric:         Attention and Perception: Attention normal.         Mood and Affect: Mood is depressed (when discussing her mom's death however also smiled when appropriate).         Speech: Speech normal.         Behavior: Behavior normal.         Thought Content: Thought content normal. Thought content does not include suicidal ideation.       Assessment/Plan   Medicare Risks and Personalized Health Plan  CMS Preventative Services Quick Reference  Advance Directive Discussion  Breast Cancer/Mammogram  Screening  Depression/Dysphoria  Diabetic Lab Screening   Immunizations Discussed/Encouraged (specific immunizations; Influenza )  Obesity/Overweight   Osteoporosis Risk  Urinary Incontinence    The above risks/problems have been discussed with the patient.  Pertinent information has been shared with the patient in the After Visit Summary.  Follow up plans and orders are seen below in the Assessment/Plan Section.    Diagnoses and all orders for this visit:    1. Chronic allergic rhinitis   Reminded regarding allergen avoidance.  Reviewed options and agreed on a trial of montelukast. If doing well will consider discontinuing cetirizine  Encouraged to report if any worse, any new symptoms, or if no better over the next several weeks  -     montelukast (SINGULAIR) 10 MG tablet; Take 1 tablet by mouth Daily.  Dispense: 30 tablet; Refill: 5    2. Mixed hyperlipidemia  Encouraged to continue to work on her diet and exercise plan.  Updated labs will be drawn at her return.    3. Essential hypertension   Hypertension: BPs remain elevated here but are consistently at goal at home. Evidence of target organ damage: none.  Encouraged to continue to work on diet and exercise plan.   Continue current medication  Reminded to continue to do home blood pressure checks and to bring her monitor and log to next visit.    4. Urge urinary incontinence  Continue current medication  Encouraged to report if any worse or if any new symptoms or concerns.    5. Healthcare maintenance  Patient has received both doses of the COVID-19 vaccine.  Reminded to get a flu shot when available.  We will arrange an updated mammogram and DEXA scan  -     Mammo Screening Digital Tomosynthesis Bilateral With CAD; Future  -     DEXA Bone Density Axial; Future    6. Reactive depression  Significant situational component.   Supportive therapy.   Encouraged to report if any worse or if any new symptoms or concerns.      7. History of acute gouty  arthritis  Remains in remission  Encouraged to report if this should change.    Follow Up:  Return in about 4 months (around 11/9/2021) for Should be fasting at return.     An After Visit Summary and PPPS were given to the patient.

## 2021-07-09 NOTE — PROGRESS NOTES
Subjective   Rylee Hull is a 81 y.o. female.     Chief Complaint  She returns for a scheduled reassessment of multiple medical problems including    History of Present Illness     Depression  Her mother  several weeks ago.  Her daughter is currently being treated for breast cancer and her son  of an apparent suicide several years ago. She admits to intermittent depression, nervousness, difficulty sleeping, and fatigue. There has been no change in her appetite and she denies any suicidal ideation.  She will be moving back into her home along with her daughter next week and is looking forward to this.    Hypertension  Home blood pressure readings: have generally been at goal at home. Associated signs and symptoms: none.  She continues to deny any chest pain, palpitations, dyspnea, orthopnea, paroxysmal nocturnal dyspnea or peripheral edema. Current antihypertensive medications includes bisoprolol, losartan, and hydralazine.  She has yet to take her medication today    Dyslipidemia  Compliance with treatment remains good. The patient exercises daily. She is currently being prescribed the following medication for her dyslipidemia - lifestyle modifcation.      Gouty Arthritis  She denies any attacks since last here and has not required any colchicine.  She has a family history of gout.      Urinary Incontinence  She has a more then five year history of urinary frequency, nocturia x 1-2, urgency and occasional incontinence if she does not get to the bathroom quick enough. She denies any dysuria, hematuria, or incontinence with coughing, sneezing, lifting, pushing or pulling. She denies any fecal incontinence.  She continues to feel that fesoterodine has helped and denies any apparent side effects     The following portions of the patient's history were reviewed and updated as appropriate: allergies, current medications, past medical history, past social history and problem list.    Review of  Systems    Objective   Physical Exam    Assessment/Plan   Problems Addressed this Visit        Allergies and Adverse Reactions    Chronic allergic rhinitis - Primary       Cardiac and Vasculature    Essential hypertension    Mixed hyperlipidemia       Genitourinary and Reproductive     Urge urinary incontinence       Health Encounters    Healthcare maintenance       Mental Health    Reactive depression       Musculoskeletal and Injuries    History of acute gouty arthritis    Osteopenia      Diagnoses       Codes Comments    Chronic allergic rhinitis    -  Primary ICD-10-CM: J30.9  ICD-9-CM: 477.9     Mixed hyperlipidemia     ICD-10-CM: E78.2  ICD-9-CM: 272.2     Essential hypertension     ICD-10-CM: I10  ICD-9-CM: 401.9     Urge urinary incontinence     ICD-10-CM: N39.41  ICD-9-CM: 788.31     Healthcare maintenance     ICD-10-CM: Z00.00  ICD-9-CM: V70.0     Reactive depression     ICD-10-CM: F32.9  ICD-9-CM: 300.4     Osteopenia, unspecified location     ICD-10-CM: M85.80  ICD-9-CM: 733.90     History of acute gouty arthritis     ICD-10-CM: Z87.39  ICD-9-CM: V13.4

## 2021-07-09 NOTE — PATIENT INSTRUCTIONS
Advance Directive    Advance directives are legal documents that let you make choices ahead of time about your health care and medical treatment in case you become unable to communicate for yourself. Advance directives are a way for you to make known your wishes to family, friends, and health care providers. This can let others know about your end-of-life care if you become unable to communicate.  Discussing and writing advance directives should happen over time rather than all at once. Advance directives can be changed depending on your situation and what you want, even after you have signed the advance directives.  There are different types of advance directives, such as:  · Medical power of .  · Living will.  · Do not resuscitate (DNR) or do not attempt resuscitation (DNAR) order.  Health care proxy and medical power of   A health care proxy is also called a health care agent. This is a person who is appointed to make medical decisions for you in cases where you are unable to make the decisions yourself. Generally, people choose someone they know well and trust to represent their preferences. Make sure to ask this person for an agreement to act as your proxy. A proxy may have to exercise judgment in the event of a medical decision for which your wishes are not known.  A medical power of  is a legal document that names your health care proxy. Depending on the laws in your state, after the document is written, it may also need to be:  · Signed.  · Notarized.  · Dated.  · Copied.  · Witnessed.  · Incorporated into your medical record.  You may also want to appoint someone to manage your money in a situation in which you are unable to do so. This is called a durable power of  for finances. It is a separate legal document from the durable power of  for health care. You may choose the same person or someone different from your health care proxy to act as your agent in money  matters.  If you do not appoint a proxy, or if there is a concern that the proxy is not acting in your best interests, a court may appoint a guardian to act on your behalf.  Living will  A living will is a set of instructions that state your wishes about medical care when you cannot express them yourself. Health care providers should keep a copy of your living will in your medical record. You may want to give a copy to family members or friends. To alert caregivers in case of an emergency, you can place a card in your wallet to let them know that you have a living will and where they can find it. A living will is used if you become:  · Terminally ill.  · Disabled.  · Unable to communicate or make decisions.  Items to consider in your living will include:  · To use or not to use life-support equipment, such as dialysis machines and breathing machines (ventilators).  · A DNR or DNAR order. This tells health care providers not to use cardiopulmonary resuscitation (CPR) if breathing or heartbeat stops.  · To use or not to use tube feeding.  · To be given or not to be given food and fluids.  · Comfort (palliative) care when the goal becomes comfort rather than a cure.  · Donation of organs and tissues.  A living will does not give instructions for distributing your money and property if you should pass away.  DNR or DNAR  A DNR or DNAR order is a request not to have CPR in the event that your heart stops beating or you stop breathing. If a DNR or DNAR order has not been made and shared, a health care provider will try to help any patient whose heart has stopped or who has stopped breathing. If you plan to have surgery, talk with your health care provider about how your DNR or DNAR order will be followed if problems occur.  What if I do not have an advance directive?  If you do not have an advance directive, some states assign family decision makers to act on your behalf based on how closely you are related to them. Each  state has its own laws about advance directives. You may want to check with your health care provider, , or state representative about the laws in your state.  Summary  · Advance directives are the legal documents that allow you to make choices ahead of time about your health care and medical treatment in case you become unable to tell others about your care.  · The process of discussing and writing advance directives should happen over time. You can change the advance directives, even after you have signed them.  · Advance directives include DNR or DNAR orders, living crow, and designating an agent as your medical power of .  This information is not intended to replace advice given to you by your health care provider. Make sure you discuss any questions you have with your health care provider.  Document Revised: 01/28/2021 Document Reviewed: 07/16/2020  [employer] Patient Education © 2021 [employer]      Mammogram  A mammogram is a low energy X-ray of the breasts that is done to check for abnormal changes. This procedure can screen for and detect any changes that may indicate breast cancer. Mammograms are regularly done on women. A man may have a mammogram if he has a lump or swelling in his breast. A mammogram can also identify other changes and variations in the breast, such as:  · Inflammation of the breast tissue (mastitis).  · An infected area that contains a collection of pus (abscess).  · A fluid-filled sac (cyst).  · Fibrocystic changes. This is when breast tissue becomes denser, which can make the tissue feel rope-like or uneven under the skin.  · Tumors that are not cancerous (benign).  Tell a health care provider:  · About any allergies you have.  · If you have breast implants.  · If you have had previous breast disease, biopsy, or surgery.  · If you are breastfeeding.  · If you are younger than age 25.  · If you have a family history of breast cancer.  · Whether you are pregnant or may  be pregnant.  What are the risks?  Generally, this is a safe procedure. However, problems may occur, including:  · Exposure to radiation. Radiation levels are very low with this test.  · The results being misinterpreted.  · The need for further tests.  · The inability of the mammogram to detect certain cancers.  What happens before the procedure?  · Schedule your test about 1-2 weeks after your menstrual period if you are still menstruating. This is usually when your breasts are the least tender.  · If you have had a mammogram done at a different facility in the past, get the mammogram X-rays or have them sent to your current exam facility. The new and old images will be compared.  · Wash your breasts and underarms on the day of the test.  · Do not wear deodorants, perfumes, lotions, or powders anywhere on your body on the day of the test.  · Remove any jewelry from your neck.  · Wear clothes that you can change into and out of easily.  What happens during the procedure?    · You will undress from the waist up and put on a gown that opens in the front.  · You will  front of the X-ray machine.  · Each breast will be placed between two plastic or glass plates. The plates will compress your breast for a few seconds. Try to stay as relaxed as possible during the procedure. This does not cause any harm to your breasts and any discomfort you feel will be very brief.  · X-rays will be taken from different angles of each breast.  The procedure may vary among health care providers and hospitals.  What happens after the procedure?  · The mammogram will be examined by a specialist (radiologist).  · You may need to repeat certain parts of the test, depending on the quality of the images. This is commonly done if the radiologist needs a better view of the breast tissue.  · You may resume your normal activities.  · It is up to you to get the results of your procedure. Ask your health care provider, or the department that  is doing the procedure, when your results will be ready.  Summary  · A mammogram is a low energy X-ray of the breasts that is done to check for abnormal changes. A man may have a mammogram if he has a lump or swelling in his breast.  · If you have had a mammogram done at a different facility in the past, get the mammogram X-rays or have them sent to your current exam facility in order to compare them.  · Schedule your test about 1-2 weeks after your menstrual period if you are still menstruating.  · For this test, each breast will be placed between two plastic or glass plates. The plates will compress your breast for a few seconds.  · Ask when your test results will be ready. Make sure you get your test results.  This information is not intended to replace advice given to you by your health care provider. Make sure you discuss any questions you have with your health care provider.  Document Revised: 08/08/2019 Document Reviewed: 08/08/2019  [employer] Patient Education © 2021 [employer]      Fall Prevention in the Home, Adult  Falls can cause injuries. They can happen to people of all ages. There are many things you can do to make your home safe and to help prevent falls. Ask for help when making these changes, if needed.  What actions can I take to prevent falls?  General Instructions  · Use good lighting in all rooms. Replace any light bulbs that burn out.  · Turn on the lights when you go into a dark area. Use night-lights.  · Keep items that you use often in easy-to-reach places. Lower the shelves around your home if necessary.  · Set up your furniture so you have a clear path. Avoid moving your furniture around.  · Do not have throw rugs and other things on the floor that can make you trip.  · Avoid walking on wet floors.  · If any of your floors are uneven, fix them.  · Add color or contrast paint or tape to clearly jimmy and help you see:  ? Any grab bars or handrails.  ? First and last steps of  stairways.  ? Where the edge of each step is.  · If you use a stepladder:  ? Make sure that it is fully opened. Do not climb a closed stepladder.  ? Make sure that both sides of the stepladder are locked into place.  ? Ask someone to hold the stepladder for you while you use it.  · If there are any pets around you, be aware of where they are.  What can I do in the bathroom?         · Keep the floor dry. Clean up any water that spills onto the floor as soon as it happens.  · Remove soap buildup in the tub or shower regularly.  · Use non-skid mats or decals on the floor of the tub or shower.  · Attach bath mats securely with double-sided, non-slip rug tape.  · If you need to sit down in the shower, use a plastic, non-slip stool.  · Install grab bars by the toilet and in the tub and shower. Do not use towel bars as grab bars.  What can I do in the bedroom?  · Make sure that you have a light by your bed that is easy to reach.  · Do not use any sheets or blankets that are too big for your bed. They should not hang down onto the floor.  · Have a firm chair that has side arms. You can use this for support while you get dressed.  What can I do in the kitchen?  · Clean up any spills right away.  · If you need to reach something above you, use a strong step stool that has a grab bar.  · Keep electrical cords out of the way.  · Do not use floor polish or wax that makes floors slippery. If you must use wax, use non-skid floor wax.  What can I do with my stairs?  · Do not leave any items on the stairs.  · Make sure that you have a light switch at the top of the stairs and the bottom of the stairs. If you do not have them, ask someone to add them for you.  · Make sure that there are handrails on both sides of the stairs, and use them. Fix handrails that are broken or loose. Make sure that handrails are as long as the stairways.  · Install non-slip stair treads on all stairs in your home.  · Avoid having throw rugs at the top or  bottom of the stairs. If you do have throw rugs, attach them to the floor with carpet tape.  · Choose a carpet that does not hide the edge of the steps on the stairway.  · Check any carpeting to make sure that it is firmly attached to the stairs. Fix any carpet that is loose or worn.  What can I do on the outside of my home?  · Use bright outdoor lighting.  · Regularly fix the edges of walkways and driveways and fix any cracks.  · Remove anything that might make you trip as you walk through a door, such as a raised step or threshold.  · Trim any bushes or trees on the path to your home.  · Regularly check to see if handrails are loose or broken. Make sure that both sides of any steps have handrails.  · Install guardrails along the edges of any raised decks and porches.  · Clear walking paths of anything that might make someone trip, such as tools or rocks.  · Have any leaves, snow, or ice cleared regularly.  · Use sand or salt on walking paths during winter.  · Clean up any spills in your garage right away. This includes grease or oil spills.  What other actions can I take?  · Wear shoes that:  ? Have a low heel. Do not wear high heels.  ? Have rubber bottoms.  ? Are comfortable and fit you well.  ? Are closed at the toe. Do not wear open-toe sandals.  · Use tools that help you move around (mobility aids) if they are needed. These include:  ? Canes.  ? Walkers.  ? Scooters.  ? Crutches.  · Review your medicines with your doctor. Some medicines can make you feel dizzy. This can increase your chance of falling.  Ask your doctor what other things you can do to help prevent falls.  Where to find more information  · Centers for Disease Control and Prevention, [employer]: https://cdc.gov  · National [place] on Aging: https://tw4tknq.angi.nih.gov  Contact a doctor if:  · You are afraid of falling at home.  · You feel weak, drowsy, or dizzy at home.  · You fall at home.  Summary  · There are many simple things that you can do  to make your home safe and to help prevent falls.  · Ways to make your home safe include removing tripping hazards and installing grab bars in the bathroom.  · Ask for help when making these changes in your home.  This information is not intended to replace advice given to you by your health care provider. Make sure you discuss any questions you have with your health care provider.  Document Revised: 04/09/2020 Document Reviewed: 08/02/2018  [employer] Patient Education © 2021 [employer]      Osteoporosis    Osteoporosis happens when your bones get thin and weak. This can cause your bones to break (fracture) more easily. You can do things at home to make your bones stronger.  Follow these instructions at home:    Activity  · Exercise as told by your doctor. Ask your doctor what activities are safe for you. You should do:  ? Exercises that make your muscles work to hold your body weight up (weight-bearing exercises). These include ute chi, yoga, and walking.  ? Exercises to make your muscles stronger. One example is lifting weights.  Lifestyle  · Limit alcohol intake to no more than 1 drink a day for nonpregnant women and 2 drinks a day for men. One drink equals 12 oz of beer, 5 oz of wine, or 1½ oz of hard liquor.  · Do not use any products that have nicotine or tobacco in them. These include cigarettes and e-cigarettes. If you need help quitting, ask your doctor.  Preventing falls  · Use tools to help you move around (mobility aids) as needed. These include canes, walkers, scooters, and crutches.  · Keep rooms well-lit and free of clutter.  · Put away things that could make you trip. These include cords and rugs.  · Install safety rails on stairs. Install grab bars in bathrooms.  · Use rubber mats in slippery areas, like bathrooms.  · Wear shoes that:  ? Fit you well.  ? Support your feet.  ? Have closed toes.  ? Have rubber soles or low heels.  · Tell your doctor about all of the medicines you are taking. Some  medicines can make you more likely to fall.  General instructions  · Eat plenty of calcium and vitamin D. These nutrients are good for your bones. Good sources of calcium and vitamin D include:  ? Some fatty fish, such as salmon and tuna.  ? Foods that have calcium and vitamin D added to them (fortified foods). For example, some breakfast cereals are fortified with calcium and vitamin D.  ? Egg yolks.  ? Cheese.  ? Liver.  · Take over-the-counter and prescription medicines only as told by your doctor.  · Keep all follow-up visits as told by your doctor. This is important.  Contact a doctor if:  · You have not been tested (screened) for osteoporosis and you are:  ? A woman who is age 65 or older.  ? A man who is age 70 or older.  Get help right away if:  · You fall.  · You get hurt.  Summary  · Osteoporosis happens when your bones get thin and weak.  · Weak bones can break (fracture) more easily.  · Eat plenty of calcium and vitamin D. These nutrients are good for your bones.  · Tell your doctor about all of the medicines that you take.  This information is not intended to replace advice given to you by your health care provider. Make sure you discuss any questions you have with your health care provider.  Document Revised: 11/30/2018 Document Reviewed: 10/12/2018  [employer] Patient Education © 2021 [employer]

## 2021-07-10 VITALS
OXYGEN SATURATION: 94 % | WEIGHT: 199 LBS | DIASTOLIC BLOOD PRESSURE: 70 MMHG | RESPIRATION RATE: 14 BRPM | BODY MASS INDEX: 31.98 KG/M2 | HEART RATE: 79 BPM | SYSTOLIC BLOOD PRESSURE: 152 MMHG | HEIGHT: 66 IN | TEMPERATURE: 97.1 F

## 2021-07-20 DIAGNOSIS — I10 ESSENTIAL HYPERTENSION: ICD-10-CM

## 2021-07-20 DIAGNOSIS — Z87.39 HISTORY OF ACUTE GOUTY ARTHRITIS: ICD-10-CM

## 2021-07-20 RX ORDER — LOSARTAN POTASSIUM 100 MG/1
TABLET ORAL
Qty: 90 TABLET | Refills: 3 | Status: SHIPPED | OUTPATIENT
Start: 2021-07-20 | End: 2022-06-22

## 2021-09-13 ENCOUNTER — HOSPITAL ENCOUNTER (OUTPATIENT)
Dept: BONE DENSITY | Facility: HOSPITAL | Age: 81
Discharge: HOME | End: 2021-09-13

## 2021-09-13 ENCOUNTER — HOSPITAL ENCOUNTER (OUTPATIENT)
Dept: MAMMOGRAPHY | Facility: HOSPITAL | Age: 81
Discharge: HOME | End: 2021-09-13

## 2021-09-13 DIAGNOSIS — M81.0 AGE-RELATED OSTEOPOROSIS WITHOUT CURRENT PATHOLOGICAL FRACTURE: ICD-10-CM

## 2021-09-13 DIAGNOSIS — Z12.31 ENCOUNTER FOR SCREENING MAMMOGRAM FOR BREAST CANCER: ICD-10-CM

## 2021-09-13 DIAGNOSIS — Z00.00 HEALTHCARE MAINTENANCE: ICD-10-CM

## 2021-09-13 DIAGNOSIS — M85.80 OSTEOPENIA, UNSPECIFIED LOCATION: ICD-10-CM

## 2021-09-13 PROCEDURE — 77063 BREAST TOMOSYNTHESIS BI: CPT

## 2021-09-13 PROCEDURE — 77067 SCR MAMMO BI INCL CAD: CPT

## 2021-09-13 PROCEDURE — 77063 BREAST TOMOSYNTHESIS BI: CPT | Performed by: RADIOLOGY

## 2021-09-13 PROCEDURE — 77080 DXA BONE DENSITY AXIAL: CPT | Performed by: RADIOLOGY

## 2021-09-13 PROCEDURE — 77080 DXA BONE DENSITY AXIAL: CPT

## 2021-09-13 PROCEDURE — 77067 SCR MAMMO BI INCL CAD: CPT | Performed by: RADIOLOGY

## 2021-10-13 DIAGNOSIS — I10 ESSENTIAL HYPERTENSION: ICD-10-CM

## 2021-10-13 RX ORDER — HYDRALAZINE HYDROCHLORIDE 25 MG/1
TABLET, FILM COATED ORAL
Qty: 180 TABLET | Refills: 3 | Status: SHIPPED | OUTPATIENT
Start: 2021-10-13 | End: 2022-08-26 | Stop reason: SDUPTHER

## 2021-11-05 ENCOUNTER — OFFICE VISIT (OUTPATIENT)
Dept: FAMILY MEDICINE CLINIC | Facility: CLINIC | Age: 81
End: 2021-11-05

## 2021-11-05 VITALS
WEIGHT: 198 LBS | DIASTOLIC BLOOD PRESSURE: 72 MMHG | RESPIRATION RATE: 14 BRPM | OXYGEN SATURATION: 99 % | SYSTOLIC BLOOD PRESSURE: 139 MMHG | TEMPERATURE: 97.2 F | HEIGHT: 66 IN | HEART RATE: 80 BPM | BODY MASS INDEX: 31.82 KG/M2

## 2021-11-05 DIAGNOSIS — J30.9 CHRONIC ALLERGIC RHINITIS: Primary | ICD-10-CM

## 2021-11-05 DIAGNOSIS — E78.2 MIXED HYPERLIPIDEMIA: ICD-10-CM

## 2021-11-05 DIAGNOSIS — M85.80 OSTEOPENIA, UNSPECIFIED LOCATION: ICD-10-CM

## 2021-11-05 DIAGNOSIS — F32.9 REACTIVE DEPRESSION: ICD-10-CM

## 2021-11-05 DIAGNOSIS — N39.41 URGE URINARY INCONTINENCE: ICD-10-CM

## 2021-11-05 DIAGNOSIS — Z87.39 HISTORY OF ACUTE GOUTY ARTHRITIS: ICD-10-CM

## 2021-11-05 DIAGNOSIS — I10 ESSENTIAL HYPERTENSION: ICD-10-CM

## 2021-11-05 DIAGNOSIS — Z00.00 HEALTHCARE MAINTENANCE: ICD-10-CM

## 2021-11-05 LAB
25(OH)D3 SERPL-MCNC: 47.4 NG/ML (ref 30–100)
ALBUMIN SERPL-MCNC: 4.7 G/DL (ref 3.5–5.2)
ALBUMIN/GLOB SERPL: 1.7 G/DL
ALP SERPL-CCNC: 88 U/L (ref 39–117)
ALT SERPL W P-5'-P-CCNC: 21 U/L (ref 1–33)
ANION GAP SERPL CALCULATED.3IONS-SCNC: 11.7 MMOL/L (ref 5–15)
AST SERPL-CCNC: 27 U/L (ref 1–32)
BASOPHILS # BLD AUTO: 0.04 10*3/MM3 (ref 0–0.2)
BASOPHILS NFR BLD AUTO: 0.6 % (ref 0–1.5)
BILIRUB SERPL-MCNC: 0.4 MG/DL (ref 0–1.2)
BUN SERPL-MCNC: 13 MG/DL (ref 8–23)
BUN/CREAT SERPL: 18.3 (ref 7–25)
CALCIUM SPEC-SCNC: 9.1 MG/DL (ref 8.6–10.5)
CHLORIDE SERPL-SCNC: 102 MMOL/L (ref 98–107)
CHOLEST SERPL-MCNC: 192 MG/DL (ref 0–200)
CO2 SERPL-SCNC: 26.3 MMOL/L (ref 22–29)
CREAT SERPL-MCNC: 0.71 MG/DL (ref 0.57–1)
DEPRECATED RDW RBC AUTO: 41.5 FL (ref 37–54)
EOSINOPHIL # BLD AUTO: 0.17 10*3/MM3 (ref 0–0.4)
EOSINOPHIL NFR BLD AUTO: 2.4 % (ref 0.3–6.2)
ERYTHROCYTE [DISTWIDTH] IN BLOOD BY AUTOMATED COUNT: 13.1 % (ref 12.3–15.4)
GFR SERPL CREATININE-BSD FRML MDRD: 79 ML/MIN/1.73
GLOBULIN UR ELPH-MCNC: 2.7 GM/DL
GLUCOSE SERPL-MCNC: 93 MG/DL (ref 65–99)
HCT VFR BLD AUTO: 42.1 % (ref 34–46.6)
HDLC SERPL-MCNC: 65 MG/DL (ref 40–60)
HGB BLD-MCNC: 14.2 G/DL (ref 12–15.9)
IMM GRANULOCYTES # BLD AUTO: 0.02 10*3/MM3 (ref 0–0.05)
IMM GRANULOCYTES NFR BLD AUTO: 0.3 % (ref 0–0.5)
LDLC SERPL CALC-MCNC: 104 MG/DL (ref 0–100)
LDLC/HDLC SERPL: 1.55 {RATIO}
LYMPHOCYTES # BLD AUTO: 2.12 10*3/MM3 (ref 0.7–3.1)
LYMPHOCYTES NFR BLD AUTO: 29.3 % (ref 19.6–45.3)
MCH RBC QN AUTO: 29.7 PG (ref 26.6–33)
MCHC RBC AUTO-ENTMCNC: 33.7 G/DL (ref 31.5–35.7)
MCV RBC AUTO: 88.1 FL (ref 79–97)
MONOCYTES # BLD AUTO: 0.44 10*3/MM3 (ref 0.1–0.9)
MONOCYTES NFR BLD AUTO: 6.1 % (ref 5–12)
NEUTROPHILS NFR BLD AUTO: 4.44 10*3/MM3 (ref 1.7–7)
NEUTROPHILS NFR BLD AUTO: 61.3 % (ref 42.7–76)
NRBC BLD AUTO-RTO: 0 /100 WBC (ref 0–0.2)
PLATELET # BLD AUTO: 340 10*3/MM3 (ref 140–450)
PMV BLD AUTO: 9.2 FL (ref 6–12)
POTASSIUM SERPL-SCNC: 4.2 MMOL/L (ref 3.5–5.2)
PROT SERPL-MCNC: 7.4 G/DL (ref 6–8.5)
RBC # BLD AUTO: 4.78 10*6/MM3 (ref 3.77–5.28)
SODIUM SERPL-SCNC: 140 MMOL/L (ref 136–145)
TRIGL SERPL-MCNC: 131 MG/DL (ref 0–150)
TSH SERPL DL<=0.05 MIU/L-ACNC: 0.94 UIU/ML (ref 0.27–4.2)
URATE SERPL-MCNC: 5.8 MG/DL (ref 2.4–5.7)
VLDLC SERPL-MCNC: 23 MG/DL (ref 5–40)
WBC # BLD AUTO: 7.23 10*3/MM3 (ref 3.4–10.8)

## 2021-11-05 PROCEDURE — 85025 COMPLETE CBC W/AUTO DIFF WBC: CPT | Performed by: GENERAL PRACTICE

## 2021-11-05 PROCEDURE — 99214 OFFICE O/P EST MOD 30 MIN: CPT | Performed by: GENERAL PRACTICE

## 2021-11-05 PROCEDURE — 80053 COMPREHEN METABOLIC PANEL: CPT | Performed by: GENERAL PRACTICE

## 2021-11-05 PROCEDURE — 84443 ASSAY THYROID STIM HORMONE: CPT | Performed by: GENERAL PRACTICE

## 2021-11-05 PROCEDURE — 82306 VITAMIN D 25 HYDROXY: CPT | Performed by: GENERAL PRACTICE

## 2021-11-05 PROCEDURE — 80061 LIPID PANEL: CPT | Performed by: GENERAL PRACTICE

## 2021-11-05 PROCEDURE — 84550 ASSAY OF BLOOD/URIC ACID: CPT | Performed by: GENERAL PRACTICE

## 2021-11-05 RX ORDER — MONTELUKAST SODIUM 10 MG/1
10 TABLET ORAL DAILY
Qty: 90 TABLET | Refills: 3 | Status: SHIPPED | OUTPATIENT
Start: 2021-11-05 | End: 2021-12-08

## 2021-11-05 NOTE — PROGRESS NOTES
Subjective   Rylee Hull is a 81 y.o. female.     Chief Complaint  She returns for a scheduled reassessment of multiple medical problems including essential hypertension, hyperlipidemia, previous gout, and depression    History of Present Illness     Hypertension  Home blood pressure readings: have generally been at or near goal. Associated signs and symptoms: none.  She continues to deny any chest pain, palpitations, dyspnea, orthopnea, paroxysmal nocturnal dyspnea or peripheral edema. Current antihypertensive medications includes bisoprolol, losartan, and hydralazine.      Dyslipidemia  Compliance with treatment remains good.  She exercises some daily. She is currently being prescribed the following medication for her dyslipidemia - lifestyle modifcation. She has had no recent labs but is fasting this morning    Gouty Arthritis  She denies any attacks since last here and has not required any colchicine.  She has a family history of gout.      Urinary Incontinence  She has a more then five year history of urinary frequency, nocturia x 1-2, urgency and occasional incontinence if she does not get to the bathroom quick enough. She continues to deny any dysuria, hematuria, or incontinence with coughing, sneezing, lifting, pushing or pulling.  There is no history of any fecal incontinence.  She continues to feel that fesoterodine has helped and denies any apparent side effects     Chronic Allergic Rhinitis   She has a long history of intermittent rhinorrhea, nasal congestion, and postnasal drip. When severe these symptoms are associated with a dry cough. There is no history of any other upper respiratory tract symptoms and she continues to deny any chest pain, shortness of breath, hemoptysis, fever, or chills. She is currently on nasal fluticasone, cetirizine, and montelukast and feels all 3 helps some     Depression  Her mother  early this year.  Her daughter has been undergoing treatment for breast cancer  and her son  of an apparent suicide several years ago. She continues to experience intermittent depression, nervousness, difficulty sleeping, and fatigue. There has been no change in her appetite and she denies any suicidal ideation.      Imaging  DEXA performed on 21 returned with a T score as low as - 1.5    The following portions of the patient's history were reviewed and updated as appropriate: allergies, current medications, past medical history, past social history and problem list.    Review of Systems   Constitutional: Positive for fatigue. Negative for appetite change, chills, fever and unexpected weight change.   HENT: Positive for congestion, rhinorrhea and sneezing. Negative for ear pain, postnasal drip, sinus pain and sore throat.    Eyes: Negative for visual disturbance.   Respiratory: Negative for cough, shortness of breath and wheezing.    Cardiovascular: Negative for chest pain, palpitations and leg swelling.   Gastrointestinal: Negative for abdominal pain, blood in stool, constipation, diarrhea, nausea and vomiting.   Genitourinary: Positive for frequency and urgency. Negative for dysuria and hematuria.   Musculoskeletal: Negative for arthralgias and back pain.   Skin: Negative for rash.   Neurological: Negative for weakness, numbness and headaches.   Psychiatric/Behavioral: Positive for dysphoric mood. Negative for sleep disturbance. The patient is not nervous/anxious.      Objective   Physical Exam  Constitutional:       General: She is not in acute distress.     Appearance: Normal appearance. She is well-developed. She is not diaphoretic.      Comments: Alert and in fair spirits. No apparent distress. No pallor, jaundice, diaphoresis, or cyanosis.   HENT:      Head: Atraumatic.      Right Ear: Tympanic membrane, ear canal and external ear normal.      Left Ear: Tympanic membrane, ear canal and external ear normal.   Eyes:      Conjunctiva/sclera: Conjunctivae normal.   Neck:       Thyroid: No thyroid mass or thyromegaly.      Vascular: No carotid bruit or JVD.      Trachea: Trachea normal. No tracheal deviation.   Cardiovascular:      Rate and Rhythm: Normal rate and regular rhythm.      Heart sounds: Normal heart sounds, S1 normal and S2 normal. No murmur heard.  No gallop.    Pulmonary:      Effort: Pulmonary effort is normal.      Breath sounds: Normal breath sounds.   Chest:   Breasts:      Right: No supraclavicular adenopathy.      Left: No supraclavicular adenopathy.       Abdominal:      General: Bowel sounds are normal. There is no distension.   Musculoskeletal:      Right lower leg: No edema.      Left lower leg: No edema.      Comments: No peripheral joint redness or warmth.   Lymphadenopathy:      Head:      Right side of head: No submental, submandibular, tonsillar, preauricular, posterior auricular or occipital adenopathy.      Left side of head: No submental, submandibular, tonsillar, preauricular, posterior auricular or occipital adenopathy.      Cervical: No cervical adenopathy.      Upper Body:      Right upper body: No supraclavicular adenopathy.      Left upper body: No supraclavicular adenopathy.   Skin:     General: Skin is warm.      Coloration: Skin is not cyanotic, jaundiced or pale.      Findings: No rash.      Nails: There is no clubbing.   Neurological:      Mental Status: She is alert and oriented to person, place, and time.      Cranial Nerves: No cranial nerve deficit.      Motor: No tremor.      Coordination: Coordination normal.      Gait: Gait normal.   Psychiatric:         Attention and Perception: Attention normal.         Mood and Affect: Mood normal.         Speech: Speech normal.         Behavior: Behavior normal.         Thought Content: Thought content normal. Thought content does not include suicidal ideation.       Assessment/Plan   Problems Addressed this Visit        Allergies and Adverse Reactions    Chronic allergic rhinitis   Reminded regarding  allergen avoidance.  Continue current medication    Relevant Medications    montelukast (SINGULAIR) 10 MG tablet       Cardiac and Vasculature    Essential hypertension   Hypertension: marginal. Evidence of target organ damage: none.  Encouraged to continue to work on diet and exercise plan.   Continue current medication for now  If elevated at return will titrate hydralazine further    Relevant Orders    CBC & Differential    Comprehensive Metabolic Panel    Mixed hyperlipidemia  As above.  Updated labs drawn.    Relevant Orders    Comprehensive Metabolic Panel    Lipid Panel    TSH       Genitourinary and Reproductive     Urge urinary incontinence       Health Encounters    Healthcare maintenance  Patient has already received a flu shot fall.  Recommended a third dose of the Moderna COVID-19 vaccine.       Mental Health    Reactive depression  Significant situational component.   Supportive therapy.   Encouraged to report if any worse or if any new symptoms or concerns.    Relevant Orders    TSH       Musculoskeletal and Injuries    History of acute gouty arthritis    Relevant Orders    Uric Acid    Osteopenia  Encouraged to continue to pursue weight bearing activities while exercising joint protection.    Relevant Orders    Vitamin D 25 Hydroxy      Diagnoses       Codes Comments    Chronic allergic rhinitis    -  Primary ICD-10-CM: J30.9  ICD-9-CM: 477.9     Essential hypertension     ICD-10-CM: I10  ICD-9-CM: 401.9     Mixed hyperlipidemia     ICD-10-CM: E78.2  ICD-9-CM: 272.2     Urge urinary incontinence     ICD-10-CM: N39.41  ICD-9-CM: 788.31     Healthcare maintenance     ICD-10-CM: Z00.00  ICD-9-CM: V70.0     Reactive depression     ICD-10-CM: F32.9  ICD-9-CM: 300.4     History of acute gouty arthritis     ICD-10-CM: Z87.39  ICD-9-CM: V13.4     Osteopenia, unspecified location     ICD-10-CM: M85.80  ICD-9-CM: 733.90

## 2021-12-08 DIAGNOSIS — J30.9 CHRONIC ALLERGIC RHINITIS: ICD-10-CM

## 2021-12-08 RX ORDER — MONTELUKAST SODIUM 10 MG/1
10 TABLET ORAL DAILY
Qty: 30 TABLET | Refills: 5 | Status: SHIPPED | OUTPATIENT
Start: 2021-12-08 | End: 2022-08-27 | Stop reason: SDUPTHER

## 2022-01-05 DIAGNOSIS — N39.41 URGE URINARY INCONTINENCE: ICD-10-CM

## 2022-01-05 DIAGNOSIS — I10 ESSENTIAL HYPERTENSION: ICD-10-CM

## 2022-01-05 RX ORDER — BISOPROLOL FUMARATE 10 MG/1
TABLET, FILM COATED ORAL
Qty: 60 TABLET | Refills: 3 | Status: SHIPPED | OUTPATIENT
Start: 2022-01-05 | End: 2022-08-27 | Stop reason: SDUPTHER

## 2022-01-05 RX ORDER — FESOTERODINE FUMARATE 8 MG/1
8 TABLET, FILM COATED, EXTENDED RELEASE ORAL DAILY
Qty: 90 TABLET | Refills: 5 | Status: SHIPPED | OUTPATIENT
Start: 2022-01-05 | End: 2022-08-27 | Stop reason: SDUPTHER

## 2022-03-08 ENCOUNTER — OFFICE VISIT (OUTPATIENT)
Dept: FAMILY MEDICINE CLINIC | Facility: CLINIC | Age: 82
End: 2022-03-08

## 2022-03-08 DIAGNOSIS — Z00.00 HEALTHCARE MAINTENANCE: ICD-10-CM

## 2022-03-08 DIAGNOSIS — M85.80 OSTEOPENIA, UNSPECIFIED LOCATION: ICD-10-CM

## 2022-03-08 DIAGNOSIS — I10 ESSENTIAL HYPERTENSION: ICD-10-CM

## 2022-03-08 DIAGNOSIS — J30.9 CHRONIC ALLERGIC RHINITIS: Primary | ICD-10-CM

## 2022-03-08 DIAGNOSIS — N39.41 URGE URINARY INCONTINENCE: ICD-10-CM

## 2022-03-08 DIAGNOSIS — F32.9 REACTIVE DEPRESSION: ICD-10-CM

## 2022-03-08 DIAGNOSIS — Z87.39 HISTORY OF ACUTE GOUTY ARTHRITIS: ICD-10-CM

## 2022-03-08 DIAGNOSIS — E78.2 MIXED HYPERLIPIDEMIA: ICD-10-CM

## 2022-03-08 PROCEDURE — 99214 OFFICE O/P EST MOD 30 MIN: CPT | Performed by: GENERAL PRACTICE

## 2022-03-08 NOTE — PROGRESS NOTES
Subjective   Rylee Hull is a 81 y.o. female.     Chief Complaint  She returns for a scheduled reassessment of multiple medical problems including essential hypertension, hyperlipidemia, previous gout, and urge urinary incontinence    History of Present Illness     Hypertension  Home blood pressure readings: have remained at or near goal. Associated signs and symptoms: none.  She continues to deny any chest pain, palpitations, dyspnea, orthopnea, paroxysmal nocturnal dyspnea or peripheral edema. Current antihypertensive medications includes bisoprolol, losartan, and hydralazine.    Lab Results   Component Value Date    GLUCOSE 93 11/05/2021    BUN 13 11/05/2021    CREATININE 0.71 11/05/2021    EGFRIFNONA 79 11/05/2021    EGFRIFAFRI  09/07/2016      Comment:      <15 Indicative of kidney failure.    BCR 18.3 11/05/2021    K 4.2 11/05/2021    CO2 26.3 11/05/2021    CALCIUM 9.1 11/05/2021    ALBUMIN 4.70 11/05/2021    LABIL2 1.6 09/01/2015    AST 27 11/05/2021    ALT 21 11/05/2021     Lab Results   Component Value Date    ALKPHOS 88 11/05/2021     Dyslipidemia  Compliance with treatment remains good.  She exercises some daily. She is currently being prescribed the following medication for her dyslipidemia - lifestyle modifcation.  Lab Results   Component Value Date    CHOL 192 11/05/2021    CHLPL 204 (H) 09/01/2015    TRIG 131 11/05/2021    HDL 65 (H) 11/05/2021     (H) 11/05/2021     Gouty Arthritis  She denies any attacks since last here and has not required any colchicine.  She has a family history of gout.    Lab Results   Component Value Date    URICACID 5.8 (H) 11/05/2021     Urinary Incontinence  She has a long history of urinary frequency, nocturia x 1-2, urgency and occasional incontinence if she does not get to the bathroom quick enough. She continues to deny any dysuria, hematuria, or incontinence with coughing, sneezing, lifting, pushing or pulling.  There is no history of any fecal  incontinence.  She continues to feel that fesoterodine has helped and has not experienced any apparent side effects     Chronic Allergic Rhinitis   She has a long history of intermittent rhinorrhea, nasal congestion, and postnasal drip. When severe these symptoms are associated with a dry cough. There is no history of any other upper respiratory tract symptoms and she continues to deny any chest pain, shortness of breath, hemoptysis, fever, or chills. She remains on nasal fluticasone, cetirizine, and montelukast and feels all 3 helps some     Depression  Her mother  early last year.  Her daughter has undergone treatment for breast cancer and her son  of an apparent suicide several years ago. She continues to experience intermittent depression, nervousness, difficulty sleeping, and fatigue. There has been no change in her appetite and she continues to deny any suicidal ideation.      Labs  Most recent vitamin D 47.4  Lab Results   Component Value Date    WBC 7.23 2021    HGB 14.2 2021    HCT 42.1 2021    MCV 88.1 2021     2021     Lab Results   Component Value Date    TSH 0.936 2021     The following portions of the patient's history were reviewed and updated as appropriate: allergies, current medications, past medical history, past social history and problem list.    Review of Systems   Constitutional: Positive for fatigue. Negative for appetite change, chills, fever and unexpected weight change.   HENT: Positive for rhinorrhea and sneezing. Negative for congestion, ear pain, postnasal drip, sinus pain and sore throat.    Eyes: Negative for visual disturbance.   Respiratory: Negative for cough, shortness of breath and wheezing.    Cardiovascular: Negative for chest pain, palpitations and leg swelling.   Gastrointestinal: Negative for abdominal pain, blood in stool, constipation, diarrhea, nausea and vomiting.   Genitourinary: Positive for frequency and urgency.  Negative for dysuria and hematuria.   Musculoskeletal: Negative for arthralgias and back pain.   Skin: Negative for rash.   Neurological: Negative for weakness, numbness and headaches.   Psychiatric/Behavioral: Positive for dysphoric mood. Negative for sleep disturbance. The patient is not nervous/anxious.      Objective   Physical Exam  Constitutional:       General: She is not in acute distress.     Appearance: Normal appearance. She is well-developed. She is not diaphoretic.      Comments: Bright and in good spirits. No apparent distress. No pallor, jaundice, diaphoresis, or cyanosis.   HENT:      Head: Atraumatic.      Right Ear: Tympanic membrane, ear canal and external ear normal.      Left Ear: Tympanic membrane, ear canal and external ear normal.   Eyes:      Conjunctiva/sclera: Conjunctivae normal.   Neck:      Thyroid: No thyroid mass or thyromegaly.      Vascular: No carotid bruit or JVD.      Trachea: Trachea normal. No tracheal deviation.   Cardiovascular:      Rate and Rhythm: Normal rate and regular rhythm.      Heart sounds: Normal heart sounds, S1 normal and S2 normal. No murmur heard.    No gallop.   Pulmonary:      Effort: Pulmonary effort is normal.      Breath sounds: Normal breath sounds.   Chest:   Breasts:      Right: No supraclavicular adenopathy.      Left: No supraclavicular adenopathy.       Abdominal:      General: Bowel sounds are normal. There is no distension.   Musculoskeletal:      Right lower leg: No edema.      Left lower leg: No edema.      Comments: No peripheral joint redness or warmth.   Lymphadenopathy:      Head:      Right side of head: No submental, submandibular, tonsillar, preauricular, posterior auricular or occipital adenopathy.      Left side of head: No submental, submandibular, tonsillar, preauricular, posterior auricular or occipital adenopathy.      Cervical: No cervical adenopathy.      Upper Body:      Right upper body: No supraclavicular adenopathy.      Left  upper body: No supraclavicular adenopathy.   Skin:     General: Skin is warm.      Coloration: Skin is not cyanotic, jaundiced or pale.      Findings: No rash.      Nails: There is no clubbing.   Neurological:      Mental Status: She is alert and oriented to person, place, and time.      Cranial Nerves: No cranial nerve deficit.      Motor: No tremor.      Coordination: Coordination normal.      Gait: Gait normal.   Psychiatric:         Attention and Perception: Attention normal.         Mood and Affect: Mood normal.         Speech: Speech normal.         Behavior: Behavior normal.         Thought Content: Thought content normal. Thought content does not include suicidal ideation.       Assessment/Plan   Problems Addressed this Visit        Allergies and Adverse Reactions    Chronic allergic rhinitis   Reminded regarding allergen avoidance.  Continue current medication       Cardiac and Vasculature    Essential hypertension   Hypertension: marginal. Evidence of target organ damage: none.  Encouraged to continue to work on diet and exercise plan.   Continue current medication for now  Reminded to continue to do home blood pressure checks and to bring log to next visit.    Mixed hyperlipidemia  As above.  Will continue to monitor       Genitourinary and Reproductive     Urge urinary incontinence  Continue current medication  Encouraged to report if any worse or if any new symptoms or concerns.       Health Encounters    Healthcare maintenance  We will arrange an updated mammogram at her return       Mental Health    Reactive depression  Significant situational component.   Supportive therapy.   Encouraged to report if any worse or if any new symptoms or concerns.       Musculoskeletal and Injuries    History of acute gouty arthritis  Remains in remission  Encouraged to report if this should change.    Osteopenia      Diagnoses       Codes Comments    Chronic allergic rhinitis    -  Primary ICD-10-CM: J30.9  ICD-9-CM:  477.9     Essential hypertension     ICD-10-CM: I10  ICD-9-CM: 401.9     Mixed hyperlipidemia     ICD-10-CM: E78.2  ICD-9-CM: 272.2     Urge urinary incontinence     ICD-10-CM: N39.41  ICD-9-CM: 788.31     Healthcare maintenance     ICD-10-CM: Z00.00  ICD-9-CM: V70.0     Reactive depression     ICD-10-CM: F32.9  ICD-9-CM: 300.4     History of acute gouty arthritis     ICD-10-CM: Z87.39  ICD-9-CM: V13.4     Osteopenia, unspecified location     ICD-10-CM: M85.80  ICD-9-CM: 733.90

## 2022-03-09 VITALS
TEMPERATURE: 98.2 F | HEIGHT: 66 IN | HEART RATE: 78 BPM | DIASTOLIC BLOOD PRESSURE: 72 MMHG | RESPIRATION RATE: 14 BRPM | BODY MASS INDEX: 32.47 KG/M2 | SYSTOLIC BLOOD PRESSURE: 140 MMHG | OXYGEN SATURATION: 91 % | WEIGHT: 202 LBS

## 2022-06-22 DIAGNOSIS — I10 ESSENTIAL HYPERTENSION: ICD-10-CM

## 2022-06-22 DIAGNOSIS — Z87.39 HISTORY OF ACUTE GOUTY ARTHRITIS: ICD-10-CM

## 2022-06-22 RX ORDER — LOSARTAN POTASSIUM 100 MG/1
TABLET ORAL
Qty: 90 TABLET | Refills: 3 | Status: SHIPPED | OUTPATIENT
Start: 2022-06-22 | End: 2022-08-27 | Stop reason: SDUPTHER

## 2022-08-26 ENCOUNTER — OFFICE VISIT (OUTPATIENT)
Dept: FAMILY MEDICINE CLINIC | Facility: CLINIC | Age: 82
End: 2022-08-26

## 2022-08-26 DIAGNOSIS — F32.9 REACTIVE DEPRESSION: ICD-10-CM

## 2022-08-26 DIAGNOSIS — M85.80 OSTEOPENIA, UNSPECIFIED LOCATION: ICD-10-CM

## 2022-08-26 DIAGNOSIS — Z87.39 HISTORY OF ACUTE GOUTY ARTHRITIS: ICD-10-CM

## 2022-08-26 DIAGNOSIS — N39.41 URGE URINARY INCONTINENCE: ICD-10-CM

## 2022-08-26 DIAGNOSIS — Z00.00 HEALTHCARE MAINTENANCE: ICD-10-CM

## 2022-08-26 DIAGNOSIS — J30.9 CHRONIC ALLERGIC RHINITIS: Primary | ICD-10-CM

## 2022-08-26 DIAGNOSIS — I10 ESSENTIAL HYPERTENSION: ICD-10-CM

## 2022-08-26 DIAGNOSIS — E78.2 MIXED HYPERLIPIDEMIA: ICD-10-CM

## 2022-08-26 PROCEDURE — G0439 PPPS, SUBSEQ VISIT: HCPCS | Performed by: GENERAL PRACTICE

## 2022-08-26 PROCEDURE — 1170F FXNL STATUS ASSESSED: CPT | Performed by: GENERAL PRACTICE

## 2022-08-26 PROCEDURE — 1160F RVW MEDS BY RX/DR IN RCRD: CPT | Performed by: GENERAL PRACTICE

## 2022-08-26 RX ORDER — HYDRALAZINE HYDROCHLORIDE 50 MG/1
50 TABLET, FILM COATED ORAL 2 TIMES DAILY
Qty: 180 TABLET | Refills: 3 | Status: SHIPPED | OUTPATIENT
Start: 2022-08-26 | End: 2022-09-15 | Stop reason: SDUPTHER

## 2022-08-26 NOTE — PROGRESS NOTES
The ABCs of the Annual Wellness Visit  Subsequent Medicare Wellness Visit    Chief Complaint  Subsequent Medicare Wellness Visit    Subjective    History of Present Illness:  Rylee Hull is a 82 y.o. female who presents for a Subsequent Medicare Wellness Visit.    Essential hypertension  Home blood pressure readings: have generally been at or just above goal. She continues to deny any chest pain, palpitations, dyspnea, orthopnea, paroxysmal nocturnal dyspnea or peripheral edema. Current antihypertensive medications includes bisoprolol, losartan, and hydralazine.      Hyperlipidemia  Her compliance with treatment remains good.  She exercises some daily. She is currently being prescribed the following medication for her dyslipidemia - lifestyle modifcation.    Gouty Arthritis  She denies any attacks since last here and has not required any colchicine.  She has a family history of gout.      Urinary Incontinence  She has a long history of urinary frequency, nocturia x 1-2, urgency and occasional incontinence if she does not get to the bathroom quick enough. She continues to deny any dysuria, hematuria, or incontinence with coughing, sneezing, lifting, pushing or pulling.  There is no history of any fecal incontinence.  She continues to feel that fesoterodine has helped and has not experienced any apparent side effects     Chronic Allergic Rhinitis   She has a long history of intermittent rhinorrhea, nasal congestion, and postnasal drip. When severe these symptoms are associated with a dry cough. There is no history of any other upper respiratory tract symptoms and she continues to deny any chest pain, shortness of breath, hemoptysis, fever, or chills. She remains on nasal fluticasone, cetirizine, and montelukast and feels all three help some     Depression  Her mother  early last year.  Her daughter has undergone treatment for breast cancer and her son  of an apparent suicide several years ago. She  continues to experience intermittent depression, nervousness, difficulty sleeping, and fatigue. There has been no change in her appetite and she continues to deny any suicidal ideation.      The following portions of the patient's history were reviewed and   updated as appropriate: allergies, current medications, past family history, past medical history, past social history, past surgical history and problem list.    Compared to one year ago, the patient feels her physical   health is the same.    Compared to one year ago, the patient feels her mental   health is the same.    Recent Hospitalizations:  She was not admitted to the hospital during the last year.     Current Medical Providers:  Patient Care Team:  Jorge Boss MD as PCP - General (Family Medicine)    Outpatient Medications Prior to Visit   Medication Sig Dispense Refill   • cetirizine (zyrTEC) 10 MG tablet Take 1 tablet by mouth Daily. 90 tablet 3   • colchicine 0.6 MG tablet Take 1 tablet by mouth Daily. 2 tabs now then 1 tab in 2 hours 30 tablet 0   • fluticasone (FLONASE) 50 MCG/ACT nasal spray USE 2 SPRAYS IN EACH NOSTRIL ONCE DAILY 48 g 3   • Zoster Vac Recomb Adjuvanted (Shingrix) 50 MCG/0.5ML reconstituted suspension Inject 0.5 mL into the appropriate muscle as directed by prescriber See Admin Instructions. Repeat in 2-6 months 1 each 0   • bisoprolol (ZEBeta) 10 MG tablet TAKE ONE TABLET BY MOUTH EVERY NIGHT 60 tablet 3   • hydrALAZINE (APRESOLINE) 25 MG tablet TAKE 1 TABLET BY MOUTH 2 TIMES A DAY. 180 tablet 3   • losartan (COZAAR) 100 MG tablet TAKE ONE TABLET BY MOUTH EVERY MORNING 90 tablet 3   • montelukast (SINGULAIR) 10 MG tablet TAKE 1 TABLET BY MOUTH DAILY. 30 tablet 5   • Toviaz 8 MG tablet sustained-release 24 hour tablet TAKE 1 TABLET BY MOUTH DAILY. 90 tablet 5     No facility-administered medications prior to visit.     No opioid medication identified on active medication list. I have reviewed chart for other potential   high risk medication/s and harmful drug interactions in the elderly.          Aspirin is not on active medication list.  Aspirin use is not indicated based on review of current medical condition/s. Risk of harm outweighs potential benefits.  .    Patient Active Problem List   Diagnosis   • Osteopenia   • Mixed hyperlipidemia   • Essential hypertension   • Chronic allergic rhinitis   • Encounter for screening mammogram for breast cancer   • Healthcare maintenance   • Encounter for immunization   • Skin lesion   • History of acute gouty arthritis   • Urge urinary incontinence   • Reactive depression     Advance Care Planning  Advance Directive is on file.  ACP discussion was held with the patient during this visit. Patient has an advance directive in EMR which is still valid.     Review of Systems   Constitutional: Positive for fatigue. Negative for appetite change, chills, diaphoresis and fever.   HENT: Positive for rhinorrhea and sneezing. Negative for congestion, ear pain, postnasal drip, sinus pressure, sore throat and tinnitus.    Eyes: Negative for visual disturbance.   Respiratory: Negative for cough, shortness of breath and wheezing.    Cardiovascular: Negative for chest pain, palpitations and leg swelling.   Gastrointestinal: Negative for abdominal pain, blood in stool, constipation, diarrhea, nausea and vomiting.   Genitourinary: Positive for frequency and urgency. Negative for dysuria and hematuria.   Musculoskeletal: Negative for arthralgias, back pain, joint swelling and myalgias.   Skin: Negative for rash.   Neurological: Negative for weakness, numbness and confusion.   Psychiatric/Behavioral: Positive for dysphoric mood. Negative for decreased concentration, sleep disturbance and suicidal ideas. The patient is nervous/anxious.         Objective    Vitals:    08/26/22 1058   BP: 138/98   Pulse: 78   Resp: 14   Temp: 98.6 °F (37 °C)   TempSrc: Temporal   SpO2: 96%   Weight: 88.9 kg (196 lb)   Height:  "167.6 cm (65.98\")     Estimated body mass index is 31.65 kg/m² as calculated from the following:    Height as of this encounter: 167.6 cm (65.98\").    Weight as of this encounter: 88.9 kg (196 lb).    BMI is >= 30 and <35. (Class 1 Obesity). The following options were offered after discussion;: exercise counseling/recommendations and nutrition counseling/recommendations      Does the patient have evidence of cognitive impairment? No    Physical Exam  Constitutional:       General: She is not in acute distress.     Appearance: Normal appearance. She is well-developed. She is not diaphoretic.      Comments: Bright and in fair spirits. No apparent distress. No pallor, jaundice, diaphoresis, or cyanosis.   HENT:      Head: Atraumatic.      Right Ear: Tympanic membrane, ear canal and external ear normal.      Left Ear: Tympanic membrane, ear canal and external ear normal.   Eyes:      Conjunctiva/sclera: Conjunctivae normal.   Neck:      Thyroid: No thyroid mass or thyromegaly.      Vascular: No carotid bruit or JVD.      Trachea: Trachea normal. No tracheal deviation.   Cardiovascular:      Rate and Rhythm: Normal rate and regular rhythm.      Heart sounds: Normal heart sounds, S1 normal and S2 normal. No murmur heard.    No gallop.   Pulmonary:      Effort: Pulmonary effort is normal.      Breath sounds: Normal breath sounds.   Chest:   Breasts:      Right: No supraclavicular adenopathy.      Left: No supraclavicular adenopathy.       Abdominal:      General: Bowel sounds are normal. There is no distension or abdominal bruit.      Palpations: Abdomen is soft. There is no hepatomegaly, splenomegaly or mass.      Tenderness: There is no abdominal tenderness.      Hernia: No hernia is present.   Musculoskeletal:      Right lower leg: No edema.      Left lower leg: No edema.      Comments: No peripheral joint redness or warmth.   Lymphadenopathy:      Head:      Right side of head: No submental, submandibular, tonsillar, " preauricular, posterior auricular or occipital adenopathy.      Left side of head: No submental, submandibular, tonsillar, preauricular, posterior auricular or occipital adenopathy.      Cervical: No cervical adenopathy.      Upper Body:      Right upper body: No supraclavicular adenopathy.      Left upper body: No supraclavicular adenopathy.   Skin:     General: Skin is warm.      Coloration: Skin is not cyanotic, jaundiced or pale.      Findings: No rash.      Nails: There is no clubbing.   Neurological:      Mental Status: She is alert and oriented to person, place, and time.      Cranial Nerves: No cranial nerve deficit.      Motor: No tremor.      Coordination: Coordination normal.      Gait: Gait normal.   Psychiatric:         Attention and Perception: Attention normal.         Mood and Affect: Mood normal.         Speech: Speech normal.         Behavior: Behavior normal.         Thought Content: Thought content normal. Thought content does not include suicidal ideation.       HEALTH RISK ASSESSMENT    Smoking Status:  Social History     Tobacco Use   Smoking Status Never Smoker   Smokeless Tobacco Never Used     Alcohol Consumption:  Social History     Substance and Sexual Activity   Alcohol Use No     Fall Risk Screen:  STEADI Fall Risk Assessment was completed, and patient is at LOW risk for falls.Assessment completed on:8/26/2022    Depression Screening:  PHQ-2/PHQ-9 Depression Screening 8/26/2022   Retired PHQ-9 Total Score -   Retired Total Score -   Little Interest or Pleasure in Doing Things 0-->not at all   Feeling Down, Depressed or Hopeless 0-->not at all   PHQ-9: Brief Depression Severity Measure Score 0     Health Habits and Functional and Cognitive Screening:  Functional & Cognitive Status 8/26/2022   Do you have difficulty preparing food and eating? No   Do you have difficulty bathing yourself, getting dressed or grooming yourself? No   Do you have difficulty using the toilet? No   Do you have  difficulty moving around from place to place? No   Do you have trouble with steps or getting out of a bed or a chair? No   Current Diet Well Balanced Diet   Dental Exam Not up to date   Eye Exam Not up to date   Exercise (times per week) 7 times per week   Current Exercises Include Walking   Current Exercise Activities Include -   Do you need help using the phone?  No   Are you deaf or do you have serious difficulty hearing?  No   Do you need help with transportation? No   Do you need help shopping? No   Do you need help preparing meals?  No   Do you need help with housework?  No   Do you need help with laundry? No   Do you need help taking your medications? No   Do you need help managing money? No   Do you ever drive or ride in a car without wearing a seat belt? No   Have you felt unusual stress, anger or loneliness in the last month? No   Who do you live with? Other   If you need help, do you have trouble finding someone available to you? No   Have you been bothered in the last four weeks by sexual problems? No   Do you have difficulty concentrating, remembering or making decisions? No     Age-appropriate Screening Schedule:  Refer to the list below for future screening recommendations based on patient's age, sex and/or medical conditions. Orders for these recommended tests are listed in the plan section. The patient has been provided with a written plan.    Health Maintenance   Topic Date Due   • ZOSTER VACCINE (3 of 3) 02/04/2020   • INFLUENZA VACCINE  10/01/2022   • LIPID PANEL  11/05/2022   • PAP SMEAR  03/22/2023   • MAMMOGRAM  09/13/2023   • DXA SCAN  09/13/2023   • TDAP/TD VACCINES (2 - Td or Tdap) 03/25/2029        Assessment & Plan   CMS Preventative Services Quick Reference  Risk Factors Identified During Encounter  Dementia/Memory   Fall Risk-High or Moderate  Immunizations Discussed/Encouraged (specific Immunizations; Influenza and COVID19  Obesity/Overweight   The above risks/problems have been  discussed with the patient.  Follow up actions/plans if indicated are seen below in the Assessment/Plan Section.  Pertinent information has been shared with the patient in the After Visit Summary.    Diagnoses and all orders for this visit:    1. Chronic allergic rhinitis   Reminded regarding allergen avoidance.  Continue current medication  Encouraged to report if any worse or if any new symptoms or concerns.  -     montelukast (SINGULAIR) 10 MG tablet; Take 1 tablet by mouth Daily.  Dispense: 90 tablet; Refill: 3    2. Essential hypertension   Hypertension: elevated today. Evidence of target organ damage: none.  Encouraged to continue to work on diet and exercise plan.   Hydralazine will be titrated to 50 twice daily  We will continue remainder medication  -     hydrALAZINE (APRESOLINE) 50 MG tablet; Take 1 tablet by mouth 2 (Two) Times a Day.  Dispense: 180 tablet; Refill: 3  -     CBC & Differential; Future  -     Comprehensive Metabolic Panel; Future  -     TSH; Future  -     losartan (COZAAR) 100 MG tablet; Take 1 tablet by mouth Every Morning.  Dispense: 90 tablet; Refill: 3  -     bisoprolol (ZEBeta) 10 MG tablet; Take 1 tablet by mouth every night at bedtime.  Dispense: 90 tablet; Refill: 3    3. Mixed hyperlipidemia  As above.  Scheduled for updated labs later this year  -     Comprehensive Metabolic Panel; Future  -     Lipid Panel; Future  -     TSH; Future    4. Urge urinary incontinence  Continue current medication  Encouraged to report if any worse or if any new symptoms or concerns.  -     fesoterodine fumarate (Toviaz) 8 MG tablet sustained-release 24 hour tablet; Take 1 tablet by mouth Daily.  Dispense: 90 tablet; Refill: 5    5. Healthcare maintenance  Recommended a flu shot when available  Reminded that she is due for Shingrix #2  Reminded to follow-up with her scheduled mammogram    6. Reactive depression  Significant situational component.   Supportive therapy.   Encouraged to report if any  worse or if any new symptoms or concerns.  -     TSH; Future    7. History of acute gouty arthritis  Remains in remission  -     Uric Acid; Future  -     losartan (COZAAR) 100 MG tablet; Take 1 tablet by mouth Every Morning.  Dispense: 90 tablet; Refill: 3    8. Osteopenia, unspecified location  Encouraged to continue to pursue weight bearing activities while exercising joint protection.  We will consider an updated DEXA scan later next year  -     Vitamin D 25 Hydroxy; Future    Follow Up:   Return in about 5 months (around 1/30/2023).     An After Visit Summary and PPPS were made available to the patient.

## 2022-08-26 NOTE — PATIENT INSTRUCTIONS
Fall Prevention in the Home, Adult  Falls can cause injuries and can happen to people of all ages. There are many things you can do to make your home safe and to help prevent falls. Ask for help when making these changes.  What actions can I take to prevent falls?  General Instructions  Use good lighting in all rooms. Replace any light bulbs that burn out.  Turn on the lights in dark areas. Use night-lights.  Keep items that you use often in easy-to-reach places. Lower the shelves around your home if needed.  Set up your furniture so you have a clear path. Avoid moving your furniture around.  Do not have throw rugs or other things on the floor that can make you trip.  Avoid walking on wet floors.  If any of your floors are uneven, fix them.  Add color or contrast paint or tape to clearly jimmy and help you see:  Grab bars or handrails.  First and last steps of staircases.  Where the edge of each step is.  If you use a stepladder:  Make sure that it is fully opened. Do not climb a closed stepladder.  Make sure the sides of the stepladder are locked in place.  Ask someone to hold the stepladder while you use it.  Know where your pets are when moving through your home.  What can I do in the bathroom?         Keep the floor dry. Clean up any water on the floor right away.  Remove soap buildup in the tub or shower.  Use nonskid mats or decals on the floor of the tub or shower.  Attach bath mats securely with double-sided, nonslip rug tape.  If you need to sit down in the shower, use a plastic, nonslip stool.  Install grab bars by the toilet and in the tub and shower. Do not use towel bars as grab bars.  What can I do in the bedroom?  Make sure that you have a light by your bed that is easy to reach.  Do not use any sheets or blankets for your bed that hang to the floor.  Have a firm chair with side arms that you can use for support when you get dressed.  What can I do in the kitchen?  Clean up any spills right away.  If  you need to reach something above you, use a step stool with a grab bar.  Keep electrical cords out of the way.  Do not use floor polish or wax that makes floors slippery.  What can I do with my stairs?  Do not leave any items on the stairs.  Make sure that you have a light switch at the top and the bottom of the stairs.  Make sure that there are handrails on both sides of the stairs. Fix handrails that are broken or loose.  Install nonslip stair treads on all your stairs.  Avoid having throw rugs at the top or bottom of the stairs.  Choose a carpet that does not hide the edge of the steps on the stairs.  Check carpeting to make sure that it is firmly attached to the stairs. Fix carpet that is loose or worn.  What can I do on the outside of my home?  Use bright outdoor lighting.  Fix the edges of walkways and driveways and fix any cracks.  Remove anything that might make you trip as you walk through a door, such as a raised step or threshold.  Trim any bushes or trees on paths to your home.  Check to see if handrails are loose or broken and that both sides of all steps have handrails.  Install guardrails along the edges of any raised decks and porches.  Clear paths of anything that can make you trip, such as tools or rocks.  Have leaves, snow, or ice cleared regularly.  Use sand or salt on paths during winter.  Clean up any spills in your garage right away. This includes grease or oil spills.  What other actions can I take?  Wear shoes that:  Have a low heel. Do not wear high heels.  Have rubber bottoms.  Feel good on your feet and fit well.  Are closed at the toe. Do not wear open-toe sandals.  Use tools that help you move around if needed. These include:  Canes.  Walkers.  Scooters.  Crutches.  Review your medicines with your doctor. Some medicines can make you feel dizzy. This can increase your chance of falling.  Ask your doctor what else you can do to help prevent falls.  Where to find more information  Centers  for Disease Control and Prevention, [employer]: www.cdc.gov  [employer] [place] [employer] A[employer]: www.angi.nih.gov  Contact a doctor if:  You are afraid of falling at home.  You feel weak, drowsy, or dizzy at home.  You fall at home.  Summary  There are many simple things that you can do to make your home safe and to help prevent falls.  Ways to make your home safe include removing things that can make you trip and installing grab bars in the bathroom.  Ask for help when making these changes in your home.  This information is not intended to replace advice given to you by your health care provider. Make sure you discuss any questions you have with your health care provider.  Document Revised: 07/21/2021 Document Reviewed: 07/21/2021  [employer] Patient Education © 2021 [employer]  Mammogram  A mammogram is a low energy X-ray of the breasts that is done to check for abnormal changes. This procedure can screen for and detect any changes that may indicate breast cancer. Mammograms are regularly done on women. A man may have a mammogram if he has a lump or swelling in his breast. A mammogram can also identify other changes and variations in the breast, such as:  Inflammation of the breast tissue (mastitis).  An infected area that contains a collection of pus (abscess).  A fluid-filled sac (cyst).  Fibrocystic changes. This is when breast tissue becomes denser, which can make the tissue feel rope-like or uneven under the skin.  Tumors that are not cancerous (benign).  Tell a health care provider:  About any allergies you have.  If you have breast implants.  If you have had previous breast disease, biopsy, or surgery.  If you are breastfeeding.  If you are younger than age 25.  If you have a family history of breast cancer.  Whether you are pregnant or may be pregnant.  What are the risks?  Generally, this is a safe procedure. However, problems may occur, including:  Exposure to radiation. Radiation levels are very low with this  test.  The results being misinterpreted.  The need for further tests.  The inability of the mammogram to detect certain cancers.  What happens before the procedure?  Schedule your test about 1-2 weeks after your menstrual period if you are still menstruating. This is usually when your breasts are the least tender.  If you have had a mammogram done at a different facility in the past, get the mammogram X-rays or have them sent to your current exam facility. The new and old images will be compared.  Wash your breasts and underarms on the day of the test.  Do not wear deodorants, perfumes, lotions, or powders anywhere on your body on the day of the test.  Remove any jewelry from your neck.  Wear clothes that you can change into and out of easily.  What happens during the procedure?    You will undress from the waist up and put on a gown that opens in the front.  You will  front of the X-ray machine.  Each breast will be placed between two plastic or glass plates. The plates will compress your breast for a few seconds. Try to stay as relaxed as possible during the procedure. This does not cause any harm to your breasts and any discomfort you feel will be very brief.  X-rays will be taken from different angles of each breast.  The procedure may vary among health care providers and hospitals.  What happens after the procedure?  The mammogram will be examined by a specialist (radiologist).  You may need to repeat certain parts of the test, depending on the quality of the images. This is commonly done if the radiologist needs a better view of the breast tissue.  You may resume your normal activities.  It is up to you to get the results of your procedure. Ask your health care provider, or the department that is doing the procedure, when your results will be ready.  Summary  A mammogram is a low energy X-ray of the breasts that is done to check for abnormal changes. A man may have a mammogram if he has a lump or  "swelling in his breast.  If you have had a mammogram done at a different facility in the past, get the mammogram X-rays or have them sent to your current exam facility in order to compare them.  Schedule your test about 1-2 weeks after your menstrual period if you are still menstruating.  For this test, each breast will be placed between two plastic or glass plates. The plates will compress your breast for a few seconds.  Ask when your test results will be ready. Make sure you get your test results.  This information is not intended to replace advice given to you by your health care provider. Make sure you discuss any questions you have with your health care provider.  Document Revised: 08/08/2019 Document Reviewed: 08/08/2019  [employer] Patient Education © 2021 [employer]  https://www.cancer.org/cancer/colon-rectal-cancer/causes-risks-prevention/risk-factors.html\">   Colorectal Cancer Screening    Colorectal cancer screening is a group of tests that are used to check for colorectal cancer before symptoms develop. Colorectal refers to the colon and rectum. The colon and rectum are located at the end of the digestive tract and carry stool (feces) out of the body.  Who should have screening?  All adults who are 45-75 years old should have screening. Your health care provider may recommend screening before age 45. You will have tests every 1-10 years, depending on your results and the type of screening test. Screening recommendations for adults who are 76-85 years old vary depending on a person's health. People older than age 85 should no longer get colorectal cancer screening.  You may have screening tests starting before age 45, or more often than other people, if you have any of these risk factors:  A personal or family history of colorectal cancer or abnormal growths known as polyps in your colon.  Inflammatory bowel disease, such as ulcerative colitis or Crohn's disease.  A history of having radiation treatment " to the abdomen or the area between the hip bones (pelvic area) for cancer.  A type of genetic syndrome that is passed from parent to child (hereditary), such as:  Posey syndrome.  Familial adenomatous polyposis.  Turcot syndrome.  Peutz-Jeghers syndrome.  MUTYH-associated polyposis (MAP).  A personal history of diabetes.  Types of tests  There are several types of colorectal screening tests. You may have one or more of the following:  Guaiac-based fecal occult blood testing. For this test, a stool sample is checked for hidden (occult) blood, which could be a sign of colorectal cancer.  Fecal immunochemical test (FIT). For this test, a stool sample is checked for blood, which could be a sign of colorectal cancer.  Stool DNA test. For this test, a stool sample is checked for blood and changes in DNA that could lead to colorectal cancer.  Sigmoidoscopy. During this test, a thin, flexible tube with a camera on the end, called a sigmoidoscope, is used to examine the rectum and the lower colon.  Colonoscopy. During this test, a long, flexible tube with a camera on the end, called a colonoscope, is used to examine the entire colon and rectum. Also, sometimes a tissue sample is taken to be looked at under a microscope (biopsy) or small polyps are removed during this test.  Virtual colonoscopy. Instead of a colonoscope, this type of colonoscopy uses a CT scan to take pictures of the colon and rectum. A CT scan is a type of X-ray that is made using computers.  What are the benefits of screening?  Screening reduces your risk for colorectal cancer and can help identify cancer at an early stage, when the cancer can be removed or treated more easily. It is common for polyps to form in the lining of the colon, especially as you age. These polyps may be cancerous or become cancerous over time. Screening can identify these polyps.  What are the risks of screening?  Generally, these are safe tests. However, problems may occur,  including:  The need for more tests to confirm results from a stool sample test. Stool sample tests have fewer risks than other types of screening tests.  Being exposed to low levels of radiation, if you had a test involving X-rays. This may slightly increase your cancer risk. The benefit of detecting cancer outweighs the slight increase in risk.  Bleeding, damage to the intestine, or infection caused by a sigmoidoscopy or colonoscopy.  A reaction to medicines given during a sigmoidoscopy or colonoscopy.  Talk with your health care provider to understand your risk for colorectal cancer and to make a screening plan that is right for you.  Questions to ask your health care provider  When should I start colorectal cancer screening?  What is my risk for colorectal cancer?  How often do I need screening?  Which screening tests do I need?  How do I get my test results?  What do my results mean?  Where to find more information  Learn more about colorectal cancer screening from:  The American Cancer Society: cancer.org  National Cancer [place]: cancer.gov  Summary  Colorectal cancer screening is a group of tests used to check for colorectal cancer before symptoms develop.  All adults who are 45-75 years old should have screening. Your health care provider may recommend screening before age 45.  You may have screening tests starting before age 45, or more often than other people, if you have certain risk factors.  Screening reduces your risk for colorectal cancer and can help identify cancer at an early stage, when the cancer can be removed or treated more easily.  Talk with your health care provider to understand your risk for colorectal cancer and to make a screening plan that is right for you.  This information is not intended to replace advice given to you by your health care provider. Make sure you discuss any questions you have with your health care provider.  Document Revised: 04/07/2021 Document Reviewed:  04/07/2021  Elsevier Patient Education © 2021 [employer].

## 2022-08-27 VITALS
OXYGEN SATURATION: 96 % | BODY MASS INDEX: 31.5 KG/M2 | WEIGHT: 196 LBS | HEART RATE: 78 BPM | TEMPERATURE: 98.6 F | DIASTOLIC BLOOD PRESSURE: 98 MMHG | SYSTOLIC BLOOD PRESSURE: 138 MMHG | HEIGHT: 66 IN | RESPIRATION RATE: 14 BRPM

## 2022-08-27 RX ORDER — BISOPROLOL FUMARATE 10 MG/1
10 TABLET, FILM COATED ORAL
Qty: 90 TABLET | Refills: 3 | Status: SHIPPED | OUTPATIENT
Start: 2022-08-27

## 2022-08-27 RX ORDER — FESOTERODINE FUMARATE 8 MG/1
8 TABLET, EXTENDED RELEASE ORAL DAILY
Qty: 90 TABLET | Refills: 5 | Status: SHIPPED | OUTPATIENT
Start: 2022-08-27

## 2022-08-27 RX ORDER — MONTELUKAST SODIUM 10 MG/1
10 TABLET ORAL DAILY
Qty: 90 TABLET | Refills: 3 | Status: SHIPPED | OUTPATIENT
Start: 2022-08-27 | End: 2022-12-05

## 2022-08-27 RX ORDER — LOSARTAN POTASSIUM 100 MG/1
100 TABLET ORAL EVERY MORNING
Qty: 90 TABLET | Refills: 3 | Status: SHIPPED | OUTPATIENT
Start: 2022-08-27

## 2022-09-15 DIAGNOSIS — I10 ESSENTIAL HYPERTENSION: ICD-10-CM

## 2022-09-15 RX ORDER — HYDRALAZINE HYDROCHLORIDE 25 MG/1
TABLET, FILM COATED ORAL
Qty: 180 TABLET | Refills: 3 | OUTPATIENT
Start: 2022-09-15

## 2022-09-15 RX ORDER — HYDRALAZINE HYDROCHLORIDE 50 MG/1
50 TABLET, FILM COATED ORAL 2 TIMES DAILY
Qty: 180 TABLET | Refills: 3 | Status: SHIPPED | OUTPATIENT
Start: 2022-09-15

## 2022-09-28 ENCOUNTER — HOSPITAL ENCOUNTER (OUTPATIENT)
Dept: MAMMOGRAPHY | Facility: HOSPITAL | Age: 82
Discharge: HOME | End: 2022-09-28
Admitting: GENERAL PRACTICE

## 2022-09-28 DIAGNOSIS — Z12.31 VISIT FOR SCREENING MAMMOGRAM: ICD-10-CM

## 2022-09-28 PROCEDURE — 77063 BREAST TOMOSYNTHESIS BI: CPT | Performed by: RADIOLOGY

## 2022-09-28 PROCEDURE — 77063 BREAST TOMOSYNTHESIS BI: CPT

## 2022-09-28 PROCEDURE — 77067 SCR MAMMO BI INCL CAD: CPT | Performed by: RADIOLOGY

## 2022-09-28 PROCEDURE — 77067 SCR MAMMO BI INCL CAD: CPT

## 2022-11-10 ENCOUNTER — TELEPHONE (OUTPATIENT)
Dept: FAMILY MEDICINE CLINIC | Facility: CLINIC | Age: 82
End: 2022-11-10

## 2022-11-10 NOTE — TELEPHONE ENCOUNTER
Spoke to the patient and she stated that she had missed a call from the office. I didn't see any messages in her chart but saw she had an apt on the 17th. I told her it was probably the automatic system calling her to confirm her apt. She stated that she would still be here for this apt.

## 2022-11-10 NOTE — TELEPHONE ENCOUNTER
Caller: Rylee Hull    Relationship: Self    Best call back number: [phone]    What is the best time to reach you: ANY    Who are you requesting to speak with (clinical staff, provider,  specific staff member): ANY    Do you know the name of the person who called: NO    What was the call regarding: PATIENT PICKED UP A CALL FORM THE OFFICE AND NOBODY WAS ON THE OTHER SIDE OF IT.     Do you require a callback: YES.

## 2022-11-17 ENCOUNTER — LAB (OUTPATIENT)
Dept: FAMILY MEDICINE CLINIC | Facility: CLINIC | Age: 82
End: 2022-11-17

## 2022-11-17 DIAGNOSIS — Z87.39 HISTORY OF ACUTE GOUTY ARTHRITIS: ICD-10-CM

## 2022-11-17 DIAGNOSIS — F32.9 REACTIVE DEPRESSION: ICD-10-CM

## 2022-11-17 DIAGNOSIS — I10 ESSENTIAL HYPERTENSION: ICD-10-CM

## 2022-11-17 DIAGNOSIS — E78.2 MIXED HYPERLIPIDEMIA: ICD-10-CM

## 2022-11-17 DIAGNOSIS — M85.80 OSTEOPENIA, UNSPECIFIED LOCATION: ICD-10-CM

## 2022-11-17 LAB
25(OH)D3 SERPL-MCNC: 55.9 NG/ML (ref 30–100)
ALBUMIN SERPL-MCNC: 4.3 G/DL (ref 3.5–5.2)
ALBUMIN/GLOB SERPL: 1.8 G/DL
ALP SERPL-CCNC: 82 U/L (ref 39–117)
ALT SERPL W P-5'-P-CCNC: 20 U/L (ref 1–33)
ANION GAP SERPL CALCULATED.3IONS-SCNC: 9 MMOL/L (ref 5–15)
AST SERPL-CCNC: 23 U/L (ref 1–32)
BASOPHILS # BLD AUTO: 0.05 10*3/MM3 (ref 0–0.2)
BASOPHILS NFR BLD AUTO: 0.9 % (ref 0–1.5)
BILIRUB SERPL-MCNC: 0.4 MG/DL (ref 0–1.2)
BUN SERPL-MCNC: 15 MG/DL (ref 8–23)
BUN/CREAT SERPL: 21.7 (ref 7–25)
CALCIUM SPEC-SCNC: 9.2 MG/DL (ref 8.6–10.5)
CHLORIDE SERPL-SCNC: 106 MMOL/L (ref 98–107)
CHOLEST SERPL-MCNC: 181 MG/DL (ref 0–200)
CO2 SERPL-SCNC: 26 MMOL/L (ref 22–29)
CREAT SERPL-MCNC: 0.69 MG/DL (ref 0.57–1)
DEPRECATED RDW RBC AUTO: 42.3 FL (ref 37–54)
EGFRCR SERPLBLD CKD-EPI 2021: 87.3 ML/MIN/1.73
EOSINOPHIL # BLD AUTO: 0.19 10*3/MM3 (ref 0–0.4)
EOSINOPHIL NFR BLD AUTO: 3.3 % (ref 0.3–6.2)
ERYTHROCYTE [DISTWIDTH] IN BLOOD BY AUTOMATED COUNT: 12.9 % (ref 12.3–15.4)
GLOBULIN UR ELPH-MCNC: 2.4 GM/DL
GLUCOSE SERPL-MCNC: 99 MG/DL (ref 65–99)
HCT VFR BLD AUTO: 39 % (ref 34–46.6)
HDLC SERPL-MCNC: 64 MG/DL (ref 40–60)
HGB BLD-MCNC: 13 G/DL (ref 12–15.9)
IMM GRANULOCYTES # BLD AUTO: 0.01 10*3/MM3 (ref 0–0.05)
IMM GRANULOCYTES NFR BLD AUTO: 0.2 % (ref 0–0.5)
LDLC SERPL CALC-MCNC: 100 MG/DL (ref 0–100)
LDLC/HDLC SERPL: 1.53 {RATIO}
LYMPHOCYTES # BLD AUTO: 1.91 10*3/MM3 (ref 0.7–3.1)
LYMPHOCYTES NFR BLD AUTO: 33 % (ref 19.6–45.3)
MCH RBC QN AUTO: 29.7 PG (ref 26.6–33)
MCHC RBC AUTO-ENTMCNC: 33.3 G/DL (ref 31.5–35.7)
MCV RBC AUTO: 89.2 FL (ref 79–97)
MONOCYTES # BLD AUTO: 0.41 10*3/MM3 (ref 0.1–0.9)
MONOCYTES NFR BLD AUTO: 7.1 % (ref 5–12)
NEUTROPHILS NFR BLD AUTO: 3.21 10*3/MM3 (ref 1.7–7)
NEUTROPHILS NFR BLD AUTO: 55.5 % (ref 42.7–76)
NRBC BLD AUTO-RTO: 0 /100 WBC (ref 0–0.2)
PLATELET # BLD AUTO: 292 10*3/MM3 (ref 140–450)
PMV BLD AUTO: 9.1 FL (ref 6–12)
POTASSIUM SERPL-SCNC: 4.3 MMOL/L (ref 3.5–5.2)
PROT SERPL-MCNC: 6.7 G/DL (ref 6–8.5)
RBC # BLD AUTO: 4.37 10*6/MM3 (ref 3.77–5.28)
SODIUM SERPL-SCNC: 141 MMOL/L (ref 136–145)
TRIGL SERPL-MCNC: 95 MG/DL (ref 0–150)
TSH SERPL DL<=0.05 MIU/L-ACNC: 1.28 UIU/ML (ref 0.27–4.2)
URATE SERPL-MCNC: 6.3 MG/DL (ref 2.4–5.7)
VLDLC SERPL-MCNC: 17 MG/DL (ref 5–40)
WBC NRBC COR # BLD: 5.78 10*3/MM3 (ref 3.4–10.8)

## 2022-11-17 PROCEDURE — 85025 COMPLETE CBC W/AUTO DIFF WBC: CPT | Performed by: GENERAL PRACTICE

## 2022-11-17 PROCEDURE — 80061 LIPID PANEL: CPT | Performed by: GENERAL PRACTICE

## 2022-11-17 PROCEDURE — 82306 VITAMIN D 25 HYDROXY: CPT | Performed by: GENERAL PRACTICE

## 2022-11-17 PROCEDURE — 84443 ASSAY THYROID STIM HORMONE: CPT | Performed by: GENERAL PRACTICE

## 2022-11-17 PROCEDURE — 84550 ASSAY OF BLOOD/URIC ACID: CPT | Performed by: GENERAL PRACTICE

## 2022-11-17 PROCEDURE — 80053 COMPREHEN METABOLIC PANEL: CPT | Performed by: GENERAL PRACTICE

## 2022-12-04 DIAGNOSIS — J30.9 CHRONIC ALLERGIC RHINITIS: ICD-10-CM

## 2022-12-05 RX ORDER — MONTELUKAST SODIUM 10 MG/1
TABLET ORAL
Qty: 90 TABLET | Refills: 3 | Status: SHIPPED | OUTPATIENT
Start: 2022-12-05

## 2023-01-26 ENCOUNTER — OFFICE VISIT (OUTPATIENT)
Dept: FAMILY MEDICINE CLINIC | Facility: CLINIC | Age: 83
End: 2023-01-26
Payer: MEDICARE

## 2023-01-26 VITALS
HEART RATE: 80 BPM | RESPIRATION RATE: 14 BRPM | SYSTOLIC BLOOD PRESSURE: 131 MMHG | HEIGHT: 66 IN | BODY MASS INDEX: 31.66 KG/M2 | WEIGHT: 197 LBS | OXYGEN SATURATION: 94 % | DIASTOLIC BLOOD PRESSURE: 70 MMHG | TEMPERATURE: 98.6 F

## 2023-01-26 DIAGNOSIS — Z87.39 HISTORY OF ACUTE GOUTY ARTHRITIS: ICD-10-CM

## 2023-01-26 DIAGNOSIS — F32.9 REACTIVE DEPRESSION: ICD-10-CM

## 2023-01-26 DIAGNOSIS — Z00.00 HEALTHCARE MAINTENANCE: ICD-10-CM

## 2023-01-26 DIAGNOSIS — E78.2 MIXED HYPERLIPIDEMIA: ICD-10-CM

## 2023-01-26 DIAGNOSIS — N39.41 URGE URINARY INCONTINENCE: ICD-10-CM

## 2023-01-26 DIAGNOSIS — I10 ESSENTIAL HYPERTENSION: ICD-10-CM

## 2023-01-26 DIAGNOSIS — M85.80 OSTEOPENIA, UNSPECIFIED LOCATION: ICD-10-CM

## 2023-01-26 DIAGNOSIS — J30.9 CHRONIC ALLERGIC RHINITIS: Primary | ICD-10-CM

## 2023-01-26 PROCEDURE — 99214 OFFICE O/P EST MOD 30 MIN: CPT | Performed by: GENERAL PRACTICE

## 2023-01-26 NOTE — PROGRESS NOTES
Subjective   Rylee Hull is a 82 y.o. female.     Chief Complaint  She returns for a scheduled reassessment of multiple medical problems including essential hypertension, hyperlipidemia, previous gouty arthritis, urge urinary continence, chronic allergic rhinitis, and depression    History of Present Illness     Essential hypertension  Home blood pressure readings: have generally been at goal. She continues to deny any chest pain, palpitations, dyspnea, orthopnea, paroxysmal nocturnal dyspnea or peripheral edema. Current antihypertensive medications includes bisoprolol, losartan, and hydralazine.   Lab Results   Component Value Date    GLUCOSE 99 11/17/2022    BUN 15 11/17/2022    CREATININE 0.69 11/17/2022    EGFRIFNONA 79 11/05/2021    EGFRIFAFRI  09/07/2016      Comment:      <15 Indicative of kidney failure.    BCR 21.7 11/17/2022    K 4.3 11/17/2022    CO2 26.0 11/17/2022    CALCIUM 9.2 11/17/2022    ALBUMIN 4.30 11/17/2022    LABIL2 1.6 09/01/2015    AST 23 11/17/2022    ALT 20 11/17/2022     Lab Results   Component Value Date    ALKPHOS 82 11/17/2022     Hyperlipidemia  Her compliance with treatment remains good.  She exercises some daily. She is currently being prescribed the following medication for her dyslipidemia - lifestyle modifcation.  Lab Results   Component Value Date    CHOL 181 11/17/2022    CHLPL 204 (H) 09/01/2015    TRIG 95 11/17/2022    HDL 64 (H) 11/17/2022     11/17/2022     Gouty Arthritis  She denies any attacks since last here and has not required any colchicine.  She has a family history of gout.    Lab Results   Component Value Date    URICACID 6.3 (H) 11/17/2022     Urinary Incontinence  She has a long history of urinary frequency, nocturia x 1-2, urgency and occasional incontinence if she does not get to the bathroom quick enough. She continues to deny any dysuria, hematuria, or incontinence with coughing, sneezing, lifting, pushing or pulling.  There is no history of  any fecal incontinence.  She continues to feel that fesoterodine has helped and has not experienced any apparent side effects     Chronic Allergic Rhinitis   She has a long history of intermittent rhinorrhea, nasal congestion, and postnasal drip. When severe these symptoms are associated with a dry cough. There is no history of any other upper respiratory tract symptoms and she continues to deny any chest pain, shortness of breath, hemoptysis, fever, or chills. She remains on nasal fluticasone, cetirizine, and montelukast and feels all three help some     Depression  She continues to experience intermittent depression, nervousness, difficulty sleeping, and fatigue. There has been no change in her appetite and she continues to deny any suicidal ideation.  She is on no medication for this  Lab Results   Component Value Date    TSH 1.280 11/17/2022     Labs  Most recent vitamin D 55.9    The following portions of the patient's history were reviewed and updated as appropriate: allergies, current medications, past medical history, past social history and problem list.    Review of Systems   Constitutional: Positive for fatigue. Negative for appetite change, chills, fever and unexpected weight change.   HENT: Positive for rhinorrhea and sneezing. Negative for congestion, ear pain, postnasal drip, sinus pain and sore throat.    Eyes: Negative for visual disturbance.   Respiratory: Negative for cough, shortness of breath and wheezing.    Cardiovascular: Negative for chest pain, palpitations and leg swelling.   Gastrointestinal: Negative for abdominal pain, blood in stool, constipation, diarrhea, nausea and vomiting.   Genitourinary: Positive for frequency and urgency. Negative for dysuria and hematuria.   Musculoskeletal: Negative for arthralgias and back pain.   Skin: Negative for rash.   Neurological: Negative for weakness, numbness and headaches.   Psychiatric/Behavioral: Negative for dysphoric mood and sleep  disturbance. The patient is nervous/anxious (intermittent).      Objective   Physical Exam  Constitutional:       General: She is not in acute distress.     Appearance: Normal appearance. She is well-developed. She is not diaphoretic.      Comments: Bright and in fair spirits. No apparent distress. No pallor, jaundice, diaphoresis, or cyanosis.   HENT:      Head: Atraumatic.      Right Ear: Tympanic membrane, ear canal and external ear normal.      Left Ear: Tympanic membrane, ear canal and external ear normal.      Mouth/Throat:      Lips: No lesions.      Mouth: Mucous membranes are moist. No oral lesions.      Pharynx: No oropharyngeal exudate or posterior oropharyngeal erythema.   Eyes:      General: Lids are normal.      Extraocular Movements: Extraocular movements intact.      Conjunctiva/sclera: Conjunctivae normal.      Pupils: Pupils are equal.   Neck:      Thyroid: No thyroid mass or thyromegaly.      Vascular: No carotid bruit or JVD.      Trachea: Trachea normal. No tracheal deviation.   Cardiovascular:      Rate and Rhythm: Normal rate and regular rhythm.      Heart sounds: Normal heart sounds, S1 normal and S2 normal. No murmur heard.    No gallop.   Pulmonary:      Effort: Pulmonary effort is normal.      Breath sounds: Normal breath sounds.   Abdominal:      General: Bowel sounds are normal. There is no distension.   Musculoskeletal:      Right lower leg: No edema.      Left lower leg: No edema.      Comments: No peripheral joint redness or warmth.   Lymphadenopathy:      Head:      Right side of head: No submental, submandibular, tonsillar, preauricular, posterior auricular or occipital adenopathy.      Left side of head: No submental, submandibular, tonsillar, preauricular, posterior auricular or occipital adenopathy.      Cervical: No cervical adenopathy.      Upper Body:      Right upper body: No supraclavicular adenopathy.      Left upper body: No supraclavicular adenopathy.   Skin:     General:  Skin is warm.      Coloration: Skin is not cyanotic, jaundiced or pale.      Findings: No rash.      Nails: There is no clubbing.   Neurological:      Mental Status: She is alert and oriented to person, place, and time.      Cranial Nerves: No cranial nerve deficit, dysarthria or facial asymmetry.      Sensory: No sensory deficit.      Motor: No tremor.      Coordination: Coordination normal.      Gait: Gait normal.   Psychiatric:         Attention and Perception: Attention normal.         Mood and Affect: Mood normal.         Speech: Speech normal.         Behavior: Behavior normal.         Thought Content: Thought content normal. Thought content does not include suicidal ideation.       Assessment & Plan   Problems Addressed this Visit        Allergies and Adverse Reactions    Chronic allergic rhinitis   Continue current medication  Encouraged to report if any worse or if any new symptoms or concerns.       Cardiac and Vasculature    Essential hypertension   Hypertension: at goal. Evidence of target organ damage: none.  Encouraged to continue to work on diet and exercise plan.   Continue current medication    Mixed hyperlipidemia  As above.   Continue current medication.       Genitourinary and Reproductive     Urge urinary incontinence  Continue current medication  Encouraged to report if any worse or if any new symptoms or concerns.       Health Encounters    Healthcare maintenance  Patient has received a flu shot along with an updated bivalent COVID-19 shot.       Mental Health    Reactive depression  Significant situational component.   Supportive therapy.   Encouraged to report if any worse or if any new symptoms or concerns.       Musculoskeletal and Injuries    History of acute gouty arthritis  Remains in remission  Encouraged to report if this should change.    Osteopenia  Encouraged to continue to pursue weight bearing activities while exercising joint protection.  We will plan on updating a DEXA scan in  2024   Diagnoses       Codes Comments    Chronic allergic rhinitis    -  Primary ICD-10-CM: J30.9  ICD-9-CM: 477.9     Essential hypertension     ICD-10-CM: I10  ICD-9-CM: 401.9     Mixed hyperlipidemia     ICD-10-CM: E78.2  ICD-9-CM: 272.2     Urge urinary incontinence     ICD-10-CM: N39.41  ICD-9-CM: 788.31     Healthcare maintenance     ICD-10-CM: Z00.00  ICD-9-CM: V70.0     Reactive depression     ICD-10-CM: F32.9  ICD-9-CM: 300.4     History of acute gouty arthritis     ICD-10-CM: Z87.39  ICD-9-CM: V13.4     Osteopenia, unspecified location     ICD-10-CM: M85.80  ICD-9-CM: 733.90

## 2023-06-02 ENCOUNTER — OFFICE VISIT (OUTPATIENT)
Dept: FAMILY MEDICINE CLINIC | Facility: CLINIC | Age: 83
End: 2023-06-02

## 2023-06-02 VITALS
TEMPERATURE: 98.6 F | OXYGEN SATURATION: 96 % | RESPIRATION RATE: 14 BRPM | DIASTOLIC BLOOD PRESSURE: 66 MMHG | HEIGHT: 66 IN | HEART RATE: 82 BPM | WEIGHT: 196 LBS | BODY MASS INDEX: 31.5 KG/M2 | SYSTOLIC BLOOD PRESSURE: 140 MMHG

## 2023-06-02 DIAGNOSIS — N39.41 URGE URINARY INCONTINENCE: ICD-10-CM

## 2023-06-02 DIAGNOSIS — Z87.39 HISTORY OF ACUTE GOUTY ARTHRITIS: ICD-10-CM

## 2023-06-02 DIAGNOSIS — F32.9 REACTIVE DEPRESSION: ICD-10-CM

## 2023-06-02 DIAGNOSIS — L98.9 SKIN LESION: ICD-10-CM

## 2023-06-02 DIAGNOSIS — I10 ESSENTIAL HYPERTENSION: ICD-10-CM

## 2023-06-02 DIAGNOSIS — J30.9 CHRONIC ALLERGIC RHINITIS: Primary | ICD-10-CM

## 2023-06-02 DIAGNOSIS — M85.80 OSTEOPENIA, UNSPECIFIED LOCATION: ICD-10-CM

## 2023-06-02 DIAGNOSIS — E78.2 MIXED HYPERLIPIDEMIA: ICD-10-CM

## 2023-06-02 DIAGNOSIS — Z00.00 HEALTHCARE MAINTENANCE: ICD-10-CM

## 2023-06-02 RX ORDER — LOSARTAN POTASSIUM 100 MG/1
100 TABLET ORAL EVERY MORNING
Qty: 90 TABLET | Refills: 3 | Status: SHIPPED | OUTPATIENT
Start: 2023-06-02

## 2023-06-02 RX ORDER — FESOTERODINE FUMARATE 8 MG/1
8 TABLET, EXTENDED RELEASE ORAL DAILY
Qty: 90 TABLET | Refills: 5 | Status: SHIPPED | OUTPATIENT
Start: 2023-06-02

## 2023-06-02 RX ORDER — MONTELUKAST SODIUM 10 MG/1
10 TABLET ORAL DAILY
Qty: 90 TABLET | Refills: 3 | Status: SHIPPED | OUTPATIENT
Start: 2023-06-02

## 2023-06-02 RX ORDER — FLUTICASONE PROPIONATE 50 MCG
2 SPRAY, SUSPENSION (ML) NASAL 2 TIMES DAILY
Qty: 48 G | Refills: 3 | Status: SHIPPED | OUTPATIENT
Start: 2023-06-02

## 2023-06-02 RX ORDER — BISOPROLOL FUMARATE 10 MG/1
10 TABLET, FILM COATED ORAL
Qty: 90 TABLET | Refills: 3 | Status: SHIPPED | OUTPATIENT
Start: 2023-06-02

## 2023-06-02 RX ORDER — HYDRALAZINE HYDROCHLORIDE 50 MG/1
50 TABLET, FILM COATED ORAL 2 TIMES DAILY
Qty: 180 TABLET | Refills: 3 | Status: SHIPPED | OUTPATIENT
Start: 2023-06-02

## 2023-06-02 NOTE — PROGRESS NOTES
Subjective   Rylee Hull is a 83 y.o. female.     Chief Complaint  Skin lesion    History of Present Illness     Skin Lesion  She gives an approximate 9 month history of a lesion over her left cheek.  This appeared suddenly, did not seem to change much, but then suddenly doubled in size over the last month.  To date, there have been no associated symptoms, and she specifically denies any pain, pruritus, or bleeding.  She does not feel there has been any change in the lesion over her right mid jawline.    Essential hypertension  Home blood pressure readings: have been at goal. She continues to deny any chest pain, palpitations, dyspnea, orthopnea, paroxysmal nocturnal dyspnea or peripheral edema.  She remains on bisoprolol, losartan, and hydralazine.     Hyperlipidemia  Her compliance with treatment remains good.  She is following a reasonable diet and exercises some daily. She is currently being prescribed the following medication for her dyslipidemia - lifestyle modifcation.    Gouty Arthritis  She denies any attacks since last here and has not required any colchicine.  She has a family history of gout.      Urinary Incontinence  She has a long history of urinary frequency, nocturia x 1-2, urgency and occasional incontinence if she does not get to the bathroom quick enough. She continues to deny any dysuria, hematuria, or incontinence with coughing, sneezing, lifting, pushing or pulling.  There is no history of any fecal incontinence.  She continues to feel that fesoterodine has helped with no apparent side effects     Chronic Allergic Rhinitis   She has a long history of intermittent rhinorrhea, nasal congestion, and postnasal drip. When severe these symptoms are associated with a dry cough. There is no history of any other upper respiratory tract symptoms and she continues to deny any chest pain, shortness of breath, hemoptysis, fever, or chills. She remains on nasal fluticasone, cetirizine, and  montelukast and feels all three help some     Depression  She continues to experience intermittent depression, nervousness, difficulty sleeping, and fatigue. There has been no change in her appetite and she continues to deny any suicidal ideation.  She is on no medication for this    The following portions of the patient's history were reviewed and updated as appropriate: allergies, current medications, past medical history, past social history and problem list.    Review of Systems   Constitutional: Positive for fatigue. Negative for appetite change, chills, fever and unexpected weight change.   HENT: Positive for congestion, postnasal drip, rhinorrhea and sneezing. Negative for ear pain, sinus pain and sore throat.    Eyes: Negative for visual disturbance.   Respiratory: Negative for cough, shortness of breath and wheezing.    Cardiovascular: Negative for chest pain, palpitations and leg swelling.   Gastrointestinal: Negative for abdominal pain, blood in stool, constipation, diarrhea, nausea and vomiting.   Genitourinary: Positive for frequency and urgency. Negative for dysuria and hematuria.   Musculoskeletal: Negative for arthralgias and back pain.   Skin: Negative for rash.        lesions   Neurological: Negative for weakness, numbness and headaches.   Psychiatric/Behavioral: Negative for dysphoric mood and sleep disturbance. The patient is nervous/anxious (intermittent).        Objective   Physical Exam  Constitutional:       General: She is not in acute distress.     Appearance: Normal appearance. She is well-developed. She is not diaphoretic.      Comments: Bright and in good spirits. No apparent distress. No pallor, jaundice, diaphoresis, or cyanosis.   HENT:      Head: Atraumatic.      Right Ear: Tympanic membrane, ear canal and external ear normal.      Left Ear: Tympanic membrane, ear canal and external ear normal.      Mouth/Throat:      Lips: No lesions.      Mouth: Mucous membranes are moist. No oral  lesions.      Pharynx: No oropharyngeal exudate or posterior oropharyngeal erythema.   Eyes:      General: Lids are normal.      Extraocular Movements: Extraocular movements intact.      Conjunctiva/sclera: Conjunctivae normal.      Pupils: Pupils are equal.   Neck:      Thyroid: No thyroid mass or thyromegaly.      Vascular: No carotid bruit or JVD.      Trachea: Trachea normal. No tracheal deviation.   Cardiovascular:      Rate and Rhythm: Normal rate and regular rhythm.      Heart sounds: Normal heart sounds, S1 normal and S2 normal. No murmur heard.    No gallop.   Pulmonary:      Effort: Pulmonary effort is normal.      Breath sounds: Normal breath sounds.   Abdominal:      General: Bowel sounds are normal. There is no distension.      Palpations: There is no mass.   Musculoskeletal:      Right lower leg: No edema.      Left lower leg: No edema.      Comments: No peripheral joint redness or warmth.   Lymphadenopathy:      Head:      Right side of head: No submental, submandibular, tonsillar, preauricular, posterior auricular or occipital adenopathy.      Left side of head: No submental, submandibular, tonsillar, preauricular, posterior auricular or occipital adenopathy.      Cervical: No cervical adenopathy.      Upper Body:      Right upper body: No supraclavicular adenopathy.      Left upper body: No supraclavicular adenopathy.   Skin:     General: Skin is warm.      Coloration: Skin is not cyanotic, jaundiced or pale.      Findings: Lesion (10 mm raised erythematous papule with telangiectasias and early central ulceration.  Firm.  Nontender.  12 mm slightly irregular well demarcated erythematous patch right medial jawline) present. No rash.      Nails: There is no clubbing.   Neurological:      Mental Status: She is alert and oriented to person, place, and time.      Cranial Nerves: No cranial nerve deficit, dysarthria or facial asymmetry.      Sensory: No sensory deficit.      Motor: No tremor.       Coordination: Coordination normal.      Gait: Gait normal.   Psychiatric:         Attention and Perception: Attention normal.         Mood and Affect: Mood normal.         Speech: Speech normal.         Behavior: Behavior normal.         Thought Content: Thought content normal. Thought content does not include suicidal ideation.       Assessment & Plan   Problems Addressed this Visit        Allergies and Adverse Reactions    Chronic allergic rhinitis   Reminded regarding allergen avoidance.  Continue current medication  Encouraged to report if any worse or if any new symptoms or concerns.    Relevant Medications    montelukast (SINGULAIR) 10 MG tablet    fluticasone (FLONASE) 50 MCG/ACT nasal spray       Cardiac and Vasculature    Essential hypertension   Hypertension: at goal. Evidence of target organ damage: none.  Encouraged to continue to work on diet and exercise plan.   Continue current medication    Relevant Medications    losartan (COZAAR) 100 MG tablet    hydrALAZINE (APRESOLINE) 50 MG tablet    bisoprolol (ZEBeta) 10 MG tablet    Mixed hyperlipidemia  As above.  Updated labs will be drawn at her return.       Genitourinary and Reproductive     Urge urinary incontinence  Continue current medication  Encouraged to report if any worse or if any new symptoms or concerns.    Relevant Medications    fesoterodine fumarate (Toviaz) 8 MG tablet sustained-release 24 hour tablet       Health Encounters    Healthcare maintenance  Recommended an updated bivalent COVID-19 vaccination.  Reminded to get a flu shot when available       Mental Health    Reactive depression  Significant situational component.   Supportive therapy.   Encouraged to report if any worse or if any new symptoms or concerns.       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  Stable erythematous patch right mid jawline  Reviewed options and agreed on a referral to surgery for excisional biopsy of the former  We will discuss a diagnostic punch biopsy of the latter at her return  Encouraged report if any questions or concerns in the meantime    Relevant Orders    Ambulatory Referral to General Surgery   Diagnoses       Codes Comments    Chronic allergic rhinitis    -  Primary ICD-10-CM: J30.9  ICD-9-CM: 477.9     Mixed hyperlipidemia     ICD-10-CM: E78.2  ICD-9-CM: 272.2     Essential hypertension     ICD-10-CM: I10  ICD-9-CM: 401.9     Urge urinary incontinence     ICD-10-CM: N39.41  ICD-9-CM: 788.31     Healthcare maintenance     ICD-10-CM: Z00.00  ICD-9-CM: V70.0     Reactive depression     ICD-10-CM: F32.9  ICD-9-CM: 300.4     Osteopenia, unspecified location     ICD-10-CM: M85.80  ICD-9-CM: 733.90     History of acute gouty arthritis     ICD-10-CM: Z87.39  ICD-9-CM: V13.4     Skin lesion     ICD-10-CM: L98.9  ICD-9-CM: 709.9

## 2023-06-21 ENCOUNTER — TELEPHONE (OUTPATIENT)
Dept: SURGERY | Facility: CLINIC | Age: 83
End: 2023-06-21

## 2023-06-21 PROBLEM — C44.90 SKIN CANCER: Status: ACTIVE | Noted: 2023-06-21

## 2023-06-21 NOTE — TELEPHONE ENCOUNTER
Caller: JENNIFER YANCEY   Phone Number: [phone]    Reason for Call: PT IS SCHEDULED FOR SKIN LESION EXCISION ON 07/13/23. PT WAS TOLD BY DR. BYRNES THAT IF SHE COULDN'T DO 07/13 THEN 07/14 IS ALSO AVAILABLE.   PT IS REQUESTING TO HAVE SURGERY ON 07/14/23.

## 2023-08-02 ENCOUNTER — OFFICE VISIT (OUTPATIENT)
Dept: SURGERY | Facility: CLINIC | Age: 83
End: 2023-08-02
Payer: MEDICARE

## 2023-08-02 VITALS — HEIGHT: 67 IN | WEIGHT: 195 LBS | BODY MASS INDEX: 30.61 KG/M2

## 2023-08-02 DIAGNOSIS — L98.9 SKIN LESION: Primary | ICD-10-CM

## 2023-08-02 PROCEDURE — 1159F MED LIST DOCD IN RCRD: CPT | Performed by: SURGERY

## 2023-08-02 PROCEDURE — 1160F RVW MEDS BY RX/DR IN RCRD: CPT | Performed by: SURGERY

## 2023-08-02 PROCEDURE — 99024 POSTOP FOLLOW-UP VISIT: CPT | Performed by: SURGERY

## 2023-08-21 ENCOUNTER — TRANSCRIBE ORDERS (OUTPATIENT)
Dept: ADMINISTRATIVE | Facility: HOSPITAL | Age: 83
End: 2023-08-21
Payer: MEDICARE

## 2023-08-21 DIAGNOSIS — Z12.31 VISIT FOR SCREENING MAMMOGRAM: Primary | ICD-10-CM

## 2023-09-01 DIAGNOSIS — Z87.39 HISTORY OF ACUTE GOUTY ARTHRITIS: ICD-10-CM

## 2023-09-01 DIAGNOSIS — I10 ESSENTIAL HYPERTENSION: ICD-10-CM

## 2023-09-01 RX ORDER — LOSARTAN POTASSIUM 100 MG/1
TABLET ORAL
Qty: 90 TABLET | Refills: 3 | Status: SHIPPED | OUTPATIENT
Start: 2023-09-01

## 2023-09-01 RX ORDER — BISOPROLOL FUMARATE 10 MG/1
10 TABLET, FILM COATED ORAL
Qty: 90 TABLET | Refills: 3 | Status: SHIPPED | OUTPATIENT
Start: 2023-09-01

## 2023-09-28 DIAGNOSIS — I10 ESSENTIAL HYPERTENSION: ICD-10-CM

## 2023-09-28 RX ORDER — HYDRALAZINE HYDROCHLORIDE 50 MG/1
TABLET, FILM COATED ORAL
Qty: 180 TABLET | Refills: 3 | Status: SHIPPED | OUTPATIENT
Start: 2023-09-28

## 2023-10-09 ENCOUNTER — OFFICE VISIT (OUTPATIENT)
Dept: FAMILY MEDICINE CLINIC | Facility: CLINIC | Age: 83
End: 2023-10-09
Payer: MEDICARE

## 2023-10-09 VITALS
OXYGEN SATURATION: 98 % | SYSTOLIC BLOOD PRESSURE: 122 MMHG | TEMPERATURE: 98.6 F | BODY MASS INDEX: 29.82 KG/M2 | HEART RATE: 76 BPM | WEIGHT: 190 LBS | RESPIRATION RATE: 14 BRPM | DIASTOLIC BLOOD PRESSURE: 65 MMHG | HEIGHT: 67 IN

## 2023-10-09 DIAGNOSIS — B35.1 ONYCHOMYCOSIS: ICD-10-CM

## 2023-10-09 DIAGNOSIS — Z87.39 HISTORY OF ACUTE GOUTY ARTHRITIS: ICD-10-CM

## 2023-10-09 DIAGNOSIS — Z00.00 HEALTHCARE MAINTENANCE: ICD-10-CM

## 2023-10-09 DIAGNOSIS — Z85.828 HISTORY OF NONMELANOMA SKIN CANCER: ICD-10-CM

## 2023-10-09 DIAGNOSIS — Z23 ENCOUNTER FOR IMMUNIZATION: ICD-10-CM

## 2023-10-09 DIAGNOSIS — I10 ESSENTIAL HYPERTENSION: ICD-10-CM

## 2023-10-09 DIAGNOSIS — N39.41 URGE URINARY INCONTINENCE: ICD-10-CM

## 2023-10-09 DIAGNOSIS — E78.2 MIXED HYPERLIPIDEMIA: ICD-10-CM

## 2023-10-09 DIAGNOSIS — F32.9 REACTIVE DEPRESSION: ICD-10-CM

## 2023-10-09 DIAGNOSIS — M85.80 OSTEOPENIA, UNSPECIFIED LOCATION: ICD-10-CM

## 2023-10-09 DIAGNOSIS — J30.9 CHRONIC ALLERGIC RHINITIS: Primary | ICD-10-CM

## 2023-10-09 PROBLEM — L98.9 SKIN LESION: Status: RESOLVED | Noted: 2017-09-27 | Resolved: 2023-10-09

## 2023-10-09 PROCEDURE — 80061 LIPID PANEL: CPT | Performed by: GENERAL PRACTICE

## 2023-10-09 PROCEDURE — 82306 VITAMIN D 25 HYDROXY: CPT | Performed by: GENERAL PRACTICE

## 2023-10-09 PROCEDURE — 85025 COMPLETE CBC W/AUTO DIFF WBC: CPT | Performed by: GENERAL PRACTICE

## 2023-10-09 PROCEDURE — 3074F SYST BP LT 130 MM HG: CPT | Performed by: GENERAL PRACTICE

## 2023-10-09 PROCEDURE — 3078F DIAST BP <80 MM HG: CPT | Performed by: GENERAL PRACTICE

## 2023-10-09 PROCEDURE — G0008 ADMIN INFLUENZA VIRUS VAC: HCPCS | Performed by: GENERAL PRACTICE

## 2023-10-09 PROCEDURE — 80053 COMPREHEN METABOLIC PANEL: CPT | Performed by: GENERAL PRACTICE

## 2023-10-09 PROCEDURE — 90662 IIV NO PRSV INCREASED AG IM: CPT | Performed by: GENERAL PRACTICE

## 2023-10-09 PROCEDURE — G0439 PPPS, SUBSEQ VISIT: HCPCS | Performed by: GENERAL PRACTICE

## 2023-10-09 PROCEDURE — 84443 ASSAY THYROID STIM HORMONE: CPT | Performed by: GENERAL PRACTICE

## 2023-10-09 PROCEDURE — 1170F FXNL STATUS ASSESSED: CPT | Performed by: GENERAL PRACTICE

## 2023-10-09 PROCEDURE — 99213 OFFICE O/P EST LOW 20 MIN: CPT | Performed by: GENERAL PRACTICE

## 2023-10-09 RX ORDER — TERBINAFINE HYDROCHLORIDE 250 MG/1
250 TABLET ORAL DAILY
Qty: 90 TABLET | Refills: 0 | Status: SHIPPED | OUTPATIENT
Start: 2023-10-09

## 2023-10-09 NOTE — PATIENT INSTRUCTIONS
Fall Prevention in the Home, Adult  Falls can cause injuries and can happen to people of all ages. There are many things you can do to make your home safe and to help prevent falls. Ask for help when making these changes.  What actions can I take to prevent falls?  General Instructions  Use good lighting in all rooms. Replace any light bulbs that burn out.  Turn on the lights in dark areas. Use night-lights.  Keep items that you use often in easy-to-reach places. Lower the shelves around your home if needed.  Set up your furniture so you have a clear path. Avoid moving your furniture around.  Do not have throw rugs or other things on the floor that can make you trip.  Avoid walking on wet floors.  If any of your floors are uneven, fix them.  Add color or contrast paint or tape to clearly jimmy and help you see:  Grab bars or handrails.  First and last steps of staircases.  Where the edge of each step is.  If you use a stepladder:  Make sure that it is fully opened. Do not climb a closed stepladder.  Make sure the sides of the stepladder are locked in place.  Ask someone to hold the stepladder while you use it.  Know where your pets are when moving through your home.  What can I do in the bathroom?         Keep the floor dry. Clean up any water on the floor right away.  Remove soap buildup in the tub or shower.  Use nonskid mats or decals on the floor of the tub or shower.  Attach bath mats securely with double-sided, nonslip rug tape.  If you need to sit down in the shower, use a plastic, nonslip stool.  Install grab bars by the toilet and in the tub and shower. Do not use towel bars as grab bars.  What can I do in the bedroom?  Make sure that you have a light by your bed that is easy to reach.  Do not use any sheets or blankets for your bed that hang to the floor.  Have a firm chair with side arms that you can use for support when you get dressed.  What can I do in the kitchen?  Clean up any spills right away.  If  you need to reach something above you, use a step stool with a grab bar.  Keep electrical cords out of the way.  Do not use floor polish or wax that makes floors slippery.  What can I do with my stairs?  Do not leave any items on the stairs.  Make sure that you have a light switch at the top and the bottom of the stairs.  Make sure that there are handrails on both sides of the stairs. Fix handrails that are broken or loose.  Install nonslip stair treads on all your stairs.  Avoid having throw rugs at the top or bottom of the stairs.  Choose a carpet that does not hide the edge of the steps on the stairs.  Check carpeting to make sure that it is firmly attached to the stairs. Fix carpet that is loose or worn.  What can I do on the outside of my home?  Use bright outdoor lighting.  Fix the edges of walkways and driveways and fix any cracks.  Remove anything that might make you trip as you walk through a door, such as a raised step or threshold.  Trim any bushes or trees on paths to your home.  Check to see if handrails are loose or broken and that both sides of all steps have handrails.  Install guardrails along the edges of any raised decks and porches.  Clear paths of anything that can make you trip, such as tools or rocks.  Have leaves, snow, or ice cleared regularly.  Use sand or salt on paths during winter.  Clean up any spills in your garage right away. This includes grease or oil spills.  What other actions can I take?  Wear shoes that:  Have a low heel. Do not wear high heels.  Have rubber bottoms.  Feel good on your feet and fit well.  Are closed at the toe. Do not wear open-toe sandals.  Use tools that help you move around if needed. These include:  Canes.  Walkers.  Scooters.  Crutches.  Review your medicines with your doctor. Some medicines can make you feel dizzy. This can increase your chance of falling.  Ask your doctor what else you can do to help prevent falls.  Where to find more information  Centers  for Disease Control and Prevention, [place]: www.cdc.gov  [employer] [place] [employer] [place]: www.angi.nih.gov  Contact a doctor if:  You are afraid of falling at home.  You feel weak, drowsy, or dizzy at home.  You fall at home.  Summary  There are many simple things that you can do to make your home safe and to help prevent falls.  Ways to make your home safe include removing things that can make you trip and installing grab bars in the bathroom.  Ask for help when making these changes in your home.  This information is not intended to replace advice given to you by your health care provider. Make sure you discuss any questions you have with your health care provider.  Document Revised: 09/19/2022 Document Reviewed: 07/21/2021  [employer] Patient Education c 2023 [employer]  Mammogram  A mammogram is an X-ray of the breasts. This procedure can screen for and detect any changes that may indicate breast cancer. Mammograms are regularly done beginning at age 40 for women with average risk. A man may have a mammogram if he has a lump or swelling in his breast tissue.  A mammogram can also identify other changes and variations in the breast, such as:  Inflammation of the breast tissue (mastitis).  An infected area that contains a collection of pus (abscess).  A fluid-filled sac (cyst).  Tumors that are not cancerous (benign).  Fibrocystic changes. This is when breast tissue becomes denser and can make the tissue feel rope-like or uneven under the skin.  Women at higher risk for breast cancer need earlier and more comprehensive screening for abnormal changes. Breast tomosynthesis, or three-dimensional (3D) mammography, and digital breast tomosynthesis are advanced forms of imaging that create 3D pictures of the breasts.  Tell a health care provider:  About any allergies you have.  If you have breast implants.  If you have had previous breast disease, biopsy, or surgery.  If you have a family history of breast cancer.  If you  are breastfeeding.  Whether you are pregnant or may be pregnant.  What are the risks?  Generally, this is a safe procedure. However, problems may occur, including:  Exposure to radiation. Radiation levels are very low with this test.  The need for more tests.  The mammogram fails to detect certain cancers or the results are misinterpreted.  Difficulty with detecting breast cancer in women with dense breasts.  What happens before the procedure?  Schedule your test about 1-2 weeks after your menstrual period if you are menstruating. This is usually when your breasts are the least tender.  If you have had a mammogram done at a different facility in the past, get the mammogram X-rays or have them sent to your current exam facility. The new and old images will be compared.  Wash your breasts and underarms on the day of the test.  Do not wear deodorants, perfumes, lotions, or powders anywhere on your body on the day of the test.  Remove any jewelry from your neck.  Wear clothes that you can change into and out of easily.  What happens during the procedure?    You will undress from the waist up and put on a gown that opens in the front.  You will  front of the X-ray machine.  Each breast will be placed between two plastic or glass plates. The plates will compress your breast for a few seconds. Try to stay as relaxed as possible. This procedure does not cause any harm to your breasts. Any discomfort you feel will be very brief.  X-rays will be taken from different angles of each breast.  The procedure may vary among health care providers and hospitals.  What can I expect after the procedure?  The mammogram will be examined by a specialist (radiologist).  You may need to repeat certain parts of the test, depending on the quality of the images. This is done if the radiologist needs a better view of the breast tissue.  You may resume your normal activities.  It is up to you to get the results of your procedure. Ask  your health care provider, or the department that is doing the procedure, when your results will be ready.  Summary  A mammogram is an X-ray of the breasts. This procedure can screen for and detect any changes that may indicate breast cancer.  A man may have a mammogram if he has a lump or swelling in his breast tissue.  If you have had a mammogram done at a different facility in the past, get the mammogram X-rays or have them sent to your current exam facility in order to compare them.  Schedule your test about 1-2 weeks after your menstrual period if you are menstruating.  Ask when your test results will be ready. Make sure you get your test results.  This information is not intended to replace advice given to you by your health care provider. Make sure you discuss any questions you have with your health care provider.  Document Revised: 08/31/2022 Document Reviewed: 10/18/2021  [employer] Patient Education c 2023 [employer]  Colorectal Cancer Screening    Colorectal cancer screening is a group of tests that are used to check for colorectal cancer before symptoms develop. Colorectal refers to the colon and rectum. The colon and rectum are located at the end of the digestive tract and carry stool (feces) out of the body.  Who should have screening?  All adults who are 45-75 years old should have screening. Your health care provider may recommend screening before age 45. You will have tests every 1-10 years, depending on your results and the type of screening test. Screening recommendations for adults who are 76-85 years old vary depending on a person's health. People older than age 85 should no longer get colorectal cancer screening.  You may have screening tests starting before age 45, or more often than other people, if you have any of these risk factors:  A personal or family history of colorectal cancer or abnormal growths known as polyps in your colon.  Inflammatory bowel disease, such as ulcerative colitis  or Crohn's disease.  A history of having radiation treatment to the abdomen or the area between the hip bones (pelvic area) for cancer.  A type of genetic syndrome that is passed from parent to child (hereditary), such as:  Posey syndrome.  Familial adenomatous polyposis.  Turcot syndrome.  Peutz-Jeghers syndrome.  MUTYH-associated polyposis (MAP).  A personal history of diabetes.  Types of tests  There are several types of colorectal screening tests. You may have one or more of the following:  Guaiac-based fecal occult blood testing. For this test, a stool sample is checked for hidden (occult) blood, which could be a sign of colorectal cancer.  Fecal immunochemical test (FIT). For this test, a stool sample is checked for blood, which could be a sign of colorectal cancer.  Stool DNA test. For this test, a stool sample is checked for blood and changes in DNA that could lead to colorectal cancer.  Sigmoidoscopy. During this test, a thin, flexible tube with a camera on the end, called a sigmoidoscope, is used to examine the rectum and the lower colon.  Colonoscopy. During this test, a long, flexible tube with a camera on the end, called a colonoscope, is used to examine the entire colon and rectum. Also, sometimes a tissue sample is taken to be looked at under a microscope (biopsy) or small polyps are removed during this test.  Virtual colonoscopy. Instead of a colonoscope, this type of colonoscopy uses a CT scan to take pictures of the colon and rectum. A CT scan is a type of X-ray that is made using computers.  What are the benefits of screening?  Screening reduces your risk for colorectal cancer and can help identify cancer at an early stage, when the cancer can be removed or treated more easily. It is common for polyps to form in the lining of the colon, especially as you age. These polyps may be cancerous or become cancerous over time. Screening can identify these polyps.  What are the risks of  screening?  Generally, these are safe tests. However, problems may occur, including:  The need for more tests to confirm results from a stool sample test. Stool sample tests have fewer risks than other types of screening tests.  Being exposed to low levels of radiation, if you had a test involving X-rays. This may slightly increase your cancer risk. The benefit of detecting cancer outweighs the slight increase in risk.  Bleeding, damage to the intestine, or infection caused by a sigmoidoscopy or colonoscopy.  A reaction to medicines given during a sigmoidoscopy or colonoscopy.  Talk with your health care provider to understand your risk for colorectal cancer and to make a screening plan that is right for you.  Questions to ask your health care provider  When should I start colorectal cancer screening?  What is my risk for colorectal cancer?  How often do I need screening?  Which screening tests do I need?  How do I get my test results?  What do my results mean?  Where to find more information  Learn more about colorectal cancer screening from:  The American Cancer Society: cancer.org  National Cancer [place]: cancer.gov  Summary  Colorectal cancer screening is a group of tests used to check for colorectal cancer before symptoms develop.  All adults who are 45-75 years old should have screening. Your health care provider may recommend screening before age 45.  You may have screening tests starting before age 45, or more often than other people, if you have certain risk factors.  Screening reduces your risk for colorectal cancer and can help identify cancer at an early stage, when the cancer can be removed or treated more easily.  Talk with your health care provider to understand your risk for colorectal cancer and to make a screening plan that is right for you.  This information is not intended to replace advice given to you by your health care provider. Make sure you discuss any questions you have with your health  care provider.  Document Revised: 04/07/2021 Document Reviewed: 04/07/2021  [employer] Patient Education c 2023 [employer]

## 2023-10-09 NOTE — PROGRESS NOTES
The ABCs of the Annual Wellness Visit  Subsequent Medicare Wellness Visit    Chief Complaint  Subsequent Medicare Wellness Visit    Subjective    History of Present Illness:  Rylee Hull is a 83 y.o. female who presents for a Subsequent Medicare Wellness Visit.    Essential hypertension  She continues to deny any chest pain, palpitations, dyspnea, orthopnea, paroxysmal nocturnal dyspnea or peripheral edema.  She remains on bisoprolol, losartan, and hydralazine.     Hyperlipidemia  Her compliance with treatment remains good.  She is following a reasonable diet and exercises some daily. She is currently being prescribed the following medication for her dyslipidemia - lifestyle modifcation.  She has had no recent labs but is fasting today    Gouty Arthritis  She denies any attacks since last here and has not required any colchicine.  She has a family history of gout.      Urinary Incontinence  She has a long history of urinary frequency, nocturia x 1-2, urgency and occasional incontinence if she does not get to the bathroom quick enough. She continues to deny any dysuria, hematuria, or incontinence with coughing, sneezing, lifting, pushing or pulling.  There is no history of any fecal incontinence.  She continues to feel that fesoterodine has helped with no apparent side effects     Chronic Allergic Rhinitis   She has a long history of intermittent rhinorrhea, nasal congestion, and postnasal drip. When severe these symptoms are associated with a dry cough. There is no history of any other upper respiratory tract symptoms and she continues to deny any chest pain, shortness of breath, hemoptysis, fever, or chills. She remains on nasal fluticasone, cetirizine, and montelukast and feels that all three help some     Depression  She continues to experience intermittent depression, nervousness, difficulty sleeping, and fatigue. There has been no change in her appetite and she continues to deny any suicidal ideation.   She is on no medication for this    Discolored Toenail  She gives an approximate 6 month history of discoloration and thickening of the right great toenail.  This has not occurred elsewhere, and she denies any other associated symptoms.    Previous Nonmelanoma Skin Cancer  She has had no problems with the excision site over her left cheek and is pleased with the results.  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  She denies any change in the lesion over her right jawline, or any new lesions    The following portions of the patient's history were reviewed and   updated as appropriate: allergies, current medications, past family history, past medical history, past social history, past surgical history, and problem list.    Compared to one year ago, the patient feels her physical   health is the same.    Compared to one year ago, the patient feels her mental   health is the same.    Recent Hospitalizations:  She was not admitted to the hospital during the last year.     Current Medical Providers:  Patient Care Team:  Jorge Boss MD as PCP - General (Family Medicine)    Outpatient Medications Prior to Visit   Medication Sig Dispense Refill    acetaminophen (TYLENOL) 325 MG tablet Take 2 tablets by mouth Every 4 (Four) Hours As Needed for Mild Pain. 30 tablet 0    bisoprolol (ZEBeta) 10 MG tablet TAKE 1 TABLET BY MOUTH EVERY NIGHT AT BEDTIME. 90 tablet 3    fesoterodine fumarate (Toviaz) 8 MG tablet sustained-release 24 hour tablet Take 1 tablet by mouth Daily. 90 tablet 5    fluticasone (FLONASE) 50 MCG/ACT nasal spray 2 sprays into the nostril(s) as directed by provider 2 (Two) Times a Day. USE 2 SPRAYS IN EACH NOSTRIL ONCE DAILY 48 g 3    hydrALAZINE (APRESOLINE) 50 MG tablet TAKE ONE TABLET BY MOUTH TWICE DAILY 180 tablet 3    ibuprofen (ADVIL,MOTRIN) 600 MG tablet Take 1 tablet by mouth Every 6 (Six) Hours As Needed for Mild Pain. 30 tablet 0    losartan  (COZAAR) 100 MG tablet TAKE ONE TABLET BY MOUTH EVERY MORNING 90 tablet 3    vitamin D3 125 MCG (5000 UT) capsule capsule Take 1 capsule by mouth Daily.      acetaminophen (TYLENOL) 650 MG 8 hr tablet Take 1 tablet by mouth Every 8 (Eight) Hours As Needed for Mild Pain.      traMADol (ULTRAM) 50 MG tablet Take 1 tablet by mouth Every 12 (Twelve) Hours As Needed for Moderate Pain. 6 tablet 0     No facility-administered medications prior to visit.     No opioid medication identified on active medication list. I have reviewed chart for other potential  high risk medication/s and harmful drug interactions in the elderly.        Aspirin is not on active medication list.  Aspirin use is not indicated based on review of current medical condition/s. Risk of harm outweighs potential benefits.  .    Patient Active Problem List   Diagnosis    Osteopenia    Mixed hyperlipidemia    Essential hypertension    Chronic allergic rhinitis    Encounter for screening mammogram for breast cancer    Healthcare maintenance    Encounter for immunization    History of acute gouty arthritis    Urge urinary incontinence    Reactive depression    History of nonmelanoma skin cancer    Onychomycosis     Advance Care Planning  Advance Directive is on file.  ACP discussion was held with the patient during this visit. Patient has an advance directive in EMR which is still valid.     Review of Systems   Constitutional:  Positive for fatigue. Negative for appetite change, chills, diaphoresis and fever.   HENT:  Positive for rhinorrhea and sneezing. Negative for congestion, ear pain, postnasal drip, sinus pressure, sore throat and tinnitus.    Eyes:  Negative for visual disturbance.   Respiratory:  Negative for cough, shortness of breath and wheezing.    Cardiovascular:  Negative for chest pain, palpitations and leg swelling.   Gastrointestinal:  Negative for abdominal pain, blood in stool, constipation, diarrhea, nausea and vomiting.   Genitourinary:  " Positive for frequency and urgency. Negative for dysuria and hematuria.   Musculoskeletal:  Negative for arthralgias, back pain, joint swelling and myalgias.   Skin:  Negative for rash.        Discolored right great toenail   Neurological:  Negative for weakness, numbness and confusion.   Psychiatric/Behavioral:  Negative for decreased concentration, dysphoric mood, sleep disturbance and suicidal ideas. The patient is nervous/anxious.         Objective    Vitals:    10/09/23 0922   BP: 122/65   Pulse: 76   Resp: 14   Temp: 98.6 øF (37 øC)   TempSrc: Temporal   SpO2: 98%   Weight: 86.2 kg (190 lb)   Height: 170.2 cm (67\")     Estimated body mass index is 29.76 kg/mý as calculated from the following:    Height as of this encounter: 170.2 cm (67\").    Weight as of this encounter: 86.2 kg (190 lb).    Does the patient have evidence of cognitive impairment? No    Physical Exam  Constitutional:       General: She is not in acute distress.     Appearance: Normal appearance. She is well-developed. She is not diaphoretic.      Comments: Bright and in good spirits. No apparent distress. No pallor, jaundice, diaphoresis, or cyanosis.   HENT:      Head: Atraumatic.      Right Ear: Tympanic membrane, ear canal and external ear normal.      Left Ear: Tympanic membrane, ear canal and external ear normal.      Mouth/Throat:      Lips: No lesions.      Mouth: Mucous membranes are moist. No oral lesions.      Pharynx: No oropharyngeal exudate or posterior oropharyngeal erythema.   Eyes:      General: Lids are normal.      Extraocular Movements: Extraocular movements intact.      Conjunctiva/sclera: Conjunctivae normal.      Pupils: Pupils are equal.   Neck:      Thyroid: No thyroid mass or thyromegaly.      Vascular: No carotid bruit or JVD.      Trachea: Trachea normal. No tracheal deviation.   Cardiovascular:      Rate and Rhythm: Normal rate and regular rhythm.      Heart sounds: Normal heart sounds, S1 normal and S2 normal. No " murmur heard.     No gallop.   Pulmonary:      Effort: Pulmonary effort is normal.      Breath sounds: Normal breath sounds.   Abdominal:      General: Bowel sounds are normal. There is no distension or abdominal bruit.      Palpations: Abdomen is soft. There is no hepatomegaly, splenomegaly or mass.      Tenderness: There is no abdominal tenderness.      Hernia: No hernia is present.   Musculoskeletal:      Right lower leg: No edema.      Left lower leg: No edema.      Comments: No peripheral joint redness or warmth.   Lymphadenopathy:      Head:      Right side of head: No submental, submandibular, tonsillar, preauricular, posterior auricular or occipital adenopathy.      Left side of head: No submental, submandibular, tonsillar, preauricular, posterior auricular or occipital adenopathy.      Cervical: No cervical adenopathy.      Upper Body:      Right upper body: No supraclavicular adenopathy.      Left upper body: No supraclavicular adenopathy.   Skin:     General: Skin is warm.      Coloration: Skin is not cyanotic, jaundiced or pale.      Findings: Lesion (12 mm slightly irregular well demarcated erythematous patch right medial jawline) present. No rash.      Nails: There is no clubbing.      Comments: Whitish discoloration with thickening of the distal half of the right great toenail   Neurological:      Mental Status: She is alert and oriented to person, place, and time.      Cranial Nerves: No cranial nerve deficit, dysarthria or facial asymmetry.      Sensory: No sensory deficit.      Motor: No tremor.      Coordination: Coordination normal.      Gait: Gait normal.   Psychiatric:         Attention and Perception: Attention normal.         Mood and Affect: Mood normal.         Speech: Speech normal.         Behavior: Behavior normal.         Thought Content: Thought content normal. Thought content does not include suicidal ideation.       HEALTH RISK ASSESSMENT    Smoking Status:  Social History      Tobacco Use   Smoking Status Never    Passive exposure: Never   Smokeless Tobacco Never     Alcohol Consumption:  Social History     Substance and Sexual Activity   Alcohol Use No     Fall Risk Screen:  STEADI Fall Risk Assessment was completed, and patient is at LOW risk for falls.Assessment completed on:10/9/2023    Depression Screening:      10/9/2023     9:19 AM   PHQ-2/PHQ-9 Depression Screening   Little Interest or Pleasure in Doing Things 0-->not at all   Feeling Down, Depressed or Hopeless 0-->not at all   PHQ-9: Brief Depression Severity Measure Score 0     Health Habits and Functional and Cognitive Screening:      10/9/2023     9:19 AM   Functional & Cognitive Status   Do you have difficulty preparing food and eating? No   Do you have difficulty bathing yourself, getting dressed or grooming yourself? No   Do you have difficulty using the toilet? No   Do you have difficulty moving around from place to place? No   Do you have trouble with steps or getting out of a bed or a chair? No   Current Diet Well Balanced Diet   Dental Exam Not up to date   Eye Exam Not up to date   Exercise (times per week) 0 times per week   Current Exercises Include No Regular Exercise   Do you need help using the phone?  No   Are you deaf or do you have serious difficulty hearing?  No   Do you need help to go to places out of walking distance? No   Do you need help shopping? No   Do you need help preparing meals?  No   Do you need help with housework?  No   Do you need help with laundry? No   Do you need help taking your medications? No   Do you need help managing money? No   Do you ever drive or ride in a car without wearing a seat belt? No   Have you felt unusual stress, anger or loneliness in the last month? No   Who do you live with? Other   If you need help, do you have trouble finding someone available to you? No   Have you been bothered in the last four weeks by sexual problems? No   Do you have difficulty concentrating,  remembering or making decisions? No     Age-appropriate Screening Schedule:  Refer to the list below for future screening recommendations based on patient's age, sex and/or medical conditions. Orders for these recommended tests are listed in the plan section. The patient has been provided with a written plan.    Health Maintenance   Topic Date Due    ZOSTER VACCINE (3 of 3) 02/04/2020    COVID-19 Vaccine (6 - 2023-24 season) 09/01/2023    DXA SCAN  09/13/2023    LIPID PANEL  11/17/2023    COLORECTAL CANCER SCREENING  04/21/2024    BMI FOLLOWUP  06/21/2024    ANNUAL WELLNESS VISIT  10/09/2024    TDAP/TD VACCINES (2 - Td or Tdap) 03/25/2029    INFLUENZA VACCINE  Completed    Pneumococcal Vaccine 65+  Completed        Assessment & Plan   CMS Preventative Services Quick Reference  Risk Factors Identified During Encounter  Depression/Dysphoria:  Doing well. Supportive therapy  Immunizations Discussed/Encouraged: Influenza, COVID19, and RSV  The above risks/problems have been discussed with the patient.  Follow up actions/plans if indicated are seen below in the Assessment/Plan Section.  Pertinent information has been shared with the patient in the After Visit Summary.    Diagnoses and all orders for this visit:    1. Chronic allergic rhinitis     2. Essential hypertension   Hypertension: at goal. Evidence of target organ damage: none.  Encouraged to continue to work on diet and exercise plan.   Continue current medication  -     CBC & Differential  -     Comprehensive Metabolic Panel    3. Mixed hyperlipidemia  As above.   Continue current medication.  Updated labs drawn.  -     Comprehensive Metabolic Panel  -     Lipid Panel    4. Urge urinary incontinence    5. Healthcare maintenance  Flu shot administered.  Recommended an updated COVID-19 vaccination.  Reviewed the potential benefits of RSV vaccination.  Reminded to follow-up with her scheduled mammogram  Reviewed the potential benefits and risks of an updated  screening colonoscopy in the spring.  Patient will consider this to be discussed further at her return  -     Fluzone High-Dose 65+yrs    6. Reactive depression  Doing well  Encouraged to report if this should change.  -     TSH    7. History of acute gouty arthritis    8. Osteopenia, unspecified location  -     TSH  -     Vitamin D,25-Hydroxy    9. History of nonmelanoma skin cancer  New problem  Patient uninterested in a diagnostic punch biopsy of the lesion over her right jawline, but will let us know if it should change in any way  Advise regarding solar protection    10. Onychomycosis  Right great toenail  Reviewed options and agreed on a course of terbinafine  Encouraged to report if any worse or if any new symptoms or concerns  -     terbinafine (LamISIL) 250 MG tablet; Take 1 tablet by mouth Daily.  Dispense: 90 tablet; Refill: 0        Follow Up:   Return in about 4 months (around 2/9/2024).     An After Visit Summary and PPPS were made available to the patient.

## 2023-10-10 LAB
25(OH)D3 SERPL-MCNC: 53.7 NG/ML (ref 30–100)
ALBUMIN SERPL-MCNC: 4.6 G/DL (ref 3.5–5.2)
ALBUMIN/GLOB SERPL: 1.7 G/DL
ALP SERPL-CCNC: 95 U/L (ref 39–117)
ALT SERPL W P-5'-P-CCNC: 18 U/L (ref 1–33)
ANION GAP SERPL CALCULATED.3IONS-SCNC: 16.6 MMOL/L (ref 5–15)
AST SERPL-CCNC: 22 U/L (ref 1–32)
BASOPHILS # BLD AUTO: 0.04 10*3/MM3 (ref 0–0.2)
BASOPHILS NFR BLD AUTO: 0.6 % (ref 0–1.5)
BILIRUB SERPL-MCNC: 0.4 MG/DL (ref 0–1.2)
BUN SERPL-MCNC: 12 MG/DL (ref 8–23)
BUN/CREAT SERPL: 19 (ref 7–25)
CALCIUM SPEC-SCNC: 9.9 MG/DL (ref 8.6–10.5)
CHLORIDE SERPL-SCNC: 103 MMOL/L (ref 98–107)
CHOLEST SERPL-MCNC: 202 MG/DL (ref 0–200)
CO2 SERPL-SCNC: 25.4 MMOL/L (ref 22–29)
CREAT SERPL-MCNC: 0.63 MG/DL (ref 0.57–1)
DEPRECATED RDW RBC AUTO: 42.5 FL (ref 37–54)
EGFRCR SERPLBLD CKD-EPI 2021: 88.7 ML/MIN/1.73
EOSINOPHIL # BLD AUTO: 0.08 10*3/MM3 (ref 0–0.4)
EOSINOPHIL NFR BLD AUTO: 1.2 % (ref 0.3–6.2)
ERYTHROCYTE [DISTWIDTH] IN BLOOD BY AUTOMATED COUNT: 13.5 % (ref 12.3–15.4)
GLOBULIN UR ELPH-MCNC: 2.7 GM/DL
GLUCOSE SERPL-MCNC: 94 MG/DL (ref 65–99)
HCT VFR BLD AUTO: 42.7 % (ref 34–46.6)
HDLC SERPL-MCNC: 71 MG/DL (ref 40–60)
HGB BLD-MCNC: 14.6 G/DL (ref 12–15.9)
IMM GRANULOCYTES # BLD AUTO: 0.01 10*3/MM3 (ref 0–0.05)
IMM GRANULOCYTES NFR BLD AUTO: 0.2 % (ref 0–0.5)
LDLC SERPL CALC-MCNC: 114 MG/DL (ref 0–100)
LDLC/HDLC SERPL: 1.57 {RATIO}
LYMPHOCYTES # BLD AUTO: 2.03 10*3/MM3 (ref 0.7–3.1)
LYMPHOCYTES NFR BLD AUTO: 31.1 % (ref 19.6–45.3)
MCH RBC QN AUTO: 29.7 PG (ref 26.6–33)
MCHC RBC AUTO-ENTMCNC: 34.2 G/DL (ref 31.5–35.7)
MCV RBC AUTO: 86.8 FL (ref 79–97)
MONOCYTES # BLD AUTO: 0.41 10*3/MM3 (ref 0.1–0.9)
MONOCYTES NFR BLD AUTO: 6.3 % (ref 5–12)
NEUTROPHILS NFR BLD AUTO: 3.95 10*3/MM3 (ref 1.7–7)
NEUTROPHILS NFR BLD AUTO: 60.6 % (ref 42.7–76)
NRBC BLD AUTO-RTO: 0 /100 WBC (ref 0–0.2)
PLATELET # BLD AUTO: 312 10*3/MM3 (ref 140–450)
PMV BLD AUTO: 9.5 FL (ref 6–12)
POTASSIUM SERPL-SCNC: 4.1 MMOL/L (ref 3.5–5.2)
PROT SERPL-MCNC: 7.3 G/DL (ref 6–8.5)
RBC # BLD AUTO: 4.92 10*6/MM3 (ref 3.77–5.28)
SODIUM SERPL-SCNC: 145 MMOL/L (ref 136–145)
TRIGL SERPL-MCNC: 97 MG/DL (ref 0–150)
TSH SERPL DL<=0.05 MIU/L-ACNC: 1.08 UIU/ML (ref 0.27–4.2)
VLDLC SERPL-MCNC: 17 MG/DL (ref 5–40)
WBC NRBC COR # BLD: 6.52 10*3/MM3 (ref 3.4–10.8)

## 2023-10-19 ENCOUNTER — HOSPITAL ENCOUNTER (OUTPATIENT)
Dept: MAMMOGRAPHY | Facility: HOSPITAL | Age: 83
Discharge: HOME | End: 2023-10-19
Admitting: GENERAL PRACTICE
Payer: MEDICARE

## 2023-10-19 DIAGNOSIS — Z12.31 VISIT FOR SCREENING MAMMOGRAM: ICD-10-CM

## 2023-10-19 PROCEDURE — 77063 BREAST TOMOSYNTHESIS BI: CPT

## 2023-10-19 PROCEDURE — 77067 SCR MAMMO BI INCL CAD: CPT

## 2023-11-01 ENCOUNTER — HOSPITAL ENCOUNTER (OUTPATIENT)
Dept: MAMMOGRAPHY | Facility: HOSPITAL | Age: 83
Discharge: HOME | End: 2023-11-01
Admitting: RADIOLOGY
Payer: MEDICARE

## 2023-11-01 DIAGNOSIS — R92.8 ABNORMAL MAMMOGRAM: ICD-10-CM

## 2023-11-01 PROCEDURE — 77065 DX MAMMO INCL CAD UNI: CPT

## 2023-11-01 PROCEDURE — 77065 DX MAMMO INCL CAD UNI: CPT | Performed by: RADIOLOGY

## 2023-12-28 DIAGNOSIS — J30.9 CHRONIC ALLERGIC RHINITIS: ICD-10-CM

## 2023-12-28 RX ORDER — MONTELUKAST SODIUM 10 MG/1
TABLET ORAL
Qty: 90 TABLET | Refills: 3 | Status: SHIPPED | OUTPATIENT
Start: 2023-12-28

## 2024-02-09 ENCOUNTER — OFFICE VISIT (OUTPATIENT)
Dept: FAMILY MEDICINE CLINIC | Facility: CLINIC | Age: 84
End: 2024-02-09
Payer: MEDICARE

## 2024-02-09 DIAGNOSIS — Z00.00 HEALTHCARE MAINTENANCE: ICD-10-CM

## 2024-02-09 DIAGNOSIS — F32.9 REACTIVE DEPRESSION: ICD-10-CM

## 2024-02-09 DIAGNOSIS — Z85.828 HISTORY OF NONMELANOMA SKIN CANCER: ICD-10-CM

## 2024-02-09 DIAGNOSIS — J30.9 CHRONIC ALLERGIC RHINITIS: Primary | ICD-10-CM

## 2024-02-09 DIAGNOSIS — I10 ESSENTIAL HYPERTENSION: ICD-10-CM

## 2024-02-09 DIAGNOSIS — N39.41 URGE URINARY INCONTINENCE: ICD-10-CM

## 2024-02-09 DIAGNOSIS — E78.2 MIXED HYPERLIPIDEMIA: ICD-10-CM

## 2024-02-09 DIAGNOSIS — M85.80 OSTEOPENIA, UNSPECIFIED LOCATION: ICD-10-CM

## 2024-02-09 DIAGNOSIS — Z87.39 HISTORY OF ACUTE GOUTY ARTHRITIS: ICD-10-CM

## 2024-02-09 NOTE — PROGRESS NOTES
Shari Hull is a 83 y.o. female.     Chief Complaint  She returns for a scheduled reassessment of multiple medical problems including essential hypertension, hyperlipidemia, previous gouty arthritis, urinary incontinence, chronic allergic rhinitis, and depression    History of Present Illness     Essential hypertension  She continues to deny any chest pain, palpitations, dyspnea, orthopnea, paroxysmal nocturnal dyspnea or peripheral edema.  She remains on bisoprolol, losartan, and hydralazine.   Lab Results   Component Value Date    GLUCOSE 94 10/09/2023    BUN 12 10/09/2023    CREATININE 0.63 10/09/2023    EGFR 88.7 10/09/2023    BCR 19.0 10/09/2023    K 4.1 10/09/2023    CO2 25.4 10/09/2023    CALCIUM 9.9 10/09/2023    ALBUMIN 4.6 10/09/2023    BILITOT 0.4 10/09/2023    AST 22 10/09/2023    ALT 18 10/09/2023     Lab Results   Component Value Date    ALKPHOS 95 10/09/2023     Hyperlipidemia  Her compliance with treatment remains good.  She is following a reasonable diet and exercises some daily. She is on no medication for this  Lab Results   Component Value Date    CHOL 202 (H) 10/09/2023    CHLPL 204 (H) 09/01/2015    TRIG 97 10/09/2023    HDL 71 (H) 10/09/2023     (H) 10/09/2023     Gouty Arthritis  She denies any attacks since last here and has not required any colchicine.  She has a family history of gout.      Urinary Incontinence  She has a long history of urinary frequency, nocturia x 1-2, urgency and occasional incontinence if she does not get to the bathroom quick enough. She continues to deny any dysuria, hematuria, or incontinence with coughing, sneezing, lifting, pushing or pulling.  There is no history of any fecal incontinence.  She remains on fesoterodine with no apparent side effects     Chronic Allergic Rhinitis   She has a long history of intermittent rhinorrhea, nasal congestion, and postnasal drip. When severe these symptoms have been associated with a dry cough.  There is no history of any other upper respiratory tract symptoms, and she continues to deny any chest pain, shortness of breath, hemoptysis, fever, or chills. She remains on nasal fluticasone, cetirizine, and montelukast and feels that all three help some     Depression  She has noted an improvement in her depression, nervousness, sleep, and daytime energy levels. There has been no change in her appetite and she continues to deny any suicidal ideation.  She is on no medication for this  Lab Results   Component Value Date    TSH 1.080 10/09/2023     Previous Nonmelanoma Skin Cancer  She denies any change in the lesion over her right jawline, or any new lesions    Labs  Most recent vitamin D 53.7  Lab Results   Component Value Date    WBC 6.52 10/09/2023    HGB 14.6 10/09/2023    HCT 42.7 10/09/2023    MCV 86.8 10/09/2023     10/09/2023     The following portions of the patient's history were reviewed and updated as appropriate: allergies, current medications, past medical history, past social history, and problem list.    Review of Systems   Constitutional:  Positive for fatigue. Negative for chills and fever.   HENT:  Positive for rhinorrhea and sneezing. Negative for congestion, ear pain, sore throat and voice change.    Eyes:  Negative for visual disturbance.   Respiratory:  Negative for cough, shortness of breath and wheezing.    Cardiovascular:  Negative for chest pain, palpitations and leg swelling.   Gastrointestinal:  Negative for abdominal pain, blood in stool, constipation, diarrhea, nausea and vomiting.   Genitourinary:  Positive for frequency and urgency. Negative for dysuria and hematuria.   Musculoskeletal:  Negative for arthralgias, back pain, joint swelling and myalgias.   Skin:  Negative for rash.   Neurological:  Negative for dizziness, weakness and numbness.   Psychiatric/Behavioral:  Negative for sleep disturbance, suicidal ideas and depressed mood. The patient is nervous/anxious.       Objective   Physical Exam  Constitutional:       General: She is not in acute distress.     Appearance: Normal appearance. She is well-developed. She is not diaphoretic.      Comments: Bright and in good spirits. No apparent distress. No pallor, jaundice, diaphoresis, or cyanosis.   HENT:      Head: Atraumatic.      Right Ear: Tympanic membrane, ear canal and external ear normal.      Left Ear: Tympanic membrane, ear canal and external ear normal.      Mouth/Throat:      Lips: No lesions.      Mouth: Mucous membranes are moist. No oral lesions.      Pharynx: No oropharyngeal exudate or posterior oropharyngeal erythema.   Eyes:      General: Lids are normal.      Extraocular Movements: Extraocular movements intact.      Conjunctiva/sclera: Conjunctivae normal.      Pupils: Pupils are equal.   Neck:      Thyroid: No thyroid mass or thyromegaly.      Vascular: No carotid bruit or JVD.      Trachea: Trachea normal. No tracheal deviation.   Cardiovascular:      Rate and Rhythm: Normal rate and regular rhythm.      Heart sounds: Normal heart sounds, S1 normal and S2 normal. No murmur heard.     No gallop.   Pulmonary:      Effort: Pulmonary effort is normal.      Breath sounds: Normal breath sounds.   Abdominal:      General: Bowel sounds are normal. There is no distension.   Musculoskeletal:      Right lower leg: No edema.      Left lower leg: No edema.      Comments: No peripheral joint redness or warmth.   Lymphadenopathy:      Head:      Right side of head: No submental, submandibular, tonsillar, preauricular, posterior auricular or occipital adenopathy.      Left side of head: No submental, submandibular, tonsillar, preauricular, posterior auricular or occipital adenopathy.      Cervical: No cervical adenopathy.      Upper Body:      Right upper body: No supraclavicular adenopathy.      Left upper body: No supraclavicular adenopathy.   Skin:     General: Skin is warm.      Coloration: Skin is not cyanotic,  jaundiced or pale.      Findings: Lesion (12 mm slightly irregular well demarcated erythematous patch right medial jawline) present. No rash.      Nails: There is no clubbing.      Comments: Whitish discoloration with thickening of the distal half of the right great toenail   Neurological:      Mental Status: She is alert and oriented to person, place, and time.      Cranial Nerves: No cranial nerve deficit, dysarthria or facial asymmetry.      Sensory: No sensory deficit.      Motor: No tremor.      Coordination: Coordination normal.      Gait: Gait normal.   Psychiatric:         Attention and Perception: Attention normal.         Mood and Affect: Mood normal.         Speech: Speech normal.         Behavior: Behavior normal.         Thought Content: Thought content normal. Thought content does not include suicidal ideation.       Assessment & Plan   Problems Addressed this Visit          Allergies and Adverse Reactions    Chronic allergic rhinitis   Continue current medication  Encouraged to report if any worse or if any new symptoms or concerns.    Relevant Medications    fluticasone (FLONASE) 50 MCG/ACT nasal spray    montelukast (SINGULAIR) 10 MG tablet       Cardiac and Vasculature    Essential hypertension   Hypertension: at goal. Evidence of target organ damage: none.  Encouraged to continue to work on diet and exercise plan.   Continue current medication    Relevant Medications    bisoprolol (ZEBeta) 10 MG tablet    hydrALAZINE (APRESOLINE) 50 MG tablet    losartan (COZAAR) 100 MG tablet    Mixed hyperlipidemia  As above.  Will continue to monitor  Updated labs will be arranged at return.       Genitourinary and Reproductive     Urge urinary incontinence  Continue current medication  Encouraged to report if any worse or if any new symptoms or concerns.    Relevant Medications    fesoterodine fumarate (Toviaz) 8 MG tablet sustained-release 24 hour tablet       Health Encounters    Healthcare  maintenance  Recommended RSV vaccination  Reviewed the potential benefits and risks of an updated colonoscopy.  Patient like to consider and this will be discussed again at her return       Hematology and Neoplasia    History of nonmelanoma skin cancer  Encouraged report if any changes in her skin       Mental Health    Reactive depression  Significant situational component.   Supportive therapy.   Encouraged to report if any worse or if any new symptoms or concerns.       Musculoskeletal and Injuries    History of acute gouty arthritis            Osteopenia  Encouraged to continue to pursue weight bearing activities while exercising joint protection.  Will consider an updated DEXA scan with her mammogram later this year     Diagnoses         Codes Comments    Chronic allergic rhinitis    -  Primary ICD-10-CM: J30.9  ICD-9-CM: 477.9     Mixed hyperlipidemia     ICD-10-CM: E78.2  ICD-9-CM: 272.2     Essential hypertension     ICD-10-CM: I10  ICD-9-CM: 401.9     Urge urinary incontinence     ICD-10-CM: N39.41  ICD-9-CM: 788.31     Healthcare maintenance     ICD-10-CM: Z00.00  ICD-9-CM: V70.0     History of nonmelanoma skin cancer     ICD-10-CM: Z85.828  ICD-9-CM: V10.83     Reactive depression     ICD-10-CM: F32.9  ICD-9-CM: 300.4     Osteopenia, unspecified location     ICD-10-CM: M85.80  ICD-9-CM: 733.90     History of acute gouty arthritis     ICD-10-CM: Z87.39  ICD-9-CM: V13.4

## 2024-02-10 VITALS
DIASTOLIC BLOOD PRESSURE: 61 MMHG | BODY MASS INDEX: 30.13 KG/M2 | WEIGHT: 192 LBS | OXYGEN SATURATION: 99 % | HEART RATE: 84 BPM | HEIGHT: 67 IN | SYSTOLIC BLOOD PRESSURE: 145 MMHG | RESPIRATION RATE: 14 BRPM | TEMPERATURE: 98.6 F

## 2024-02-10 RX ORDER — LOSARTAN POTASSIUM 100 MG/1
100 TABLET ORAL EVERY MORNING
Qty: 90 TABLET | Refills: 3 | Status: SHIPPED | OUTPATIENT
Start: 2024-02-10

## 2024-02-10 RX ORDER — MONTELUKAST SODIUM 10 MG/1
10 TABLET ORAL DAILY
Qty: 90 TABLET | Refills: 3 | Status: SHIPPED | OUTPATIENT
Start: 2024-02-10

## 2024-02-10 RX ORDER — HYDRALAZINE HYDROCHLORIDE 50 MG/1
50 TABLET, FILM COATED ORAL 2 TIMES DAILY
Qty: 180 TABLET | Refills: 3 | Status: SHIPPED | OUTPATIENT
Start: 2024-02-10

## 2024-02-10 RX ORDER — FESOTERODINE FUMARATE 8 MG/1
8 TABLET, EXTENDED RELEASE ORAL DAILY
Qty: 90 TABLET | Refills: 5 | Status: SHIPPED | OUTPATIENT
Start: 2024-02-10

## 2024-02-10 RX ORDER — FLUTICASONE PROPIONATE 50 MCG
2 SPRAY, SUSPENSION (ML) NASAL 2 TIMES DAILY
Qty: 48 G | Refills: 3 | Status: SHIPPED | OUTPATIENT
Start: 2024-02-10

## 2024-02-10 RX ORDER — BISOPROLOL FUMARATE 10 MG/1
10 TABLET, FILM COATED ORAL
Qty: 90 TABLET | Refills: 3 | Status: SHIPPED | OUTPATIENT
Start: 2024-02-10

## 2024-03-01 ENCOUNTER — OFFICE VISIT (OUTPATIENT)
Dept: FAMILY MEDICINE CLINIC | Facility: CLINIC | Age: 84
End: 2024-03-01
Payer: MEDICARE

## 2024-03-01 VITALS
TEMPERATURE: 96.9 F | OXYGEN SATURATION: 97 % | HEIGHT: 67 IN | WEIGHT: 193 LBS | DIASTOLIC BLOOD PRESSURE: 70 MMHG | HEART RATE: 62 BPM | SYSTOLIC BLOOD PRESSURE: 138 MMHG | BODY MASS INDEX: 30.29 KG/M2

## 2024-03-01 DIAGNOSIS — J30.9 CHRONIC ALLERGIC RHINITIS: Chronic | ICD-10-CM

## 2024-03-01 DIAGNOSIS — I10 ESSENTIAL HYPERTENSION: Chronic | ICD-10-CM

## 2024-03-01 DIAGNOSIS — J01.40 ACUTE PANSINUSITIS, RECURRENCE NOT SPECIFIED: Primary | ICD-10-CM

## 2024-03-01 PROCEDURE — 3075F SYST BP GE 130 - 139MM HG: CPT | Performed by: NURSE PRACTITIONER

## 2024-03-01 PROCEDURE — 3078F DIAST BP <80 MM HG: CPT | Performed by: NURSE PRACTITIONER

## 2024-03-01 PROCEDURE — 99214 OFFICE O/P EST MOD 30 MIN: CPT | Performed by: NURSE PRACTITIONER

## 2024-03-01 RX ORDER — AMOXICILLIN 875 MG/1
875 TABLET, COATED ORAL EVERY 12 HOURS SCHEDULED
Qty: 20 TABLET | Refills: 0 | Status: SHIPPED | OUTPATIENT
Start: 2024-03-01

## 2024-03-01 NOTE — PROGRESS NOTES
History of Present Illness  Rylee Hull is a 83 y.o. female who presents to the clinic today complaining of upper respiratory symptoms and elevated blood pressure..   Hypertension  The current episode started more than 1 year ago. Risk factors for coronary artery disease include dyslipidemia, post-menopausal state and sedentary lifestyle.  She did do home blood pressure readings and this morning her monitor read 171/97 on her first try.  On the second try at approximately 1 hour ago it was 176/98 on her home monitor.    Upper Respiratory Symptoms  The problem has been gradually worsening. Associated symptoms include congestion, coughing (To clear) and sinus pain.  She has tried antihistamine and has Flonase but has not been using it for the symptoms. The treatment provided mild relief.  She has been prescribed montelukast for her chronic allergic rhinitis.    The following portions of the patient's history were reviewed and updated as appropriate: allergies, current medications, past family history, past medical history, past social history, past surgical history and problem list.    Review of Systems   Constitutional:  Positive for chills and fever. Negative for activity change, appetite change, fatigue and unexpected weight change.   HENT:  Positive for congestion, sinus pressure and sinus pain. Negative for ear pain and rhinorrhea.    Eyes:  Negative for visual disturbance.   Respiratory:  Positive for cough (To clear). Negative for shortness of breath and wheezing.    Cardiovascular:  Negative for chest pain, palpitations and leg swelling.   Gastrointestinal:  Negative for constipation, diarrhea, nausea and vomiting.   Endocrine: Negative for cold intolerance, heat intolerance, polydipsia, polyphagia and polyuria.   Musculoskeletal:  Positive for arthralgias. Negative for myalgias.   Skin:  Negative for color change and rash.   Allergic/Immunologic: Positive for environmental allergies.  "  Neurological:  Negative for dizziness, tremors, speech difficulty, weakness, light-headedness and headaches.   Hematological:  Negative for adenopathy.   Psychiatric/Behavioral:  Negative for confusion, decreased concentration and suicidal ideas. The patient is not nervous/anxious.    All other systems reviewed and are negative.      Vital signs:  /70 (BP Location: Left arm, Patient Position: Sitting, Cuff Size: Adult)   Pulse 62   Temp 96.9 °F (36.1 °C) (Temporal)   Ht 170.2 cm (67.01\")   Wt 87.5 kg (193 lb)   SpO2 97%   BMI 30.22 kg/m²     Physical Exam  Vitals and nursing note reviewed.   Constitutional:       Appearance: She is well-developed.   HENT:      Head: Atraumatic.      Right Ear: A middle ear effusion is present. Tympanic membrane is bulging. Tympanic membrane is not erythematous.      Left Ear: A middle ear effusion is present. Tympanic membrane is bulging. Tympanic membrane is not erythematous.      Nose: Nose normal.      Right Turbinates: Swollen.      Left Turbinates: Swollen.      Right Sinus: Maxillary sinus tenderness and frontal sinus tenderness present.      Left Sinus: Maxillary sinus tenderness and frontal sinus tenderness present.   Eyes:      General:         Right eye: No discharge.         Left eye: No discharge.      Conjunctiva/sclera: Conjunctivae normal.      Pupils: Pupils are equal, round, and reactive to light.   Neck:      Thyroid: No thyromegaly.      Vascular: No JVD.   Cardiovascular:      Rate and Rhythm: Normal rate and regular rhythm.      Heart sounds: Normal heart sounds. No murmur heard.     No friction rub.   Pulmonary:      Effort: Pulmonary effort is normal. No respiratory distress.      Breath sounds: Normal breath sounds. No wheezing or rales.   Abdominal:      General: Bowel sounds are normal. There is no distension.      Palpations: Abdomen is soft.      Tenderness: There is no abdominal tenderness. There is no guarding or rebound. "   Musculoskeletal:         General: No tenderness.      Cervical back: Normal range of motion and neck supple.   Lymphadenopathy:      Cervical: No cervical adenopathy.   Skin:     General: Skin is warm and dry.      Findings: No erythema or rash.   Neurological:      Mental Status: She is alert and oriented to person, place, and time.   Psychiatric:         Behavior: Behavior normal.         Thought Content: Thought content normal.         Judgment: Judgment normal.       BMI is >= 30 and <35. (Class 1 Obesity). The following options were offered after discussion;: weight loss educational material (shared in after visit summary) appropriate for her age    Assessment & Plan     Diagnoses and all orders for this visit:    1. Acute pansinusitis, recurrence not specified (Primary)  Comments:  Treatment options discussed.  Counseled regarding supportive care measures will prescribe amoxicillin 875 mg twice daily 10 days  Orders:  -     amoxicillin (AMOXIL) 875 MG tablet; Take 1 tablet by mouth Every 12 (Twelve) Hours.  Dispense: 20 tablet; Refill: 0    2. Chronic allergic rhinitis  Comments:  Continue montelukast and recommended restarting Flonase nasal spray.    3. Essential hypertension  Comments:  Blood pressure in the clinic today was adequately controlled.  If she would like to stop by the clinic to have her BP checked compared to home machine    Follow Up If symptoms worsen or do not improve  Findings and recommendations discussed with Rylee. Reviewed treatment options.  Counseled regarding supportive care measures.  Signs and symptoms of concern reviewed and if occur to seek further evaluation or if symptoms worsen or do not improve.    Rylee was given instructions and counseling regarding her condition or for health maintenance advice. Please see specific information pulled into the AVS if appropriate.    This document has been electronically signed by:

## 2024-05-09 ENCOUNTER — TELEPHONE (OUTPATIENT)
Dept: FAMILY MEDICINE CLINIC | Facility: CLINIC | Age: 84
End: 2024-05-09
Payer: MEDICARE

## 2024-06-07 ENCOUNTER — OFFICE VISIT (OUTPATIENT)
Dept: FAMILY MEDICINE CLINIC | Facility: CLINIC | Age: 84
End: 2024-06-07
Payer: MEDICARE

## 2024-06-07 DIAGNOSIS — J30.9 CHRONIC ALLERGIC RHINITIS: Primary | ICD-10-CM

## 2024-06-07 DIAGNOSIS — Z85.828 HISTORY OF NONMELANOMA SKIN CANCER: ICD-10-CM

## 2024-06-07 DIAGNOSIS — F32.9 REACTIVE DEPRESSION: ICD-10-CM

## 2024-06-07 DIAGNOSIS — E78.2 MIXED HYPERLIPIDEMIA: ICD-10-CM

## 2024-06-07 DIAGNOSIS — N39.41 URGE URINARY INCONTINENCE: ICD-10-CM

## 2024-06-07 DIAGNOSIS — I10 ESSENTIAL HYPERTENSION: ICD-10-CM

## 2024-06-07 DIAGNOSIS — Z00.00 HEALTHCARE MAINTENANCE: ICD-10-CM

## 2024-06-07 DIAGNOSIS — Z87.39 HISTORY OF ACUTE GOUTY ARTHRITIS: ICD-10-CM

## 2024-06-07 DIAGNOSIS — M85.80 OSTEOPENIA, UNSPECIFIED LOCATION: ICD-10-CM

## 2024-06-07 PROCEDURE — 3078F DIAST BP <80 MM HG: CPT | Performed by: GENERAL PRACTICE

## 2024-06-07 PROCEDURE — 1125F AMNT PAIN NOTED PAIN PRSNT: CPT | Performed by: GENERAL PRACTICE

## 2024-06-07 PROCEDURE — 99214 OFFICE O/P EST MOD 30 MIN: CPT | Performed by: GENERAL PRACTICE

## 2024-06-07 PROCEDURE — 3074F SYST BP LT 130 MM HG: CPT | Performed by: GENERAL PRACTICE

## 2024-06-07 PROCEDURE — G2211 COMPLEX E/M VISIT ADD ON: HCPCS | Performed by: GENERAL PRACTICE

## 2024-06-07 NOTE — PROGRESS NOTES
Subjective   Rylee Hull is a 84 y.o. female.     Chief Complaint  She returns for a scheduled reassessment of multiple medical problems including essential hypertension, hyperlipidemia, previous gouty arthritis, urge urinary incontinence, chronic allergic rhinitis, depression, and previous nonmelanoma skin cancer    History of Present Illness     Essential hypertension  She continues to deny any chest pain, palpitations, dyspnea, orthopnea, paroxysmal nocturnal dyspnea or peripheral edema.  She remains on bisoprolol, losartan, and hydralazine.     Hyperlipidemia  Her compliance with treatment remains good.  She is following a reasonable diet and exercises some daily. She is on no medication for this    Gouty Arthritis  She denies any attacks since last here and has not required any colchicine.  She has a family history of gout.      Urinary Incontinence  She has a long history of urinary frequency, nocturia x 1-2, urgency, and occasional incontinence if she does not get to the bathroom quick enough. She continues to deny any dysuria, hematuria, or incontinence with coughing, sneezing, lifting, pushing or pulling.  There is no history of any fecal incontinence.  She remains on fesoterodine with no apparent side effects     Chronic Allergic Rhinitis   She has a long history of intermittent rhinorrhea, nasal congestion, and postnasal drip. When severe these symptoms have been associated with a dry cough. There is no history of any other upper respiratory tract symptoms, and she continues to deny any chest pain, shortness of breath, hemoptysis, fever, or chills. She remains on nasal fluticasone, cetirizine, and montelukast and feels that all three help some     Depression  She has noted an improvement in her depression, nervousness, sleep, and daytime energy levels. There has been no change in her appetite and she continues to deny any suicidal ideation.  She is on no medication for this    Previous Nonmelanoma  Skin Cancer  She denies any change in the lesion over her right jawline, or any new lesions    The following portions of the patient's history were reviewed and updated as appropriate: allergies, current medications, past medical history, past social history, and problem list.    Review of Systems   Constitutional:  Positive for fatigue. Negative for chills and fever.   HENT:  Positive for rhinorrhea and sneezing. Negative for congestion, ear pain, sore throat and voice change.    Eyes:  Negative for visual disturbance.   Respiratory:  Negative for cough, shortness of breath and wheezing.    Cardiovascular:  Negative for chest pain, palpitations and leg swelling.   Gastrointestinal:  Negative for abdominal pain, blood in stool, constipation, diarrhea, nausea and vomiting.   Genitourinary:  Positive for frequency and urgency. Negative for dysuria and hematuria.   Musculoskeletal:  Negative for arthralgias, back pain, joint swelling and myalgias.   Skin:  Negative for rash.   Neurological:  Negative for dizziness, weakness and numbness.   Psychiatric/Behavioral:  Negative for sleep disturbance, suicidal ideas and depressed mood. The patient is nervous/anxious.      Objective   Physical Exam  Constitutional:       General: She is not in acute distress.     Appearance: Normal appearance. She is well-developed. She is not diaphoretic.      Comments: Bright and in good spirits. No apparent distress. No pallor, jaundice, diaphoresis, or cyanosis.   HENT:      Head: Atraumatic.      Right Ear: Tympanic membrane, ear canal and external ear normal.      Left Ear: Tympanic membrane, ear canal and external ear normal.      Mouth/Throat:      Lips: No lesions.      Mouth: Mucous membranes are moist. No oral lesions.      Pharynx: No oropharyngeal exudate or posterior oropharyngeal erythema.   Eyes:      General: Lids are normal.      Extraocular Movements: Extraocular movements intact.      Conjunctiva/sclera: Conjunctivae  normal.      Pupils: Pupils are equal.   Neck:      Thyroid: No thyroid mass or thyromegaly.      Vascular: No carotid bruit or JVD.      Trachea: Trachea normal. No tracheal deviation.   Cardiovascular:      Rate and Rhythm: Normal rate and regular rhythm.      Heart sounds: Normal heart sounds, S1 normal and S2 normal. No murmur heard.     No gallop.   Pulmonary:      Effort: Pulmonary effort is normal.      Breath sounds: Normal breath sounds.   Abdominal:      General: Bowel sounds are normal. There is no distension.   Musculoskeletal:      Right lower leg: No edema.      Left lower leg: No edema.      Comments: No peripheral joint redness or warmth.   Lymphadenopathy:      Head:      Right side of head: No submental, submandibular, tonsillar, preauricular, posterior auricular or occipital adenopathy.      Left side of head: No submental, submandibular, tonsillar, preauricular, posterior auricular or occipital adenopathy.      Cervical: No cervical adenopathy.      Upper Body:      Right upper body: No supraclavicular adenopathy.      Left upper body: No supraclavicular adenopathy.   Skin:     General: Skin is warm.      Coloration: Skin is not cyanotic, jaundiced or pale.      Findings: Lesion (12 mm slightly irregular well demarcated erythematous patch right medial jawline) present. No rash.      Nails: There is no clubbing.   Neurological:      Mental Status: She is alert and oriented to person, place, and time.      Cranial Nerves: No cranial nerve deficit, dysarthria or facial asymmetry.      Sensory: No sensory deficit.      Motor: No tremor.      Coordination: Coordination normal.      Gait: Gait normal.   Psychiatric:         Attention and Perception: Attention normal.         Mood and Affect: Mood normal.         Speech: Speech normal.         Behavior: Behavior normal.         Thought Content: Thought content normal. Thought content does not include suicidal ideation.       Assessment & Plan   Problems  Addressed this Visit          Allergies and Adverse Reactions    Chronic allergic rhinitis       Cardiac and Vasculature    Essential hypertension   Hypertension: at goal. Evidence of target organ damage: none.  Encouraged to continue to work on diet and exercise plan.   Continue current medication    Mixed hyperlipidemia  As above.   Continue current medication.  Fasting labs will be drawn at her return       Genitourinary and Reproductive     Urge urinary incontinence       Health Encounters    Healthcare maintenance  Recommended RSV vaccination with her flu shot this fall  Reminded of the potential benefits of an updated screening colonoscopy.  Patient will consider further  Will arrange an updated mammogram and DEXA scan at her return       Hematology and Neoplasia    History of nonmelanoma skin cancer  Encouraged report if any skin changes       Mental Health    Reactive depression  Doing well at present  Encouraged to report if this should change.       Musculoskeletal and Injuries    History of acute gouty arthritis  Remains in remission.  Encouraged to report if this should change.    Osteopenia  Encouraged to continue to pursue weight bearing activities while exercising joint protection.  As above.     Diagnoses         Codes Comments    Chronic allergic rhinitis    -  Primary ICD-10-CM: J30.9  ICD-9-CM: 477.9     Mixed hyperlipidemia     ICD-10-CM: E78.2  ICD-9-CM: 272.2     Essential hypertension     ICD-10-CM: I10  ICD-9-CM: 401.9     Urge urinary incontinence     ICD-10-CM: N39.41  ICD-9-CM: 788.31     Healthcare maintenance     ICD-10-CM: Z00.00  ICD-9-CM: V70.0     History of nonmelanoma skin cancer     ICD-10-CM: Z85.828  ICD-9-CM: V10.83     Reactive depression     ICD-10-CM: F32.9  ICD-9-CM: 300.4     Osteopenia, unspecified location     ICD-10-CM: M85.80  ICD-9-CM: 733.90     History of acute gouty arthritis     ICD-10-CM: Z87.39  ICD-9-CM: V13.4

## 2024-06-08 VITALS
RESPIRATION RATE: 14 BRPM | TEMPERATURE: 97.9 F | HEIGHT: 67 IN | WEIGHT: 189 LBS | DIASTOLIC BLOOD PRESSURE: 72 MMHG | HEART RATE: 73 BPM | OXYGEN SATURATION: 93 % | BODY MASS INDEX: 29.66 KG/M2 | SYSTOLIC BLOOD PRESSURE: 128 MMHG

## 2024-07-24 DIAGNOSIS — J30.9 CHRONIC ALLERGIC RHINITIS: ICD-10-CM

## 2024-07-24 RX ORDER — FLUTICASONE PROPIONATE 50 MCG
2 SPRAY, SUSPENSION (ML) NASAL 2 TIMES DAILY
Qty: 48 G | Refills: 3 | Status: SHIPPED | OUTPATIENT
Start: 2024-07-24

## 2024-07-24 NOTE — TELEPHONE ENCOUNTER
Caller: Rylee Hull    Relationship: Self    Best call back number: [phone]    Requested Prescriptions:   Requested Prescriptions     Pending Prescriptions Disp Refills    fluticasone (FLONASE) 50 MCG/ACT nasal spray 48 g 3     Si sprays into the nostril(s) as directed by provider 2 (Two) Times a Day. USE 2 SPRAYS IN EACH NOSTRIL ONCE DAILY        Pharmacy where request should be sent: [facility] [address] - [phone] [facility] [phone] FX     Last office visit with prescribing clinician: 2024   Last telemedicine visit with prescribing clinician: Visit date not found   Next office visit with prescribing clinician: 10/11/2024     Additional details provided by patient: OUT AND NEEDS NEW PRESCRIPTION SENT    Does the patient have less than a 3 day supply:  [x] Yes  [] No    Would you like a call back once the refill request has been completed: [x] Yes [] No    If the office needs to give you a call back, can they leave a voicemail: [x] Yes [] No    Lakeshia Zuleta MA   24 13:04 EDT

## 2024-07-25 ENCOUNTER — TELEMEDICINE (OUTPATIENT)
Dept: FAMILY MEDICINE CLINIC | Facility: CLINIC | Age: 84
End: 2024-07-25
Payer: MEDICARE

## 2024-07-25 VITALS — HEIGHT: 67 IN | BODY MASS INDEX: 29.66 KG/M2 | WEIGHT: 189 LBS

## 2024-07-25 DIAGNOSIS — U07.1 COVID-19 VIRUS INFECTION: Primary | ICD-10-CM

## 2024-07-25 RX ORDER — AZITHROMYCIN 250 MG/1
TABLET, FILM COATED ORAL
Qty: 6 TABLET | Refills: 0 | Status: SHIPPED | OUTPATIENT
Start: 2024-07-25

## 2024-07-25 NOTE — PROGRESS NOTES
"Video Telehealth Visit    This service was conducted via video including audio/visual technology through the use of [employer]    The patient has high risk comorbidities which necessitates this telehealth service.    The patient is located at their home.  I am located at the [facility] office.  Other participants in this video visit include the patient.    You have chosen to receive care through a telehealth visit.  Do you consent to use a video/audio connection for your medical care today? Yes        Subjective   Rylee Hull is a 84 y.o. female.       Chief Complaint -headache    History of Present Illness -       Headache-  Patient complains of sudden onset of severe headache, dry cough and \"coffee tastes bad.\"  Symptoms began yesterday.  She took a home COVID test that was positive today.    The following portions of the patient's history were reviewed and updated as appropriate: allergies, current medications, past family history, past medical history, past social history, past surgical history and problem list.        Objective  Vital signs:  Ht 170.2 cm (67.01\")   Wt 85.7 kg (189 lb)   BMI 29.59 kg/m²     Physical Exam  Vitals and nursing note reviewed.   Pulmonary:      Effort: Pulmonary effort is normal.      Breath sounds: Normal breath sounds. No wheezing.      Comments: No wheezing or heavy breathing noted on phone today  Neurological:      Mental Status: She is alert and oriented to person, place, and time.   Psychiatric:         Mood and Affect: Mood normal.         Thought Content: Thought content normal.                    Assessment & Plan     Diagnoses and all orders for this visit:    1. COVID-19 virus infection (Primary)  Comments:  Symptomatic care advised including Robitussin and Tylenol.  Advised quarantine per current CDC guidelines.  Orders:  -     Nirmatrelvir & Ritonavir, 300mg/100mg, (PAXLOVID); Take 3 tablets by mouth 2 (Two) Times a Day.  Dispense: " 30 tablet; Refill: 0  -     azithromycin (Zithromax Z-Ez) 250 MG tablet; Take 2 tablets by mouth on day 1, then 1 tablet daily on days 2-5  Dispense: 6 tablet; Refill: 0                   Patient was given instructions and counseling regarding his condition or for health maintenance advice. Please see specific information pulled into the AVS if appropriate      This document has been electronically signed by:  Ro Delgado PA-C

## 2024-07-25 NOTE — PATIENT INSTRUCTIONS
How to Quarantine at Home  Information for Patients and Families    These instructions are for people with confirmed or suspected COVID-19 who do not need to be hospitalized and those with confirmed COVID-19 who were hospitalized and discharged to care for themselves at home.    If you were tested through the Health Department  The Health Department will monitor your wellbeing.  If it is determined that you do not need to be hospitalized and can be isolated at home, you will be monitored by staff from your local or state health department.     If you were tested through a Commercial Lab  You will need to monitor yourself and report changes in your symptoms to your doctor.  See the section below called Monitor Your Symptoms.    Follow these steps until a healthcare provider or local or state health department says you can return to your normal activities.    Stay home except to get medical care  Restrict activities outside your home, except for getting medical care.   Do not go to work, school, or public areas.   Avoid using public transportation, ride-sharing, or taxis.    Separate yourself from other people and animals in your home  People  As much as possible, you should stay in a specific room and away from other people in your home. Also, you should use a separate bathroom, if available.    Animals  You should restrict contact with pets and other animals while you are sick with COVID-19, just like you would around other people. When possible, have another member of your household care for your animals while you are sick. If you are sick with COVID-19, avoid contact with your pet, including petting, snuggling, being kissed or licked, and sharing food. If you must care for your pet or be around animals while you are sick, wash your hands before and after you interact with pets and wear a facemask. See COVID-19 and Animals for more information.    Call ahead before visiting your doctor  If you have a medical  appointment, call the healthcare provider and tell them that you have or may have COVID-19. This information will help the healthcare provider’s office take steps to keep other people from getting infected or exposed.    Wear a facemask  You should wear a facemask when you are around other people (e.g., sharing a room or vehicle) or pets and before you enter a healthcare provider’s office.     If you are not able to wear a facemask (for example, because it causes trouble breathing), then people who live with you should not stay in the same room with you, or they should wear a facemask if they enter your room.    Cover your coughs and sneezes  Cover your mouth and nose with a tissue when you cough or sneeze.   Throw used tissues in a lined trash can.   Immediately wash your hands with soap and water for at least 20 seconds or, if soap and water are not available, clean your hands with an alcohol-based hand  that contains at least 60% alcohol.    Clean your hands often  Wash your hands often with soap and water for at least 20 seconds, especially after blowing your nose, coughing, or sneezing; going to the bathroom; and before eating or preparing food.     If soap and water are not readily available, use an alcohol-based hand  with at least 60% alcohol, covering all surfaces of your hands and rubbing them together until they feel dry.    Soap and water are the best option if hands are visibly dirty. Avoid touching your eyes, nose, and mouth with unwashed hands.    Avoid sharing personal household items  You should not share dishes, drinking glasses, cups, eating utensils, towels, or bedding with other people or pets in your home.   After using these items, they should be washed thoroughly with soap and water.    Clean all “high-touch” surfaces everyday  High touch surfaces include counters, tabletops, doorknobs, bathroom fixtures, toilets, phones, keyboards, tablets, and bedside tables.   Also, clean  any surfaces that may have blood, stool, or body fluids on them.   Use a household cleaning spray or wipe, according to the label instructions. Labels contain instructions for safe and effective use of the cleaning product, including precautions you should take when applying the product, such as wearing gloves and making sure you have good ventilation during use of the product.    Monitor your symptoms  Seek prompt medical attention if your illness is worsening (e.g., difficulty breathing).   Before seeking care, call your healthcare provider and tell them that you have, or are being evaluated for, COVID-19.   Put on a facemask before you enter the facility.     These steps will help the healthcare provider’s office to keep other people in the office or waiting room from getting infected or exposed.   Persons who are placed under active monitoring or facilitated self-monitoring should follow instructions provided by their local health department or occupational health professionals, as appropriate.  If you have a medical emergency and need to call 911, notify the dispatch personnel that you have, or are being evaluated for COVID-19. If possible, put on a facemask before emergency medical services arrive.    Discontinuing home isolation  Patients with confirmed COVID-19 should remain under home isolation precautions until the risk of secondary transmission to others is thought to be low. The decision to discontinue home isolation precautions should be made on a case-by-case basis, in consultation with healthcare providers and state and local health departments.    The below content are for household members, intimate partners, and caregivers of a patient with symptomatic laboratory-confirmed COVID-19 or a patient under investigation:    Household members, intimate partners, and caregivers may have close contact with a person with symptomatic, laboratory-confirmed COVID-19 or a person under investigation.     Close  contacts should monitor their health; they should call their healthcare provider right away if they develop symptoms suggestive of COVID-19 (e.g., fever, cough, shortness of breath)     Close contacts should also follow these recommendations:  Make sure that you understand and can help the patient follow their healthcare provider’s instructions for medication(s) and care. You should help the patient with basic needs in the home and provide support for getting groceries, prescriptions, and other personal needs.  Monitor the patient’s symptoms. If the patient is getting sicker, call his or her healthcare provider and tell them that the patient has laboratory-confirmed COVID-19. This will help the healthcare provider’s office take steps to keep other people in the office or waiting room from getting infected. Ask the healthcare provider to call the local or [facility] health department for additional guidance. If the patient has a medical emergency and you need to call 911, notify the dispatch personnel that the patient has, or is being evaluated for COVID-19.  Household members should stay in another room or be  from the patient as much as possible. Household members should use a separate bedroom and bathroom, if available.  Prohibit visitors who do not have an essential need to be in the home.  Household members should care for any pets in the home. Do not handle pets or other animals while sick.  For more information, see COVID-19 and Animals.  Make sure that shared spaces in the home have good air flow, such as by an air conditioner or an opened window, weather permitting.  Perform hand hygiene frequently. Wash your hands often with soap and water for at least 20 seconds or use an alcohol-based hand  that contains 60 to 95% alcohol, covering all surfaces of your hands and rubbing them together until they feel dry. Soap and water should be used preferentially if hands are visibly dirty.  Avoid touching  your eyes, nose, and mouth with unwashed hands.  The patient should wear a facemask when you are around other people. If the patient is not able to wear a facemask (for example, because it causes trouble breathing), you, as the caregiver, should wear a mask when you are in the same room as the patient.  Wear a disposable facemask and gloves when you touch or have contact with the patient’s blood, stool, or body fluids, such as saliva, sputum, nasal mucus, vomit, or urine.   Throw out disposable facemasks and gloves after using them. Do not reuse.  When removing personal protective equipment, first remove and dispose of gloves. Then, immediately clean your hands with soap and water or alcohol-based hand . Next, remove and dispose of facemask, and immediately clean your hands again with soap and water or alcohol-based hand .  Avoid sharing household items with the patient. You should not share dishes, drinking glasses, cups, eating utensils, towels, bedding, or other items. After the patient uses these items, you should wash them thoroughly (see below “Wash laundry thoroughly”).  Clean all “high-touch” surfaces, such as counters, tabletops, doorknobs, bathroom fixtures, toilets, phones, keyboards, tablets, and bedside tables, every day. Also, clean any surfaces that may have blood, stool, or body fluids on them.   Use a household cleaning spray or wipe, according to the label instructions. Labels contain instructions for safe and effective use of the cleaning product including precautions you should take when applying the product, such as wearing gloves and making sure you have good ventilation during use of the product.  Wash laundry thoroughly.   Immediately remove and wash clothes or bedding that have blood, stool, or body fluids on them.  Wear disposable gloves while handling soiled items and keep soiled items away from your body. Clean your hands (with soap and water or an alcohol-based hand  ) immediately after removing your gloves.  Read and follow directions on labels of laundry or clothing items and detergent. In general, using a normal laundry detergent according to washing machine instructions and dry thoroughly using the warmest temperatures recommended on the clothing label.  Place all used disposable gloves, facemasks, and other contaminated items in a lined container before disposing of them with other household waste. Clean your hands (with soap and water or an alcohol-based hand ) immediately after handling these items. Soap and water should be used preferentially if hands are visibly dirty.  Discuss any additional questions with your state or local health department or healthcare provider.    Adapted from information provided by the Centers for Disease Control and Prevention.  For more information, visit https://www.cdc.gov/coronavirus/2019-ncov/hcp/guidance-prevent-spread.html

## 2024-09-06 DIAGNOSIS — Z87.39 HISTORY OF ACUTE GOUTY ARTHRITIS: ICD-10-CM

## 2024-09-06 DIAGNOSIS — I10 ESSENTIAL HYPERTENSION: ICD-10-CM

## 2024-09-06 RX ORDER — LOSARTAN POTASSIUM 100 MG/1
100 TABLET ORAL EVERY MORNING
Qty: 90 TABLET | Refills: 3 | Status: SHIPPED | OUTPATIENT
Start: 2024-09-06

## 2024-09-06 RX ORDER — BISOPROLOL FUMARATE 10 MG/1
10 TABLET, FILM COATED ORAL
Qty: 90 TABLET | Refills: 3 | Status: SHIPPED | OUTPATIENT
Start: 2024-09-06

## 2024-09-27 DIAGNOSIS — I10 ESSENTIAL HYPERTENSION: ICD-10-CM

## 2024-09-27 RX ORDER — HYDRALAZINE HYDROCHLORIDE 50 MG/1
50 TABLET, FILM COATED ORAL 2 TIMES DAILY
Qty: 180 TABLET | Refills: 3 | Status: SHIPPED | OUTPATIENT
Start: 2024-09-27

## 2024-10-01 ENCOUNTER — OFFICE VISIT (OUTPATIENT)
Dept: FAMILY MEDICINE CLINIC | Facility: CLINIC | Age: 84
End: 2024-10-01
Payer: MEDICARE

## 2024-10-01 VITALS
OXYGEN SATURATION: 95 % | HEART RATE: 78 BPM | BODY MASS INDEX: 29.66 KG/M2 | WEIGHT: 189 LBS | TEMPERATURE: 98.9 F | RESPIRATION RATE: 16 BRPM | HEIGHT: 67 IN | DIASTOLIC BLOOD PRESSURE: 100 MMHG | SYSTOLIC BLOOD PRESSURE: 200 MMHG

## 2024-10-01 DIAGNOSIS — I16.0 HYPERTENSIVE URGENCY: Primary | ICD-10-CM

## 2024-10-01 PROCEDURE — 3077F SYST BP >= 140 MM HG: CPT | Performed by: PHYSICIAN ASSISTANT

## 2024-10-01 PROCEDURE — 1159F MED LIST DOCD IN RCRD: CPT | Performed by: PHYSICIAN ASSISTANT

## 2024-10-01 PROCEDURE — 3080F DIAST BP >= 90 MM HG: CPT | Performed by: PHYSICIAN ASSISTANT

## 2024-10-01 PROCEDURE — 1125F AMNT PAIN NOTED PAIN PRSNT: CPT | Performed by: PHYSICIAN ASSISTANT

## 2024-10-01 PROCEDURE — 1160F RVW MEDS BY RX/DR IN RCRD: CPT | Performed by: PHYSICIAN ASSISTANT

## 2024-10-01 PROCEDURE — 99213 OFFICE O/P EST LOW 20 MIN: CPT | Performed by: PHYSICIAN ASSISTANT

## 2024-10-01 NOTE — PROGRESS NOTES
"    Subjective        Chief Complaint  Hypertension and Dizziness    Subjective      Rylee Hull is a 84 y.o. female who presents today to [facility] for Hypertension and Dizziness. She has a past medical history of HTN, HLD, and gout.    Hypertension and Dizziness:  She reports awakening this morning with dizziness and overall feeling poorly. She has had facial flushing and headache. She has felt like her BP is up, but reports her monitor at home is not working appropriately. BP generally runs normal on her current regimen of losartan, hydralazine, and bisoprolol. No recent changes in medications or missed doses. Denies any vision changes, chest pains, or leg swelling. No fever or chills. Has chronic seasonal allergies for which she taking Singulair and flonase. Denies any ear pain, but has had a feeling of ear fullness bilaterally.       Current Outpatient Medications:     bisoprolol (ZEBeta) 10 MG tablet, TAKE ONE TABLET BY MOUTH AT BEDTIME, Disp: 90 tablet, Rfl: 3    fesoterodine fumarate (Toviaz) 8 MG tablet sustained-release 24 hour tablet, Take 1 tablet by mouth Daily., Disp: 90 tablet, Rfl: 5    fluticasone (FLONASE) 50 MCG/ACT nasal spray, 2 sprays into the nostril(s) as directed by provider 2 (Two) Times a Day. USE 2 SPRAYS IN EACH NOSTRIL ONCE DAILY, Disp: 48 g, Rfl: 3    hydrALAZINE (APRESOLINE) 50 MG tablet, TAKE ONE TABLET BY MOUTH TWICE DAILY, Disp: 180 tablet, Rfl: 3    losartan (COZAAR) 100 MG tablet, TAKE ONE TABLET BY MOUTH EVERY MORNING, Disp: 90 tablet, Rfl: 3    montelukast (SINGULAIR) 10 MG tablet, Take 1 tablet by mouth Daily., Disp: 90 tablet, Rfl: 3    vitamin D3 125 MCG (5000 UT) capsule capsule, Take 1 capsule by mouth Daily., Disp: , Rfl:       Allergies   Allergen Reactions    Barbiturates Rash       Objective     Objective   Vital Signs:  BP (!) 200/100   Pulse 78   Temp 98.9 °F (37.2 °C) (Temporal)   Resp 16   Ht 170.2 cm (67\")   Wt 85.7 " PATIENT PRESENTS TO ED WITH LOWER ABDOMINAL PAIN WITH N/V X YESTERDAY .  . 
DENIES CONSTIPATION/ DIARRHEA; SKIN IS PINK/WARM/DRY; AAOX4 WITH EVEN AND 
STEADY GAIT; LUNGS CLEAR BL; HR EVEN AND REGULAR; PT DENIES ANY FEVER, CP, SOB, 
OR COUGH AT THIS TIME; PATIENT STATES PAIN OF 8/10 AT THIS TIME; VSS; PATIENT 
POSITIONED FOR COMFORT; HOB ELEVATED; BEDRAILS UP X2; BED DOWN. ER MD MADE 
AWARE OF PT STATUS. "kg (189 lb)   SpO2 95%   BMI 29.60 kg/m²   Estimated body mass index is 29.6 kg/m² as calculated from the following:    Height as of this encounter: 170.2 cm (67\").    Weight as of this encounter: 85.7 kg (189 lb).    Past Medical History:   Diagnosis Date    Actinic keratosis     Allergic rhinitis     Arthritis     Hyperlipidemia     Hypertension     Osteopenia     Vitamin deficiency      Past Surgical History:   Procedure Laterality Date    ABDOMINAL SURGERY      APPENDECTOMY      BREAST BIOPSY Left     over 25 years    COLONOSCOPY      several    HYSTERECTOMY      SKIN LESION EXCISION Left 7/14/2023    Procedure: SKIN LESION EXCISION;  Surgeon: Manolo Bergman MD;  Location: [facility];  Service: General;  Laterality: Left;     Social History     Socioeconomic History    Marital status:     Number of children: 3    Years of education: 12   Tobacco Use    Smoking status: Never     Passive exposure: Never    Smokeless tobacco: Never   Vaping Use    Vaping status: Never Used   Substance and Sexual Activity    Alcohol use: No    Drug use: Never    Sexual activity: Defer     Birth control/protection: Hysterectomy      Physical Exam  Vitals and nursing note reviewed.   Constitutional:       Appearance: She is well-developed. She is not diaphoretic.      Comments: Patient appears ill, but non-toxic. Dyspnea with mild exertion. Facial flushing noted.    HENT:      Head: Normocephalic and atraumatic.      Comments: Mild erythema of the right ear canal and mild nonobstructive cerumen noted in the left. Otherwise unremarkable.      Right Ear: Tympanic membrane normal.      Left Ear: Tympanic membrane normal.      Mouth/Throat:      Pharynx: No oropharyngeal exudate or posterior oropharyngeal erythema.   Eyes:      General: No scleral icterus.        Right eye: No discharge.         Left eye: No discharge.      Conjunctiva/sclera: Conjunctivae normal.   Cardiovascular:      Rate and Rhythm: Normal rate and " regular rhythm.      Heart sounds: Normal heart sounds. No murmur heard.     No friction rub. No gallop.   Pulmonary:      Effort: Pulmonary effort is normal. No respiratory distress.      Breath sounds: Normal breath sounds. No wheezing or rales.   Chest:      Chest wall: No tenderness.   Musculoskeletal:         General: Normal range of motion.      Cervical back: Normal range of motion and neck supple.   Skin:     General: Skin is warm and dry.      Coloration: Skin is not pale.      Findings: No erythema or rash.   Neurological:      General: No focal deficit present.      Mental Status: She is alert and oriented to person, place, and time.      Comments: +Generalized weakness, unsteady gait which is reported to be stable.    Psychiatric:         Behavior: Behavior normal.        Result Review :  The following data was reviewed by: BRANDAN Lerner on 10/01/2024:  TSH   Date Value Ref Range Status   10/09/2023 1.080 0.270 - 4.200 uIU/mL Final     HDL Cholesterol   Date Value Ref Range Status   10/09/2023 71 (H) 40 - 60 mg/dL Final     LDL Cholesterol    Date Value Ref Range Status   10/09/2023 114 (H) 0 - 100 mg/dL Final     Triglycerides   Date Value Ref Range Status   10/09/2023 97 0 - 150 mg/dL Final     Total Cholesterol   Date Value Ref Range Status   09/01/2015 204 (H) 0 - 200 mg/dL Final     Comment:     DF by IF @ 09/01/2015 12:31  Cholesterol Reference Range:      Desirable                 < 200mg/dl      Borderline High        200-239mg/dl      High Risk                 > 239mg/dl           Assessment / Plan         Assessment   Diagnoses and all orders for this visit:    1. Hypertensive urgency (Primary)  Initial /100. Patient at in the room for 20-30 mins with repeat being 200/100 on the right, 198/96 on the left.   Patient symptomatic with headache, dizziness, near syncope, and mild dyspnea. Given new onset hypertensive urgency without obvious cause, it was recommended that she proceed to  the ER for further work up and gradual lowering of her BP. I offered to contact EMS and family member reports they will take her to [facility] ER directly.   Will follow up pending ER work up. At this time, she has a follow up scheduled with Dr. Boss next week. Will keep this appt in place.          No orders of the defined types were placed in this encounter.    Follow Up   Return if symptoms worsen or fail to improve, for Follow up with PCP as scheduled.    Patient was given instructions and counseling regarding her condition or for health maintenance advice. Please see specific information pulled into the AVS if appropriate.       This document has been electronically signed by BRANDAN Lerner   October 1, 2024 13:54 EDT    Dictated Utilizing Dragon Dictation: Part of this note may be an electronic transcription/translation of spoken language to printed text using the Dragon Dictation System.

## 2024-10-02 ENCOUNTER — TELEPHONE (OUTPATIENT)
Dept: FAMILY MEDICINE CLINIC | Facility: CLINIC | Age: 84
End: 2024-10-02

## 2024-10-02 NOTE — TELEPHONE ENCOUNTER
Caller: Sun Hull    Relationship: Emergency Contact    Best call back number: [phone]     Timeframe paperwork needed: SUN IS CALLING TO LET YOU KNOW JENNIFER WENT TO [facility] ER [id number].  [facility] SAID YOU CAN RETRIEVE HER RECORDS FOR THE FILE.  SHE DOESN'T WANT TO SCHEDULE A HOSPITAL FU FOR NOW.  SHE HAS AN OFFICE VISIR [id number]

## 2024-10-11 ENCOUNTER — OFFICE VISIT (OUTPATIENT)
Dept: FAMILY MEDICINE CLINIC | Facility: CLINIC | Age: 84
End: 2024-10-11
Payer: MEDICARE

## 2024-10-11 VITALS
SYSTOLIC BLOOD PRESSURE: 132 MMHG | HEART RATE: 72 BPM | WEIGHT: 191 LBS | RESPIRATION RATE: 14 BRPM | BODY MASS INDEX: 29.98 KG/M2 | OXYGEN SATURATION: 98 % | TEMPERATURE: 98.6 F | HEIGHT: 67 IN | DIASTOLIC BLOOD PRESSURE: 68 MMHG

## 2024-10-11 DIAGNOSIS — R42 POSTURAL DIZZINESS WITH PRESYNCOPE: ICD-10-CM

## 2024-10-11 DIAGNOSIS — F32.9 REACTIVE DEPRESSION: ICD-10-CM

## 2024-10-11 DIAGNOSIS — Z87.39 HISTORY OF ACUTE GOUTY ARTHRITIS: ICD-10-CM

## 2024-10-11 DIAGNOSIS — M81.0 AGE-RELATED OSTEOPOROSIS WITHOUT CURRENT PATHOLOGICAL FRACTURE: ICD-10-CM

## 2024-10-11 DIAGNOSIS — M85.80 OSTEOPENIA, UNSPECIFIED LOCATION: ICD-10-CM

## 2024-10-11 DIAGNOSIS — Z00.00 HEALTHCARE MAINTENANCE: ICD-10-CM

## 2024-10-11 DIAGNOSIS — Z85.828 HISTORY OF NONMELANOMA SKIN CANCER: ICD-10-CM

## 2024-10-11 DIAGNOSIS — I10 ESSENTIAL HYPERTENSION: ICD-10-CM

## 2024-10-11 DIAGNOSIS — Z12.31 ENCOUNTER FOR SCREENING MAMMOGRAM FOR BREAST CANCER: ICD-10-CM

## 2024-10-11 DIAGNOSIS — E78.2 MIXED HYPERLIPIDEMIA: ICD-10-CM

## 2024-10-11 DIAGNOSIS — Z23 ENCOUNTER FOR IMMUNIZATION: ICD-10-CM

## 2024-10-11 DIAGNOSIS — R55 POSTURAL DIZZINESS WITH PRESYNCOPE: ICD-10-CM

## 2024-10-11 DIAGNOSIS — J30.9 CHRONIC ALLERGIC RHINITIS: Primary | ICD-10-CM

## 2024-10-11 DIAGNOSIS — N39.41 URGE URINARY INCONTINENCE: ICD-10-CM

## 2024-10-11 RX ORDER — FLUTICASONE PROPIONATE 50 UG/1
2 SPRAY, METERED NASAL 2 TIMES DAILY
Qty: 48 G | Refills: 3 | Status: SHIPPED | OUTPATIENT
Start: 2024-10-11

## 2024-10-11 RX ORDER — MONTELUKAST SODIUM 10 MG/1
10 TABLET ORAL DAILY
Qty: 90 TABLET | Refills: 3 | Status: SHIPPED | OUTPATIENT
Start: 2024-10-11

## 2024-10-11 RX ORDER — LOSARTAN POTASSIUM 100 MG/1
100 TABLET ORAL EVERY MORNING
Qty: 90 TABLET | Refills: 3 | Status: SHIPPED | OUTPATIENT
Start: 2024-10-11

## 2024-10-11 RX ORDER — FESOTERODINE FUMARATE 8 MG/1
8 TABLET, FILM COATED, EXTENDED RELEASE ORAL DAILY
Qty: 90 TABLET | Refills: 3 | Status: SHIPPED | OUTPATIENT
Start: 2024-10-11

## 2024-10-11 RX ORDER — HYDRALAZINE HYDROCHLORIDE 50 MG/1
50 TABLET, FILM COATED ORAL 2 TIMES DAILY
Qty: 180 TABLET | Refills: 3 | Status: SHIPPED | OUTPATIENT
Start: 2024-10-11

## 2024-10-11 RX ORDER — BISOPROLOL FUMARATE 10 MG/1
10 TABLET, FILM COATED ORAL
Qty: 90 TABLET | Refills: 3 | Status: SHIPPED | OUTPATIENT
Start: 2024-10-11

## 2024-10-11 NOTE — PATIENT INSTRUCTIONS
Fall Prevention in the Home, Adult  Falls can cause injuries and can happen to people of all ages. There are many things you can do to make your home safer and to help prevent falls.  What actions can I take to prevent falls?  General information  Use good lighting in all rooms. Make sure to:  Replace any light bulbs that burn out.  Turn on the lights in dark areas and use night-lights.  Keep items that you use often in easy-to-reach places. Lower the shelves around your home if needed.  Move furniture so that there are clear paths around it.  Do not use throw rugs or other things on the floor that can make you trip.  If any of your floors are uneven, fix them.  Add color or contrast paint or tape to clearly jimmy and help you see:  Grab bars or handrails.  First and last steps of staircases.  Where the edge of each step is.  If you use a ladder or stepladder:  Make sure that it is fully opened. Do not climb a closed ladder.  Make sure the sides of the ladder are locked in place.  Have someone hold the ladder while you use it.  Know where your pets are as you move through your home.  What can I do in the bathroom?         Keep the floor dry. Clean up any water on the floor right away.  Remove soap buildup in the bathtub or shower. Buildup makes bathtubs and showers slippery.  Use non-skid mats or decals on the floor of the bathtub or shower.  Attach bath mats securely with double-sided, non-slip rug tape.  If you need to sit down in the shower, use a non-slip stool.  Install grab bars by the toilet and in the bathtub and shower. Do not use towel bars as grab bars.  What can I do in the bedroom?  Make sure that you have a light by your bed that is easy to reach.  Do not use any sheets or blankets on your bed that hang to the floor.  Have a firm chair or bench with side arms that you can use for support when you get dressed.  What can I do in the kitchen?  Clean up any spills right away.  If you need to reach something  above you, use a step stool with a grab bar.  Keep electrical cords out of the way.  Do not use floor polish or wax that makes floors slippery.  What can I do with my stairs?  Do not leave anything on the stairs.  Make sure that you have a light switch at the top and the bottom of the stairs.  Make sure that there are handrails on both sides of the stairs. Fix handrails that are broken or loose.  Install non-slip stair treads on all your stairs if they do not have carpet.  Avoid having throw rugs at the top or bottom of the stairs.  Choose a carpet that does not hide the edge of the steps on the stairs. Make sure that the carpet is firmly attached to the stairs. Fix carpet that is loose or worn.  What can I do on the outside of my home?  Use bright outdoor lighting.  Fix the edges of walkways and driveways and fix any cracks. Clear paths of anything that can make you trip, such as tools or rocks.  Add color or contrast paint or tape to clearly jimmy and help you see anything that might make you trip as you walk through a door, such as a raised step or threshold.  Trim any bushes or trees on paths to your home.  Check to see if handrails are loose or broken and that both sides of all steps have handrails. Install guardrails along the edges of any raised decks and porches.  Have leaves, snow, or ice cleared regularly. Use sand, salt, or ice melter on paths if you live where there is ice and snow during the winter.  Clean up any spills in your garage right away. This includes grease or oil spills.  What other actions can I take?  Review your medicines with your doctor. Some medicines can cause dizziness or changes in blood pressure, which increase your risk of falling.  Wear shoes that:  Have a low heel. Do not wear high heels.  Have rubber bottoms and are closed at the toe.  Feel good on your feet and fit well.  Use tools that help you move around if needed. These include:  Canes.  Walkers.  Scooters.  Crutches.  Ask  your doctor what else you can do to help prevent falls. This may include seeing a physical therapist to learn to do exercises to move better and get stronger.  Where to find more information  Centers for Disease Control and PreventionTHAD: cdc.gov  [employer] [place] on Aging: angi.nih.gov  [employer] [place] on Aging: angi.nih.gov  Contact a doctor if:  You are afraid of falling at home.  You feel weak, drowsy, or dizzy at home.  You fall at home.  Get help right away if you:  Lose consciousness or have trouble moving after a fall.  Have a fall that causes a head injury.  These symptoms may be an emergency. Get help right away. Call 911.  Do not wait to see if the symptoms will go away.  Do not drive yourself to the hospital.  This information is not intended to replace advice given to you by your health care provider. Make sure you discuss any questions you have with your health care provider.  Document Revised: 08/21/2023 Document Reviewed: 08/21/2023  [employer] Patient Education © 2024 [employer]  Mammogram  A mammogram is an X-ray of the breasts. This procedure can screen for and detect any changes that may indicate breast cancer. Mammograms are regularly done beginning at age 40 for women with average risk. A man may have a mammogram if he has a lump or swelling in his breast tissue.  A mammogram can also identify other changes and variations in the breast, such as:  Inflammation of the breast tissue (mastitis).  An infected area that contains a collection of pus (abscess).  A fluid-filled sac (cyst).  Tumors that are not cancerous (benign).  Fibrocystic changes. This is when breast tissue becomes denser and can make the tissue feel rope-like or uneven under the skin.  Women at higher risk for breast cancer need earlier and more comprehensive screening for abnormal changes. Breast tomosynthesis, or three-dimensional (3D) mammography, and digital breast tomosynthesis are advanced forms of imaging that  create 3D pictures of the breasts.  Tell a health care provider:  About any allergies you have.  If you have breast implants.  If you have had previous breast disease, biopsy, or surgery.  If you have a family history of breast cancer.  If you are breastfeeding.  Whether you are pregnant or may be pregnant.  What are the risks?  Generally, this is a safe procedure. However, problems may occur, including:  Exposure to radiation. Radiation levels are very low with this test.  The need for more tests.  The mammogram fails to detect certain cancers or the results are misinterpreted.  Difficulty with detecting breast cancer in women with dense breasts.  What happens before the procedure?  Schedule your test about 1-2 weeks after your menstrual period if you are menstruating. This is usually when your breasts are the least tender.  If you have had a mammogram done at a different facility in the past, get the mammogram X-rays or have them sent to your current exam facility. The new and old images will be compared.  Wash your breasts and underarms on the day of the test.  Do not wear deodorants, perfumes, lotions, or powders anywhere on your body on the day of the test.  Remove any jewelry from your neck.  Wear clothes that you can change into and out of easily.  What happens during the procedure?    You will undress from the waist up and put on a gown that opens in the front.  You will  front of the X-ray machine.  Each breast will be placed between two plastic or glass plates. The plates will compress your breast for a few seconds. Try to stay as relaxed as possible. This procedure does not cause any harm to your breasts. Any discomfort you feel will be very brief.  X-rays will be taken from different angles of each breast.  The procedure may vary among health care providers and hospitals.  What can I expect after the procedure?  The mammogram will be examined by a specialist (radiologist).  You may need to repeat  certain parts of the test, depending on the quality of the images. This is done if the radiologist needs a better view of the breast tissue.  You may resume your normal activities.  It is up to you to get the results of your procedure. Ask your health care provider, or the department that is doing the procedure, when your results will be ready.  Summary  A mammogram is an X-ray of the breasts. This procedure can screen for and detect any changes that may indicate breast cancer.  A man may have a mammogram if he has a lump or swelling in his breast tissue.  If you have had a mammogram done at a different facility in the past, get the mammogram X-rays or have them sent to your current exam facility in order to compare them.  Schedule your test about 1-2 weeks after your menstrual period if you are menstruating.  Ask when your test results will be ready. Make sure you get your test results.  This information is not intended to replace advice given to you by your health care provider. Make sure you discuss any questions you have with your health care provider.  Document Revised: 08/31/2022 Document Reviewed: 10/18/2021  [employer] Patient Education © 2024 [employer]

## 2024-10-11 NOTE — ASSESSMENT & PLAN NOTE
Advised to get up carefully from sitting and especially lying  Encouraged report if any further episodes

## 2024-10-11 NOTE — PROGRESS NOTES
Subjective   The ABCs of the Annual Wellness Visit  Medicare Wellness Visit    Rylee Hull is a 84 y.o. patient who presents for a Medicare Wellness Visit.    The following portions of the patient's history were reviewed and   updated as appropriate: allergies, current medications, past family history, past medical history, past social history, past surgical history, and problem list.    Compared to one year ago, the patient's physical   health is the same.  Compared to one year ago, the patient's mental   health is the same.    Recent Hospitalizations:  She was not admitted to the hospital during the last year.     Current Medical Providers:  Patient Care Team:  Jorge Boss MD as PCP - General (Family Medicine)    Outpatient Medications Prior to Visit   Medication Sig Dispense Refill    vitamin D3 125 MCG (5000 UT) capsule capsule Take 1 capsule by mouth Daily.      bisoprolol (ZEBeta) 10 MG tablet TAKE ONE TABLET BY MOUTH AT BEDTIME 90 tablet 3    fesoterodine fumarate (Toviaz) 8 MG tablet sustained-release 24 hour tablet Take 1 tablet by mouth Daily. 90 tablet 5    fluticasone (FLONASE) 50 MCG/ACT nasal spray 2 sprays into the nostril(s) as directed by provider 2 (Two) Times a Day. USE 2 SPRAYS IN EACH NOSTRIL ONCE DAILY 48 g 3    hydrALAZINE (APRESOLINE) 50 MG tablet TAKE ONE TABLET BY MOUTH TWICE DAILY 180 tablet 3    losartan (COZAAR) 100 MG tablet TAKE ONE TABLET BY MOUTH EVERY MORNING 90 tablet 3    montelukast (SINGULAIR) 10 MG tablet Take 1 tablet by mouth Daily. 90 tablet 3     No facility-administered medications prior to visit.     No opioid medication identified on active medication list. I have reviewed chart for other potential  high risk medication/s and harmful drug interactions in the elderly.      Aspirin is not on active medication list.  Aspirin use is not indicated based on review of current medical condition/s. Risk of harm outweighs potential benefits.  .    Patient  "Active Problem List   Diagnosis    Osteopenia    Mixed hyperlipidemia    Essential hypertension    Chronic allergic rhinitis    Encounter for screening mammogram for breast cancer    Healthcare maintenance    Encounter for immunization    History of acute gouty arthritis    Urge urinary incontinence    Reactive depression    History of nonmelanoma skin cancer    Onychomycosis    Postural dizziness with presyncope     Advance Care Planning Advance Directive is on file.  ACP discussion was held with the patient during this visit. Patient has an advance directive in EMR which is still valid.       Objective   Vitals:    10/11/24 0923   BP: 132/68   Pulse: 72   Resp: 14   Temp: 98.6 °F (37 °C)   TempSrc: Temporal   SpO2: 98%   Weight: 86.6 kg (191 lb)   Height: 170.2 cm (67\")     Estimated body mass index is 29.91 kg/m² as calculated from the following:    Height as of this encounter: 170.2 cm (67\").    Weight as of this encounter: 86.6 kg (191 lb).     Does the patient have evidence of cognitive impairment? No                                                                                              Health  Risk Assessment    Smoking Status:  Social History     Tobacco Use   Smoking Status Never    Passive exposure: Never   Smokeless Tobacco Never     Alcohol Consumption:  Social History     Substance and Sexual Activity   Alcohol Use No     Fall Risk Screen  STEADI Fall Risk Assessment was completed, and patient is at MODERATE risk for falls.Assessment completed on:10/11/2024    Depression Screening:      10/11/2024     9:24 AM   PHQ-2/PHQ-9 Depression Screening   Little Interest or Pleasure in Doing Things 0-->not at all   Feeling Down, Depressed or Hopeless 0-->not at all   PHQ-9: Brief Depression Severity Measure Score 0     Health Habits and Functional and Cognitive Screening:      10/11/2024     9:24 AM   Functional & Cognitive Status   Do you have difficulty preparing food and eating? No   Do you have " difficulty bathing yourself, getting dressed or grooming yourself? No   Do you have difficulty using the toilet? No   Do you have difficulty moving around from place to place? No   Do you have trouble with steps or getting out of a bed or a chair? No   Current Diet Well Balanced Diet   Dental Exam Not up to date   Eye Exam Not up to date   Exercise (times per week) 7 times per week   Current Exercises Include Walking;House Cleaning   Do you need help using the phone?  No   Are you deaf or do you have serious difficulty hearing?  No   Do you need help to go to places out of walking distance? No   Do you need help shopping? No   Do you need help preparing meals?  No   Do you need help with housework?  No   Do you need help with laundry? No   Do you need help taking your medications? No   Do you need help managing money? No   Do you ever drive or ride in a car without wearing a seat belt? No   Have you felt unusual stress, anger or loneliness in the last month? No   Who do you live with? Other   If you need help, do you have trouble finding someone available to you? No   Have you been bothered in the last four weeks by sexual problems? No   Do you have difficulty concentrating, remembering or making decisions? No           Age-appropriate Screening Schedule:  Refer to the list below for future screening recommendations based on patient's age, sex and/or medical conditions. Orders for these recommended tests are listed in the plan section. The patient has been provided with a written plan.    Health Maintenance List  Health Maintenance   Topic Date Due    ZOSTER VACCINE (3 of 3) 02/04/2020    DXA SCAN  09/13/2023    COVID-19 Vaccine (7 - 2023-24 season) 09/01/2024    LIPID PANEL  10/09/2024    BMI FOLLOWUP  03/01/2025    ANNUAL WELLNESS VISIT  10/11/2025    MAMMOGRAM  11/01/2025    TDAP/TD VACCINES (2 - Td or Tdap) 03/25/2029    RSV Vaccine - Adults  Completed    INFLUENZA VACCINE  Completed    Pneumococcal Vaccine 65+   Completed    COLORECTAL CANCER SCREENING  Discontinued                                                                                                                                              CMS Preventative Services Quick Reference  Risk Factors Identified During Encounter  Depression/Dysphoria:  Doing well at present. Encouraged to report if this should change  Immunizations Discussed/Encouraged: Influenza and COVID19    The above risks/problems have been discussed with the patient.  Pertinent information has been shared with the patient in the After Visit Summary.  An After Visit Summary and PPPS were made available to the patient.    Follow Up:   Next Medicare Wellness visit to be scheduled in 1 year.     Additional E&M Note during same encounter follows:  Patient has additional, significant, and separately identifiable condition(s)/problem(s) that require work above and beyond the Medicare Wellness Visit     Chief Complaint  She returns for a scheduled reassessment of multiple medical problems including essential hypertension, hyperlipidemia, previous gouty arthritis, urge urinary incontinence, chronic allergic rhinitis, depression, previous nonmelanoma skin cancer, and a recent dizzy spell    Subjective   DUNIA Mckee is also being seen today for additional medical problem/s.    Dizzy Spell  She experienced an abrupt onset of dizziness while walking from her bathroom to her living room on 10/1/2024.  She had just got up from sitting following a bowel movement.  She sat back down and the dizziness resolved within a few minutes.  She checked her blood pressure and found it quite elevated.  She underwent assessment here and at [facility] and was ultimately discharged home on her preadmission medication.  She denies any further episodes.  She denies any chest pain, palpitations, diaphoresis, or nausea, and there is no history of any shortness of breath, calf pain, swelling the ankles.  She denies  any flulike symptoms prior to her episode, and there is no history of any fever, chills, night sweats, or weight loss.    Essential hypertension  She continues to deny any chest pain, palpitations, dyspnea, orthopnea, paroxysmal nocturnal dyspnea or peripheral edema.  She remains on bisoprolol, losartan, and hydralazine.  Her blood pressure has generally been at goal since her ER assessment    Hyperlipidemia  Her compliance with treatment remains good.  She is following a reasonable diet and exercises some daily. She is on no medication for this.  She has not had a recent lipid profile    Gouty Arthritis  She denies any attacks since last here and has not required any colchicine.  She has a family history of gout.      Urinary Incontinence  She has a long history of urinary frequency, nocturia x 1-2, urgency, and occasional incontinence if she does not get to the bathroom quick enough. She continues to deny any dysuria, hematuria, or incontinence with coughing, sneezing, lifting, pushing or pulling.  There is no history of any fecal incontinence.  She remains on fesoterodine with no apparent side effects     Chronic Allergic Rhinitis   She has a long history of intermittent rhinorrhea, nasal congestion, and postnasal drip. When severe these symptoms have been associated with a dry cough. There is no history of any other upper respiratory tract symptoms, and she continues to deny any chest pain, shortness of breath, hemoptysis, fever, or chills. She remains on nasal fluticasone, cetirizine, and montelukast and feels that all three help some     Depression  She has noted a continued improvement in her depression, nervousness, sleep, and daytime energy levels. There has been no change in her appetite and she continues to deny any suicidal ideation.  She is on no medication for this    Previous Nonmelanoma Skin Cancer  She denies any change in the lesion over her right jawline, or any new lesions    Review of Systems  "  Constitutional:  Positive for fatigue. Negative for appetite change, chills, diaphoresis and fever.   HENT:  Positive for rhinorrhea and sneezing. Negative for congestion, ear pain, postnasal drip, sinus pressure, sore throat and tinnitus.    Eyes:  Negative for visual disturbance.   Respiratory:  Negative for cough, shortness of breath and wheezing.    Cardiovascular:  Negative for chest pain, palpitations and leg swelling.   Gastrointestinal:  Negative for abdominal pain, blood in stool, constipation, diarrhea, nausea and vomiting.   Genitourinary:  Positive for frequency and urgency. Negative for dysuria and hematuria.   Musculoskeletal:  Negative for arthralgias, back pain, joint swelling and myalgias.   Skin:  Negative for rash.   Neurological:  Negative for weakness, numbness and confusion.   Psychiatric/Behavioral:  Negative for decreased concentration, dysphoric mood, sleep disturbance and suicidal ideas. The patient is nervous/anxious.       Objective   Vital Signs:  /68   Pulse 72   Temp 98.6 °F (37 °C) (Temporal)   Resp 14   Ht 170.2 cm (67\")   Wt 86.6 kg (191 lb)   SpO2 98%   BMI 29.91 kg/m²     Physical Exam  Constitutional:       General: She is not in acute distress.     Appearance: Normal appearance. She is well-developed. She is not diaphoretic.      Comments: Accompanied by her daughter.  Bright and in good spirits. No apparent distress. No pallor, jaundice, diaphoresis, or cyanosis.   HENT:      Head: Atraumatic.      Right Ear: Tympanic membrane, ear canal and external ear normal.      Left Ear: Tympanic membrane, ear canal and external ear normal.      Mouth/Throat:      Lips: No lesions.      Mouth: Mucous membranes are moist. No oral lesions.      Pharynx: No oropharyngeal exudate or posterior oropharyngeal erythema.   Eyes:      General: Lids are normal.      Extraocular Movements: Extraocular movements intact.      Conjunctiva/sclera: Conjunctivae normal.      Pupils: Pupils " are equal.   Neck:      Thyroid: No thyroid mass or thyromegaly.      Vascular: No carotid bruit or JVD.      Trachea: Trachea normal. No tracheal deviation.   Cardiovascular:      Rate and Rhythm: Normal rate and regular rhythm.      Heart sounds: Normal heart sounds, S1 normal and S2 normal. No murmur heard.     No gallop.   Pulmonary:      Effort: Pulmonary effort is normal.      Breath sounds: Normal breath sounds.   Abdominal:      General: Bowel sounds are normal. There is no distension or abdominal bruit.      Palpations: Abdomen is soft. There is no hepatomegaly, splenomegaly or mass.      Tenderness: There is no abdominal tenderness.      Hernia: No hernia is present.   Musculoskeletal:      Right lower leg: No edema.      Left lower leg: No edema.      Comments: No peripheral joint redness or warmth.   Lymphadenopathy:      Head:      Right side of head: No submental, submandibular, tonsillar, preauricular, posterior auricular or occipital adenopathy.      Left side of head: No submental, submandibular, tonsillar, preauricular, posterior auricular or occipital adenopathy.      Cervical: No cervical adenopathy.      Upper Body:      Right upper body: No supraclavicular adenopathy.      Left upper body: No supraclavicular adenopathy.   Skin:     General: Skin is warm.      Coloration: Skin is not cyanotic, jaundiced or pale.      Findings: Lesion (12 mm slightly irregular well demarcated erythematous patch right medial jawline) present. No rash.      Nails: There is no clubbing.   Neurological:      Mental Status: She is alert and oriented to person, place, and time.      Cranial Nerves: No cranial nerve deficit, dysarthria or facial asymmetry.      Sensory: No sensory deficit.      Motor: No tremor.      Coordination: Coordination normal.      Gait: Gait normal.   Psychiatric:         Attention and Perception: Attention normal.         Mood and Affect: Mood normal.         Speech: Speech normal.          Behavior: Behavior normal.         Thought Content: Thought content normal. Thought content does not include suicidal ideation.             Assessment and Plan   Additional age appropriate preventative wellness advice topics were discussed during today's preventative wellness exam(some topics already addressed during AWV portion of the note above):    Physical Activity: Advised cardiovascular activity 150 minutes per week as tolerated. (example brisk walk for 30 minutes, 5 days a week).     Nutrition: Discussed nutrition plan with patient. Information shared in after visit summary. Goal is for a well balanced diet to enhance overall health.       Chronic allergic rhinitis  Continue current medication  Encouraged to report if any worse or if any new symptoms or concerns.  Mixed hyperlipidemia   Encouraged to continue to work on her diet and exercise plan.  Scheduled for updated labs  Essential hypertension   Hypertension: at goal. Evidence of target organ damage: none.   As above  Continue current medication  Urge urinary incontinence  Continue current medication  Encouraged to report if any worse or if any new symptoms or concerns  Healthcare maintenance  Flu shot administered.  Recommended an updated COVID-19 shot later this fall  We will arrange an updated mammogram and DEXA scan  History of nonmelanoma skin cancer    Reactive depression  Stable.  Supportive therapy.   Encouraged to report if any worse or if any new symptoms or concerns.  Osteopenia, unspecified location  Encouraged to continue to pursue weight bearing activities while exercising joint protection.  As above  History of acute gouty arthritis  Remains in remission  Encouraged report if this should change  Postural dizziness with presyncope  Advised to get up carefully from sitting and especially lying  Encouraged report if any further episodes    Orders Placed This Encounter   Procedures    Mammo Screening Digital Tomosynthesis Bilateral With CAD      Standing Status:   Future     Standing Expiration Date:   10/11/2025     Scheduling Instructions:      Please schedule for after 11/1/2024     Order Specific Question:   Reason for Exam:     Answer:   screening     Order Specific Question:   Release to patient     Answer:   Routine Release [[id number]]    DEXA Bone Density Axial     Standing Status:   Future     Standing Expiration Date:   10/11/2025     Scheduling Instructions:      Please schedule for the same day as her mammogram     Order Specific Question:   Reason for Exam:     Answer:   F/U osteopenia     Order Specific Question:   Release to patient     Answer:   Routine Release [[id number]]     Order Specific Question:   Reason for Exam:     Answer:   F/U osteopenia     Order Specific Question:   Does this patient have a diabetic monitoring/medication delivering device on?     Answer:   No     Order Specific Question:   Is patient taking or have taken long term Glucocorticoid (steroids)?     Answer:   No     Order Specific Question:   Does the patient have rheumatoid arthritis?     Answer:   No     Order Specific Question:   Does the patient have secondary osteoporosis?     Answer:   No    Fluzone High-Dose 65+yrs    Comprehensive Metabolic Panel     Standing Status:   Future     Standing Expiration Date:   10/12/2025     Order Specific Question:   Release to patient     Answer:   Routine Release [[id number]]    Lipid Panel     Standing Status:   Future     Standing Expiration Date:   10/11/2025     Order Specific Question:   Release to patient     Answer:   Routine Release [[id number]]    TSH     Standing Status:   Future     Standing Expiration Date:   10/11/2025     Order Specific Question:   Release to patient     Answer:   Routine Release [[id number]]    Vitamin D,25-Hydroxy     Standing Status:   Future     Standing Expiration Date:   10/11/2025     Order Specific Question:   Release to patient     Answer:   Routine Release [[id number]]    Vitamin  B12     Standing Status:   Future     Standing Expiration Date:   10/11/2025     Order Specific Question:   Release to patient     Answer:   Routine Release [[id number]]    Uric Acid     Standing Status:   Future     Standing Expiration Date:   10/11/2025     Order Specific Question:   Release to patient     Answer:   Routine Release [[id number]]    CBC & Differential     Standing Status:   Future     Standing Expiration Date:   10/11/2025     Order Specific Question:   Manual Differential     Answer:   No     Order Specific Question:   Release to patient     Answer:   Routine Release [[id number]]     New Medications Ordered This Visit   Medications    montelukast (SINGULAIR) 10 MG tablet     Sig: Take 1 tablet by mouth Daily.     Dispense:  90 tablet     Refill:  3     This prescription was filled on 10/9/2023. Any refills authorized will be placed on file.    losartan (COZAAR) 100 MG tablet     Sig: Take 1 tablet by mouth Every Morning.     Dispense:  90 tablet     Refill:  3     This prescription was filled on 6/18/2024. Any refills authorized will be placed on file.    hydrALAZINE (APRESOLINE) 50 MG tablet     Sig: Take 1 tablet by mouth 2 (Two) Times a Day.     Dispense:  180 tablet     Refill:  3     This prescription was filled on 7/9/2024. Any refills authorized will be placed on file.    fluticasone (FLONASE) 50 MCG/ACT nasal spray     Sig: Administer 2 sprays into the nostril(s) as directed by provider 2 (Two) Times a Day. USE 2 SPRAYS IN EACH NOSTRIL ONCE DAILY     Dispense:  48 g     Refill:  3    fesoterodine fumarate (Toviaz) 8 MG tablet sustained-release 24 hour tablet     Sig: Take 1 tablet by mouth Daily.     Dispense:  90 tablet     Refill:  3    bisoprolol (ZEBeta) 10 MG tablet     Sig: Take 1 tablet by mouth every night at bedtime.     Dispense:  90 tablet     Refill:  3     This prescription was filled on 6/18/2024. Any refills authorized will be placed on file.          Follow Up   Return in  about 14 weeks (around 1/17/2025) for Fasting labs at convenience.  Patient was given instructions and counseling regarding her condition or for health maintenance advice. Please see specific information pulled into the AVS if appropriate.

## 2024-10-11 NOTE — ASSESSMENT & PLAN NOTE
Hypertension: at goal. Evidence of target organ damage: none.   As above  Continue current medication

## 2024-10-11 NOTE — ASSESSMENT & PLAN NOTE
Flu shot administered.  Recommended an updated COVID-19 shot later this fall  We will arrange an updated mammogram and DEXA scan

## 2024-10-11 NOTE — ASSESSMENT & PLAN NOTE
Encouraged to continue to pursue weight bearing activities while exercising joint protection.  As above

## 2024-10-11 NOTE — ASSESSMENT & PLAN NOTE
Continue current medication  Encouraged to report if any worse or if any new symptoms or concerns.

## 2024-10-11 NOTE — ASSESSMENT & PLAN NOTE
Stable.  Supportive therapy.   Encouraged to report if any worse or if any new symptoms or concerns.

## 2024-11-26 ENCOUNTER — HOSPITAL ENCOUNTER (OUTPATIENT)
Dept: BONE DENSITY | Facility: HOSPITAL | Age: 84
Discharge: HOME | End: 2024-11-26
Payer: MEDICARE

## 2024-11-26 ENCOUNTER — HOSPITAL ENCOUNTER (OUTPATIENT)
Dept: MAMMOGRAPHY | Facility: HOSPITAL | Age: 84
Discharge: HOME | End: 2024-11-26
Payer: MEDICARE

## 2024-11-26 DIAGNOSIS — Z12.31 ENCOUNTER FOR SCREENING MAMMOGRAM FOR BREAST CANCER: ICD-10-CM

## 2024-11-26 DIAGNOSIS — Z00.00 HEALTHCARE MAINTENANCE: ICD-10-CM

## 2024-11-26 DIAGNOSIS — M85.80 OSTEOPENIA, UNSPECIFIED LOCATION: ICD-10-CM

## 2024-11-26 DIAGNOSIS — M81.0 AGE-RELATED OSTEOPOROSIS WITHOUT CURRENT PATHOLOGICAL FRACTURE: ICD-10-CM

## 2024-11-26 PROCEDURE — 77067 SCR MAMMO BI INCL CAD: CPT

## 2024-11-26 PROCEDURE — 77080 DXA BONE DENSITY AXIAL: CPT

## 2024-11-26 PROCEDURE — 77063 BREAST TOMOSYNTHESIS BI: CPT

## 2025-01-02 DIAGNOSIS — J30.9 CHRONIC ALLERGIC RHINITIS: ICD-10-CM

## 2025-01-02 RX ORDER — MONTELUKAST SODIUM 10 MG/1
10 TABLET ORAL DAILY
Qty: 90 TABLET | Refills: 3 | Status: SHIPPED | OUTPATIENT
Start: 2025-01-02

## 2025-01-17 ENCOUNTER — LAB (OUTPATIENT)
Dept: FAMILY MEDICINE CLINIC | Facility: CLINIC | Age: 85
End: 2025-01-17
Payer: MEDICARE

## 2025-01-17 DIAGNOSIS — M85.80 OSTEOPENIA, UNSPECIFIED LOCATION: ICD-10-CM

## 2025-01-17 DIAGNOSIS — Z00.00 HEALTHCARE MAINTENANCE: ICD-10-CM

## 2025-01-17 DIAGNOSIS — F32.9 REACTIVE DEPRESSION: ICD-10-CM

## 2025-01-17 DIAGNOSIS — I10 ESSENTIAL HYPERTENSION: ICD-10-CM

## 2025-01-17 DIAGNOSIS — E78.2 MIXED HYPERLIPIDEMIA: ICD-10-CM

## 2025-01-17 DIAGNOSIS — Z87.39 HISTORY OF ACUTE GOUTY ARTHRITIS: ICD-10-CM

## 2025-01-17 LAB
25(OH)D3 SERPL-MCNC: 54.2 NG/ML (ref 30–100)
ALBUMIN SERPL-MCNC: 4.5 G/DL (ref 3.5–5.2)
ALBUMIN/GLOB SERPL: 1.6 G/DL
ALP SERPL-CCNC: 105 U/L (ref 39–117)
ALT SERPL W P-5'-P-CCNC: 19 U/L (ref 1–33)
ANION GAP SERPL CALCULATED.3IONS-SCNC: 11.3 MMOL/L (ref 5–15)
AST SERPL-CCNC: 25 U/L (ref 1–32)
BASOPHILS # BLD AUTO: 0.06 10*3/MM3 (ref 0–0.2)
BASOPHILS NFR BLD AUTO: 0.7 % (ref 0–1.5)
BILIRUB SERPL-MCNC: 0.3 MG/DL (ref 0–1.2)
BUN SERPL-MCNC: 12 MG/DL (ref 8–23)
BUN/CREAT SERPL: 17.6 (ref 7–25)
CALCIUM SPEC-SCNC: 9.5 MG/DL (ref 8.6–10.5)
CHLORIDE SERPL-SCNC: 102 MMOL/L (ref 98–107)
CHOLEST SERPL-MCNC: 176 MG/DL (ref 0–200)
CO2 SERPL-SCNC: 26.7 MMOL/L (ref 22–29)
CREAT SERPL-MCNC: 0.68 MG/DL (ref 0.57–1)
DEPRECATED RDW RBC AUTO: 41.9 FL (ref 37–54)
EGFRCR SERPLBLD CKD-EPI 2021: 86.5 ML/MIN/1.73
EOSINOPHIL # BLD AUTO: 0.37 10*3/MM3 (ref 0–0.4)
EOSINOPHIL NFR BLD AUTO: 4.2 % (ref 0.3–6.2)
ERYTHROCYTE [DISTWIDTH] IN BLOOD BY AUTOMATED COUNT: 13 % (ref 12.3–15.4)
GLOBULIN UR ELPH-MCNC: 2.9 GM/DL
GLUCOSE SERPL-MCNC: 97 MG/DL (ref 65–99)
HCT VFR BLD AUTO: 40.5 % (ref 34–46.6)
HDLC SERPL-MCNC: 66 MG/DL (ref 40–60)
HGB BLD-MCNC: 13.5 G/DL (ref 12–15.9)
IMM GRANULOCYTES # BLD AUTO: 0.04 10*3/MM3 (ref 0–0.05)
IMM GRANULOCYTES NFR BLD AUTO: 0.5 % (ref 0–0.5)
LDLC SERPL CALC-MCNC: 90 MG/DL (ref 0–100)
LDLC/HDLC SERPL: 1.33 {RATIO}
LYMPHOCYTES # BLD AUTO: 1.84 10*3/MM3 (ref 0.7–3.1)
LYMPHOCYTES NFR BLD AUTO: 20.9 % (ref 19.6–45.3)
MCH RBC QN AUTO: 29.4 PG (ref 26.6–33)
MCHC RBC AUTO-ENTMCNC: 33.3 G/DL (ref 31.5–35.7)
MCV RBC AUTO: 88.2 FL (ref 79–97)
MONOCYTES # BLD AUTO: 0.49 10*3/MM3 (ref 0.1–0.9)
MONOCYTES NFR BLD AUTO: 5.6 % (ref 5–12)
NEUTROPHILS NFR BLD AUTO: 6 10*3/MM3 (ref 1.7–7)
NEUTROPHILS NFR BLD AUTO: 68.1 % (ref 42.7–76)
NRBC BLD AUTO-RTO: 0 /100 WBC (ref 0–0.2)
PLATELET # BLD AUTO: 342 10*3/MM3 (ref 140–450)
PMV BLD AUTO: 8.5 FL (ref 6–12)
POTASSIUM SERPL-SCNC: 3.8 MMOL/L (ref 3.5–5.2)
PROT SERPL-MCNC: 7.4 G/DL (ref 6–8.5)
RBC # BLD AUTO: 4.59 10*6/MM3 (ref 3.77–5.28)
SODIUM SERPL-SCNC: 140 MMOL/L (ref 136–145)
TRIGL SERPL-MCNC: 112 MG/DL (ref 0–150)
TSH SERPL DL<=0.05 MIU/L-ACNC: 1.15 UIU/ML (ref 0.27–4.2)
URATE SERPL-MCNC: 5.7 MG/DL (ref 2.4–5.7)
VIT B12 BLD-MCNC: 456 PG/ML (ref 211–946)
VLDLC SERPL-MCNC: 20 MG/DL (ref 5–40)
WBC NRBC COR # BLD AUTO: 8.8 10*3/MM3 (ref 3.4–10.8)

## 2025-01-17 PROCEDURE — 84550 ASSAY OF BLOOD/URIC ACID: CPT | Performed by: GENERAL PRACTICE

## 2025-01-17 PROCEDURE — 36415 COLL VENOUS BLD VENIPUNCTURE: CPT

## 2025-01-17 PROCEDURE — 85025 COMPLETE CBC W/AUTO DIFF WBC: CPT | Performed by: GENERAL PRACTICE

## 2025-01-17 PROCEDURE — 80061 LIPID PANEL: CPT | Performed by: GENERAL PRACTICE

## 2025-01-17 PROCEDURE — 80053 COMPREHEN METABOLIC PANEL: CPT | Performed by: GENERAL PRACTICE

## 2025-01-17 PROCEDURE — 82607 VITAMIN B-12: CPT | Performed by: GENERAL PRACTICE

## 2025-01-17 PROCEDURE — 84443 ASSAY THYROID STIM HORMONE: CPT | Performed by: GENERAL PRACTICE

## 2025-01-17 PROCEDURE — 82306 VITAMIN D 25 HYDROXY: CPT | Performed by: GENERAL PRACTICE

## 2025-01-30 ENCOUNTER — OFFICE VISIT (OUTPATIENT)
Dept: FAMILY MEDICINE CLINIC | Facility: CLINIC | Age: 85
End: 2025-01-30
Payer: MEDICARE

## 2025-01-30 VITALS
RESPIRATION RATE: 14 BRPM | DIASTOLIC BLOOD PRESSURE: 68 MMHG | BODY MASS INDEX: 30.13 KG/M2 | HEIGHT: 67 IN | OXYGEN SATURATION: 96 % | SYSTOLIC BLOOD PRESSURE: 138 MMHG | TEMPERATURE: 98.6 F | WEIGHT: 192 LBS | HEART RATE: 73 BPM

## 2025-01-30 DIAGNOSIS — N39.41 URGE URINARY INCONTINENCE: ICD-10-CM

## 2025-01-30 DIAGNOSIS — F32.9 REACTIVE DEPRESSION: ICD-10-CM

## 2025-01-30 DIAGNOSIS — Z87.39 HISTORY OF ACUTE GOUTY ARTHRITIS: ICD-10-CM

## 2025-01-30 DIAGNOSIS — I10 ESSENTIAL HYPERTENSION: ICD-10-CM

## 2025-01-30 DIAGNOSIS — M85.80 OSTEOPENIA, UNSPECIFIED LOCATION: ICD-10-CM

## 2025-01-30 DIAGNOSIS — J30.9 CHRONIC ALLERGIC RHINITIS: Primary | ICD-10-CM

## 2025-01-30 DIAGNOSIS — E78.2 MIXED HYPERLIPIDEMIA: ICD-10-CM

## 2025-01-30 DIAGNOSIS — Z00.00 HEALTHCARE MAINTENANCE: ICD-10-CM

## 2025-01-30 DIAGNOSIS — Z85.828 HISTORY OF NONMELANOMA SKIN CANCER: ICD-10-CM

## 2025-01-30 PROBLEM — B35.1 ONYCHOMYCOSIS: Status: RESOLVED | Noted: 2023-10-09 | Resolved: 2025-01-30

## 2025-01-30 PROBLEM — R42 POSTURAL DIZZINESS WITH PRESYNCOPE: Status: RESOLVED | Noted: 2024-10-11 | Resolved: 2025-01-30

## 2025-01-30 PROBLEM — R55 POSTURAL DIZZINESS WITH PRESYNCOPE: Status: RESOLVED | Noted: 2024-10-11 | Resolved: 2025-01-30

## 2025-01-30 PROCEDURE — 99214 OFFICE O/P EST MOD 30 MIN: CPT | Performed by: GENERAL PRACTICE

## 2025-01-30 PROCEDURE — 3075F SYST BP GE 130 - 139MM HG: CPT | Performed by: GENERAL PRACTICE

## 2025-01-30 PROCEDURE — 3078F DIAST BP <80 MM HG: CPT | Performed by: GENERAL PRACTICE

## 2025-01-30 PROCEDURE — G2211 COMPLEX E/M VISIT ADD ON: HCPCS | Performed by: GENERAL PRACTICE

## 2025-01-30 PROCEDURE — 1125F AMNT PAIN NOTED PAIN PRSNT: CPT | Performed by: GENERAL PRACTICE

## 2025-01-30 NOTE — PROGRESS NOTES
Subjective   Rylee Hull is a 84 y.o. female.     Chief Complaint  She returns for a scheduled reassessment of multiple medical problems including essential hypertension, hyperlipidemia, previous gouty arthritis, urinary incontinence, chronic allergic rhinitis, depression, and previous nonmelanoma skin cancer    History of Present Illness     Essential hypertension  She denies any further dizziness.  She continues to deny any chest pain, palpitations, dyspnea, orthopnea, paroxysmal nocturnal dyspnea or peripheral edema.  She remains on bisoprolol, losartan, and hydralazine.    Lab Results   Component Value Date    GLUCOSE 97 01/17/2025    BUN 12 01/17/2025    CREATININE 0.68 01/17/2025     01/17/2025    K 3.8 01/17/2025     01/17/2025    CALCIUM 9.5 01/17/2025    PROTEINTOT 7.4 01/17/2025    ALBUMIN 4.5 01/17/2025    ALT 19 01/17/2025    AST 25 01/17/2025    ALKPHOS 105 01/17/2025    BILITOT 0.3 01/17/2025    GLOB 2.9 01/17/2025    AGRATIO 1.6 01/17/2025    BCR 17.6 01/17/2025    ANIONGAP 11.3 01/17/2025    EGFR 86.5 01/17/2025     Hyperlipidemia  Her compliance with treatment remains quite good.  She is following a reasonable diet and exercises some daily. She is on no medication for this.   Lab Results   Component Value Date    CHOL 176 01/17/2025    CHLPL 204 (H) 09/01/2015    TRIG 112 01/17/2025    HDL 66 (H) 01/17/2025    LDL 90 01/17/2025     Gouty Arthritis  She denies any recent attacks and has not required any colchicine.  She has a family history of gout.    Lab Results   Component Value Date    URICACID 5.7 01/17/2025     Urinary Incontinence  She has a long history of urinary frequency, nocturia x 1-2, urgency, and occasional incontinence if she does not get to the bathroom quick enough. She continues to deny any dysuria, hematuria, or incontinence with coughing, sneezing, lifting, pushing or pulling.  There is no history of any fecal incontinence.  She remains on fesoterodine with no  apparent side effects     Chronic Allergic Rhinitis   She has a long history of intermittent rhinorrhea, nasal congestion, and postnasal drip. When severe these symptoms have been associated with a dry cough. There is no history of any other upper respiratory tract symptoms, and she continues to deny any chest pain, shortness of breath, hemoptysis, fever, or chills. She remains on nasal fluticasone, cetirizine, and montelukast and feels that all three help some     Depression  She has noted a continued improvement in her depression, nervousness, sleep, and daytime energy levels. There has been no change in her appetite and she continues to deny any suicidal ideation.  She is on no medication for this  Lab Results   Component Value Date    TSH 1.150 01/17/2025     Previous Nonmelanoma Skin Cancer  She denies any skin changes    Labs  Most recent vitamin D 54.2  Lab Results   Component Value Date    WBC 8.80 01/17/2025    HGB 13.5 01/17/2025    HCT 40.5 01/17/2025    MCV 88.2 01/17/2025     01/17/2025     The following portions of the patient's history were reviewed and updated as appropriate: allergies, current medications, past medical history, past social history, and problem list.    Review of Systems   Constitutional:  Positive for fatigue. Negative for chills and fever.   HENT:  Positive for rhinorrhea and sneezing. Negative for congestion, ear pain, sore throat and voice change.    Eyes:  Negative for visual disturbance.   Respiratory:  Negative for cough, shortness of breath and wheezing.    Cardiovascular:  Negative for chest pain, palpitations and leg swelling.   Gastrointestinal:  Negative for abdominal pain, blood in stool, constipation, diarrhea, nausea and vomiting.   Genitourinary:  Positive for frequency and urgency. Negative for dysuria and hematuria.   Musculoskeletal:  Negative for arthralgias, back pain, joint swelling and myalgias.   Skin:  Negative for rash.   Neurological:  Negative for  dizziness, weakness and numbness.   Psychiatric/Behavioral:  Negative for sleep disturbance, suicidal ideas and depressed mood. The patient is nervous/anxious.      Objective   Physical Exam  Constitutional:       General: She is not in acute distress.     Appearance: Normal appearance. She is well-developed. She is not diaphoretic.      Comments: Appeared a bit younger than stated age.  Bright and in good spirits. No apparent distress. No pallor, jaundice, diaphoresis, or cyanosis.   HENT:      Head: Atraumatic.      Right Ear: Tympanic membrane, ear canal and external ear normal.      Left Ear: Tympanic membrane, ear canal and external ear normal.      Mouth/Throat:      Lips: No lesions.      Mouth: Mucous membranes are moist. No oral lesions.      Pharynx: No oropharyngeal exudate or posterior oropharyngeal erythema.   Eyes:      General: Lids are normal.      Extraocular Movements: Extraocular movements intact.      Conjunctiva/sclera: Conjunctivae normal.      Pupils: Pupils are equal.   Neck:      Thyroid: No thyroid mass or thyromegaly.      Vascular: No carotid bruit or JVD.      Trachea: Trachea normal. No tracheal deviation.   Cardiovascular:      Rate and Rhythm: Normal rate and regular rhythm.      Heart sounds: Normal heart sounds, S1 normal and S2 normal. No murmur heard.     No gallop.   Pulmonary:      Effort: Pulmonary effort is normal.      Breath sounds: Normal breath sounds.   Abdominal:      General: Bowel sounds are normal. There is no distension.   Musculoskeletal:      Right lower leg: No edema.      Left lower leg: No edema.      Comments: No peripheral joint redness or warmth.   Lymphadenopathy:      Head:      Right side of head: No submental, submandibular, tonsillar, preauricular, posterior auricular or occipital adenopathy.      Left side of head: No submental, submandibular, tonsillar, preauricular, posterior auricular or occipital adenopathy.      Cervical: No cervical adenopathy.       Upper Body:      Right upper body: No supraclavicular adenopathy.      Left upper body: No supraclavicular adenopathy.   Skin:     General: Skin is warm.      Coloration: Skin is not cyanotic, jaundiced or pale.      Findings: No rash.      Nails: There is no clubbing.   Neurological:      Mental Status: She is alert and oriented to person, place, and time.      Cranial Nerves: No cranial nerve deficit, dysarthria or facial asymmetry.      Sensory: No sensory deficit.      Motor: No tremor.      Coordination: Coordination normal.      Gait: Gait normal.   Psychiatric:         Attention and Perception: Attention normal.         Mood and Affect: Mood normal.         Speech: Speech normal.         Behavior: Behavior normal.         Thought Content: Thought content normal. Thought content does not include suicidal ideation.       Assessment & Plan   Problems Addressed this Visit          Allergies and Adverse Reactions    Chronic allergic rhinitis  Continue current medication  Encouraged to report if any worse or if any new symptoms or concerns.       Cardiac and Vasculature    Essential hypertension   Hypertension: at goal. Evidence of target organ damage: none.  Encouraged to continue to work on diet and exercise plan.   Continue current medication    Mixed hyperlipidemia  As above.        Genitourinary and Reproductive     Urge urinary incontinence  Continue current medication  Encouraged to report if any worse or if any new symptoms or concerns.       Health Encounters    Healthcare maintenance  Recommended an updated eye exam and COVID-19       Hematology and Neoplasia    History of nonmelanoma skin cancer  Encouraged report if any skin changes       Mental Health    Reactive depression  Significant situational component.   Supportive therapy.        Musculoskeletal and Injuries    History of acute gouty arthritis  Remains in remission    Osteopenia     Diagnoses         Codes Comments    Chronic allergic  rhinitis    -  Primary ICD-10-CM: J30.9  ICD-9-CM: 477.9     Mixed hyperlipidemia     ICD-10-CM: E78.2  ICD-9-CM: 272.2     Essential hypertension     ICD-10-CM: I10  ICD-9-CM: 401.9     Urge urinary incontinence     ICD-10-CM: N39.41  ICD-9-CM: 788.31     Healthcare maintenance     ICD-10-CM: Z00.00  ICD-9-CM: V70.0     History of nonmelanoma skin cancer     ICD-10-CM: Z85.828  ICD-9-CM: V10.83     Reactive depression     ICD-10-CM: F32.9  ICD-9-CM: 300.4     Osteopenia, unspecified location     ICD-10-CM: M85.80  ICD-9-CM: 733.90     History of acute gouty arthritis     ICD-10-CM: Z87.39  ICD-9-CM: V13.4

## 2025-06-06 ENCOUNTER — OFFICE VISIT (OUTPATIENT)
Dept: FAMILY MEDICINE CLINIC | Facility: CLINIC | Age: 85
End: 2025-06-06
Payer: MEDICARE

## 2025-06-06 VITALS
WEIGHT: 191 LBS | HEIGHT: 67 IN | SYSTOLIC BLOOD PRESSURE: 158 MMHG | DIASTOLIC BLOOD PRESSURE: 80 MMHG | RESPIRATION RATE: 14 BRPM | TEMPERATURE: 97.6 F | BODY MASS INDEX: 29.98 KG/M2 | OXYGEN SATURATION: 93 % | HEART RATE: 71 BPM

## 2025-06-06 DIAGNOSIS — Z87.39 HISTORY OF ACUTE GOUTY ARTHRITIS: ICD-10-CM

## 2025-06-06 DIAGNOSIS — M25.511 ACUTE PAIN OF RIGHT SHOULDER: ICD-10-CM

## 2025-06-06 DIAGNOSIS — I10 ESSENTIAL HYPERTENSION: ICD-10-CM

## 2025-06-06 DIAGNOSIS — M54.6 ACUTE BILATERAL THORACIC BACK PAIN: ICD-10-CM

## 2025-06-06 DIAGNOSIS — Z00.00 HEALTHCARE MAINTENANCE: ICD-10-CM

## 2025-06-06 DIAGNOSIS — J30.9 CHRONIC ALLERGIC RHINITIS: Primary | ICD-10-CM

## 2025-06-06 DIAGNOSIS — N39.41 URGE URINARY INCONTINENCE: ICD-10-CM

## 2025-06-06 DIAGNOSIS — Z85.828 HISTORY OF NONMELANOMA SKIN CANCER: ICD-10-CM

## 2025-06-06 DIAGNOSIS — F32.9 REACTIVE DEPRESSION: ICD-10-CM

## 2025-06-06 DIAGNOSIS — E78.2 MIXED HYPERLIPIDEMIA: ICD-10-CM

## 2025-06-06 DIAGNOSIS — M85.80 OSTEOPENIA, UNSPECIFIED LOCATION: ICD-10-CM

## 2025-06-06 NOTE — PROGRESS NOTES
Subjective   Rylee Hull is a 85 y.o. female.     Chief Complaint  She returns for a scheduled reassessment of multiple medical problems including previous gouty arthritis, essential hypertension, hyperlipidemia, urinary incontinence, chronic allergic rhinitis, depression, and recent back and right shoulder pain    History of Present Illness     Back and Right Shoulder Pain  She gives an approximate 1 week history of upper back and right shoulder pain.  She denies any strain or trauma, but has been moving furniture while 2 rooms in her house have new floors placed.  She describes her back pain as a dull bilateral ache worse with prolonged posture or movement.  She describes her shoulder pain as a sharp or lateral ache with activities above shoulder height.  She denies any radiation, there has been no change in her strength, sensation, or bowel/bladder control.  She denies any fever, chills, or night sweats.  She is right-hand dominant    Gouty Arthritis  She denies any recent attacks and has not taken any colchicine.  She has a family history of gout.      Essential hypertension  She continues to deny any chest pain, palpitations, dyspnea, orthopnea, paroxysmal nocturnal dyspnea or peripheral edema.  She remains on bisoprolol, losartan, and hydralazine.      Hyperlipidemia  Her compliance with treatment remains quite good.  She is following a reasonable diet and exercises some daily. She is on no medication for this.     Urinary Incontinence  She has a long history of urinary frequency, nocturia x 1-2, urgency, and occasional incontinence if she does not get to the bathroom quick enough. She continues to deny any dysuria, hematuria, or incontinence with coughing, sneezing, lifting, pushing or pulling.  There is no history of any fecal incontinence.  She remains on fesoterodine with no apparent side effects     Chronic Allergic Rhinitis   She has a long history of intermittent rhinorrhea, nasal congestion, and  postnasal drip. When severe these symptoms have been associated with a dry cough. There is no history of any other upper respiratory tract symptoms, and she continues to deny any chest pain, shortness of breath, hemoptysis, fever, or chills. She remains on nasal fluticasone, cetirizine, and montelukast and feels that all three help some     Depression  She has noted a continued improvement in her depression, nervousness, sleep, and daytime energy levels. There has been no change in her appetite and she continues to deny any suicidal ideation.  She is on no medication for this    Previous Nonmelanoma Skin Cancer  She continues did not have any skin changes    The following portions of the patient's history were reviewed and updated as appropriate: allergies, current medications, past medical history, past social history, and problem list.    Review of Systems   Constitutional:  Positive for fatigue. Negative for chills and fever.   HENT:  Positive for rhinorrhea and sneezing. Negative for congestion, ear pain, sore throat and voice change.    Eyes:  Negative for visual disturbance.   Respiratory:  Negative for cough, shortness of breath and wheezing.    Cardiovascular:  Negative for chest pain, palpitations and leg swelling.   Gastrointestinal:  Negative for abdominal pain, blood in stool, constipation, diarrhea, nausea and vomiting.   Genitourinary:  Positive for frequency and urgency. Negative for dysuria and hematuria.   Musculoskeletal:  Positive for arthralgias and back pain. Negative for joint swelling and myalgias.   Skin:  Negative for rash.   Neurological:  Negative for dizziness, weakness and numbness.   Psychiatric/Behavioral:  Negative for sleep disturbance, suicidal ideas and depressed mood. The patient is nervous/anxious.      Objective   Physical Exam  Constitutional:       General: She is not in acute distress.     Appearance: Normal appearance. She is well-developed. She is not diaphoretic.       Comments: Appeared a bit younger than stated age.  Bright and in good spirits. No apparent distress. No pallor, jaundice, diaphoresis, or cyanosis.   HENT:      Head: Atraumatic.      Right Ear: Tympanic membrane, ear canal and external ear normal.      Left Ear: Tympanic membrane, ear canal and external ear normal.      Mouth/Throat:      Lips: No lesions.      Mouth: Mucous membranes are moist. No oral lesions.      Pharynx: No oropharyngeal exudate or posterior oropharyngeal erythema.   Eyes:      General: Lids are normal.      Extraocular Movements: Extraocular movements intact.      Conjunctiva/sclera: Conjunctivae normal.      Pupils: Pupils are equal.   Neck:      Thyroid: No thyroid mass or thyromegaly.      Vascular: No carotid bruit or JVD.      Trachea: Trachea normal. No tracheal deviation.   Cardiovascular:      Rate and Rhythm: Normal rate and regular rhythm.      Heart sounds: Normal heart sounds, S1 normal and S2 normal. No murmur heard.     No gallop.   Pulmonary:      Effort: Pulmonary effort is normal.      Breath sounds: Normal breath sounds.   Abdominal:      General: Bowel sounds are normal. There is no distension.   Musculoskeletal:      Right shoulder: No swelling, deformity, tenderness or crepitus. Decreased range of motion (abduction and flexion limited to 90 degrees -otherwise normal ROM).      Cervical back: No swelling, deformity or tenderness. Normal range of motion.      Thoracic back: Tenderness (bilateral paraspinal muscle tenderness) present. No swelling or deformity. Normal range of motion.      Right lower leg: No edema.      Left lower leg: No edema.      Comments: No peripheral joint redness or warmth.   Lymphadenopathy:      Head:      Right side of head: No submental, submandibular, tonsillar, preauricular, posterior auricular or occipital adenopathy.      Left side of head: No submental, submandibular, tonsillar, preauricular, posterior auricular or occipital adenopathy.       Cervical: No cervical adenopathy.      Upper Body:      Right upper body: No supraclavicular adenopathy.      Left upper body: No supraclavicular adenopathy.   Skin:     General: Skin is warm.      Coloration: Skin is not cyanotic, jaundiced or pale.      Findings: No rash.      Nails: There is no clubbing.   Neurological:      Mental Status: She is alert and oriented to person, place, and time.      Cranial Nerves: No cranial nerve deficit, dysarthria or facial asymmetry.      Sensory: No sensory deficit.      Motor: No tremor.      Coordination: Coordination normal.      Gait: Gait normal.   Psychiatric:         Attention and Perception: Attention normal.         Mood and Affect: Mood normal.         Speech: Speech normal.         Behavior: Behavior normal.         Thought Content: Thought content normal. Thought content does not include suicidal ideation.       Assessment & Plan   Problems Addressed this Visit          Allergies and Adverse Reactions    Chronic allergic rhinitis   Continue current medication  Encouraged to report if any worse or if any new symptoms or concerns.       Cardiac and Vasculature    Essential hypertension   Hypertension: systolic blood pressure remains elevated here but is consistently at goal at home. Evidence of target organ damage: none.  Encouraged to continue to work on diet and exercise plan.   Continue current medication    Mixed hyperlipidemia  As above.  Will continue to monitor       Genitourinary and Reproductive     Urge urinary incontinence  Continue current medication  Encouraged to report if any worse or if any new symptoms or concerns.       Health Encounters    Healthcare maintenance  Reminded to get a flu shot when available       Hematology and Neoplasia    History of nonmelanoma skin cancer  Encouraged to report if any skin changes       Mental Health    Reactive depression  Continues to do well  Encouraged to report if this should change.       Musculoskeletal and  Injuries    Acute bilateral thoracic back pain  Probable muscle strain  Advised regarding rest, gentle exercise, ice and heat.  Encouraged to report if any worse, any new symptoms, or if not resolving over the next few weeks    Acute pain of right shoulder  Probable rotator cuff tendinitis  As above.    History of acute gouty arthritis  Remains in remission  Encouraged to report if this should change.    Osteopenia     Diagnoses         Codes Comments      Chronic allergic rhinitis    -  Primary ICD-10-CM: J30.9  ICD-9-CM: 477.9       Mixed hyperlipidemia     ICD-10-CM: E78.2  ICD-9-CM: 272.2       Essential hypertension     ICD-10-CM: I10  ICD-9-CM: 401.9       Urge urinary incontinence     ICD-10-CM: N39.41  ICD-9-CM: 788.31       Healthcare maintenance     ICD-10-CM: Z00.00  ICD-9-CM: V70.0       History of nonmelanoma skin cancer     ICD-10-CM: Z85.828  ICD-9-CM: V10.83       Reactive depression     ICD-10-CM: F32.9  ICD-9-CM: 300.4       Osteopenia, unspecified location     ICD-10-CM: M85.80  ICD-9-CM: 733.90       History of acute gouty arthritis     ICD-10-CM: Z87.39  ICD-9-CM: V13.4       Acute pain of right shoulder     ICD-10-CM: M25.511  ICD-9-CM: 719.41       Acute bilateral thoracic back pain     ICD-10-CM: M54.6  ICD-9-CM: 724.1

## 2025-06-20 DIAGNOSIS — Z87.39 HISTORY OF ACUTE GOUTY ARTHRITIS: ICD-10-CM

## 2025-06-20 DIAGNOSIS — I10 ESSENTIAL HYPERTENSION: ICD-10-CM

## 2025-06-20 RX ORDER — LOSARTAN POTASSIUM 100 MG/1
100 TABLET ORAL EVERY MORNING
Qty: 90 TABLET | Refills: 3 | Status: SHIPPED | OUTPATIENT
Start: 2025-06-20

## 2025-06-20 RX ORDER — BISOPROLOL FUMARATE 10 MG/1
10 TABLET, FILM COATED ORAL
Qty: 90 TABLET | Refills: 3 | Status: SHIPPED | OUTPATIENT
Start: 2025-06-20

## 2025-06-26 ENCOUNTER — OFFICE VISIT (OUTPATIENT)
Dept: FAMILY MEDICINE CLINIC | Facility: CLINIC | Age: 85
End: 2025-06-26
Payer: MEDICARE

## 2025-06-26 VITALS
WEIGHT: 191 LBS | HEART RATE: 76 BPM | BODY MASS INDEX: 29.98 KG/M2 | OXYGEN SATURATION: 96 % | TEMPERATURE: 98.3 F | HEIGHT: 67 IN

## 2025-06-26 DIAGNOSIS — Z48.02 ENCOUNTER FOR REMOVAL OF SUTURES: Primary | ICD-10-CM

## 2025-06-26 NOTE — PROGRESS NOTES
"    Subjective        Chief Complaint  Suture / Staple Removal    Subjective      Rylee Hull is a 85 y.o. female who presents today to [facility] for Suture / Staple Removal.     Suture / Staple Removal  She was seen in the ER of [facility] on 6/17/25 due to fall and laceration of the left hand. 15 sutures were placed. She is here today for suture removal. She is currently taking oral antibiotics and using topical mupirocin on the wound. TDAP was updated in the ER.       Current Outpatient Medications:     bisoprolol (ZEBeta) 10 MG tablet, TAKE ONE TABLET BY MOUTH AT BEDTIME, Disp: 90 tablet, Rfl: 3    fesoterodine fumarate (Toviaz) 8 MG tablet sustained-release 24 hour tablet, Take 1 tablet by mouth Daily., Disp: 90 tablet, Rfl: 3    fluticasone (FLONASE) 50 MCG/ACT nasal spray, Administer 2 sprays into the nostril(s) as directed by provider 2 (Two) Times a Day. USE 2 SPRAYS IN EACH NOSTRIL ONCE DAILY, Disp: 48 g, Rfl: 3    hydrALAZINE (APRESOLINE) 50 MG tablet, Take 1 tablet by mouth 2 (Two) Times a Day., Disp: 180 tablet, Rfl: 3    losartan (COZAAR) 100 MG tablet, TAKE ONE TABLET BY MOUTH EVERY MORNING, Disp: 90 tablet, Rfl: 3    montelukast (SINGULAIR) 10 MG tablet, TAKE ONE TABLET BY MOUTH ONCE DAILY, Disp: 90 tablet, Rfl: 3    vitamin D3 125 MCG (5000 UT) capsule capsule, Take 1 capsule by mouth Daily., Disp: , Rfl:       Allergies   Allergen Reactions    Barbiturates Rash       Objective     Objective   Vital Signs:  Pulse 76   Temp 98.3 °F (36.8 °C) (Temporal)   Ht 170.2 cm (67\")   Wt 86.6 kg (191 lb)   SpO2 96%   BMI 29.91 kg/m²   Estimated body mass index is 29.91 kg/m² as calculated from the following:    Height as of this encounter: 170.2 cm (67\").    Weight as of this encounter: 86.6 kg (191 lb).    BMI is >= 25 and <30. (Overweight) The following options were offered after discussion;: weight loss educational material (shared in after visit " summary)    Past Medical History:   Diagnosis Date    Actinic keratosis     Allergic rhinitis     Arthritis     Hyperlipidemia     Hypertension     Osteopenia     Vitamin deficiency      Past Surgical History:   Procedure Laterality Date    ABDOMINAL SURGERY      APPENDECTOMY      BREAST BIOPSY Left     over 25 years    COLONOSCOPY      several    HYSTERECTOMY      SKIN LESION EXCISION Left 7/14/2023    Procedure: SKIN LESION EXCISION;  Surgeon: Manolo Bergman MD;  Location: [facility];  Service: General;  Laterality: Left;     Social History     Socioeconomic History    Marital status:     Number of children: 3    Years of education: 12   Tobacco Use    Smoking status: Never     Passive exposure: Never    Smokeless tobacco: Never   Vaping Use    Vaping status: Never Used   Substance and Sexual Activity    Alcohol use: No    Drug use: Never    Sexual activity: Defer     Birth control/protection: Hysterectomy        Physical Exam  Vitals and nursing note reviewed.   Constitutional:       General: She is not in acute distress.     Appearance: She is well-developed. She is not diaphoretic.   HENT:      Head: Normocephalic and atraumatic.   Eyes:      General: No scleral icterus.        Right eye: No discharge.         Left eye: No discharge.      Conjunctiva/sclera: Conjunctivae normal.   Pulmonary:      Effort: Pulmonary effort is normal. No respiratory distress.   Musculoskeletal:         General: Normal range of motion.        Arms:       Cervical back: Normal range of motion and neck supple.      Comments: 15 simple interrupted sutures are in place. The wound is clean and without surrounding erythema. Mild scabbing present.    Skin:     General: Skin is warm and dry.      Coloration: Skin is not pale.      Findings: No erythema or rash.   Neurological:      Mental Status: She is alert and oriented to person, place, and time.   Psychiatric:         Behavior: Behavior normal.          Result Review  ":  The following data was reviewed by: BRANDAN Lerner on 06/26/2025:  TSH   Date Value Ref Range Status   01/17/2025 1.150 0.270 - 4.200 uIU/mL Final     HDL Cholesterol   Date Value Ref Range Status   01/17/2025 66 (H) 40 - 60 mg/dL Final     LDL Cholesterol    Date Value Ref Range Status   01/17/2025 90 0 - 100 mg/dL Final     Triglycerides   Date Value Ref Range Status   01/17/2025 112 0 - 150 mg/dL Final     Total Cholesterol   Date Value Ref Range Status   09/01/2015 204 (H) 0 - 200 mg/dL Final     Comment:     DF by IF @ 09/01/2015 12:31  Cholesterol Reference Range:      Desirable                 < 200mg/dl      Borderline High        200-239mg/dl      High Risk                 > 239mg/dl         Suture Removal    Date/Time: 6/26/2025 10:01 AM    Performed by: Hazel Orona PA  Authorized by: Hazel Orona PA  Consent: Verbal consent obtained  Risks and benefits: risks, benefits and alternatives were discussed  Consent given by: patient  Patient understanding: patient states understanding of the procedure being performed  Patient identity confirmed: verbally with patient  Time out: Immediately prior to procedure a \"time out\" was called to verify the correct patient, procedure, equipment, support staff and site/side marked as required.  Body area: upper extremity  Location details: left hand  Wound Appearance: clean  Sutures Removed: 15  Post-removal: Steri-Strips applied and dressing applied  Patient tolerance: patient tolerated the procedure well with no immediate complications           Assessment / Plan         Assessment   Diagnoses and all orders for this visit:    1. Encounter for removal of sutures (Primary)  -Left hand laceration sutures were removed per procedure note above and steri-strips placed.   -Patient encouraged to keep covered for a few more days with daily dressing changes. Finish out oral antibiotics. Let steri-strips fall off on their own.   -Monitor for any " new or worsening symptoms and return to clinic should concerns arise.  -     Suture Removal       No orders of the defined types were placed in this encounter.    Follow Up   Return if symptoms worsen or fail to improve.    Patient was given instructions and counseling regarding her condition or for health maintenance advice. Please see specific information pulled into the AVS if appropriate.       This document has been electronically signed by BRANDAN Lerner   June 26, 2025 10:01 EDT    Dictated Utilizing Dragon Dictation: Part of this note may be an electronic transcription/translation of spoken language to printed text using the Dragon Dictation System.

## 2025-07-07 ENCOUNTER — TELEPHONE (OUTPATIENT)
Dept: FAMILY MEDICINE CLINIC | Facility: CLINIC | Age: 85
End: 2025-07-07
Payer: MEDICARE